# Patient Record
Sex: FEMALE | Race: WHITE | NOT HISPANIC OR LATINO | Employment: FULL TIME | URBAN - NONMETROPOLITAN AREA
[De-identification: names, ages, dates, MRNs, and addresses within clinical notes are randomized per-mention and may not be internally consistent; named-entity substitution may affect disease eponyms.]

---

## 2017-03-14 ENCOUNTER — TRANSCRIBE ORDERS (OUTPATIENT)
Dept: ADMINISTRATIVE | Facility: HOSPITAL | Age: 57
End: 2017-03-14

## 2017-03-14 DIAGNOSIS — Z12.31 VISIT FOR SCREENING MAMMOGRAM: Primary | ICD-10-CM

## 2017-04-03 ENCOUNTER — HOSPITAL ENCOUNTER (OUTPATIENT)
Dept: MAMMOGRAPHY | Facility: HOSPITAL | Age: 57
Discharge: HOME | End: 2017-04-03
Attending: FAMILY MEDICINE | Admitting: FAMILY MEDICINE

## 2017-04-03 DIAGNOSIS — Z12.31 VISIT FOR SCREENING MAMMOGRAM: ICD-10-CM

## 2017-04-03 PROCEDURE — G0202 SCR MAMMO BI INCL CAD: HCPCS

## 2017-04-03 PROCEDURE — 77063 BREAST TOMOSYNTHESIS BI: CPT

## 2017-04-03 PROCEDURE — 77067 SCR MAMMO BI INCL CAD: CPT | Performed by: RADIOLOGY

## 2017-04-03 PROCEDURE — 77063 BREAST TOMOSYNTHESIS BI: CPT | Performed by: RADIOLOGY

## 2017-04-13 ENCOUNTER — HOSPITAL ENCOUNTER (OUTPATIENT)
Dept: ULTRASOUND IMAGING | Facility: HOSPITAL | Age: 57
Discharge: HOME | End: 2017-04-13
Attending: RADIOLOGY | Admitting: RADIOLOGY

## 2017-04-13 DIAGNOSIS — R92.8 ABNORMAL MAMMOGRAM: ICD-10-CM

## 2017-04-13 PROCEDURE — 76642 ULTRASOUND BREAST LIMITED: CPT | Performed by: RADIOLOGY

## 2017-04-13 PROCEDURE — 76642 ULTRASOUND BREAST LIMITED: CPT

## 2017-11-15 ENCOUNTER — OFFICE VISIT (OUTPATIENT)
Dept: GYNECOLOGIC ONCOLOGY | Facility: CLINIC | Age: 57
End: 2017-11-15

## 2017-11-15 VITALS
RESPIRATION RATE: 12 BRPM | BODY MASS INDEX: 30.66 KG/M2 | HEIGHT: 65 IN | OXYGEN SATURATION: 95 % | DIASTOLIC BLOOD PRESSURE: 87 MMHG | SYSTOLIC BLOOD PRESSURE: 188 MMHG | HEART RATE: 60 BPM | TEMPERATURE: 97.4 F | WEIGHT: 184 LBS

## 2017-11-15 DIAGNOSIS — N81.6 RECTOCELE: ICD-10-CM

## 2017-11-15 DIAGNOSIS — Z01.419 WELL FEMALE EXAM WITH ROUTINE GYNECOLOGICAL EXAM: Primary | ICD-10-CM

## 2017-11-15 PROCEDURE — 99396 PREV VISIT EST AGE 40-64: CPT | Performed by: NURSE PRACTITIONER

## 2017-11-15 RX ORDER — ESTRADIOL 0.5 MG/1
0.5 TABLET ORAL DAILY
Qty: 30 TABLET | Refills: 11 | Status: SHIPPED | OUTPATIENT
Start: 2017-11-15 | End: 2018-01-11 | Stop reason: SDUPTHER

## 2017-11-15 RX ORDER — POLYETHYLENE GLYCOL 3350 17 G/17G
17 POWDER, FOR SOLUTION ORAL AS NEEDED
COMMUNITY
End: 2022-05-12

## 2017-11-15 NOTE — PROGRESS NOTES
GYN ONCOLOGY ANNUAL WELL WOMAN VISIT      Mercedes Dale  [id number]  1960      Chief Complaint: Gynecologic Exam (LQ bilateral when sitting)        History of present illness:  Mercedes Dale is a 56 y.o. year old female who is here today for an annual exam.  She has a history of rectocele and is status post hysterectomy with repair.  She complains today of continued symptoms of occasional lower pelvic discomfort.  The pain occurs on both sides and is worse with sitting or prolonged driving.  She is trying to keep her bowels moving with bowel regimen of MiraLAX and stool softeners.  She had CT scan done last year that showed marketed fecal retention but no other significant acute abnormalities.  She also had colonoscopy done earlier this year with no significant findings.  She wonders if she could have scar tissue in her pelvis from her previous surgery that may be causing her discomfort.  She has no difficulty passing her bowels when she does go except for ongoing chronic constipation.  She has no bladder symptoms including urinary urgency, frequency, hematuria, low back discomfort.  She does have a history of arthritic pain in her bilateral hips which she is previously received steroid injections with relief in symptoms.  She also notes she has been doing lifting with her new grandchild thinks this may worsen her symptoms of pain.  Otherwise she has been healthy since her last visit.  No other significant changes in health history her recent surgeries.  She's had no vaginal bleeding.  She denies vaginal discharge.  Her mammogram is up-to-date, done 2017.  Her colonoscopy was done earlier this year as mentioned above.  She did complete treatment for H. pylori following her GI workup.  Doing well on her current dose of estradiol request a refill on this medication today.     Cancer History:    No history exists.       Obstetric History:  OB History      Para Term  AB Living    3 2 2  1      SAB TAB Ectopic Multiple Live Births    1             Menstrual History:     No LMP recorded. Patient has had a hysterectomy.          Past Medical History:   Diagnosis Date   • Anxiety 9/1/2016   • Constipation 9/1/2016   • Decreased libido    • Hot flashes 9/1/2016   • Hypertension 9/1/2016   • Insomnia 9/1/2016   • GEORGE (stress urinary incontinence, female) 9/1/2016       Past Surgical History:   Procedure Laterality Date   • LAPAROSCOPIC ASSISTED VAGINAL HYSTERECTOMY SALPINGO OOPHORECTOMY      51       MEDICATIONS: The current medication list was reviewed and reconciled.     Allergies:  is allergic to penicillins.    Family History   Problem Relation Age of Onset   • Breast cancer Cousin        Health Maintenance:  Last mammogram was 4/3/2017. Last colonoscopy was 2017, with recommended follow-up in 5 year(s). Last pap smear was 11/114/2016, results were  normal PAP..    Last imaging study was 9/16/2016- CT abdomen and pelvis.     Review of Systems   Constitutional: Negative for fatigue, fever and unexpected weight change.   HENT: Negative for congestion, ear pain, hearing loss, sinus pressure and trouble swallowing.    Eyes: Negative for visual disturbance.   Respiratory: Negative for cough, chest tightness, shortness of breath and wheezing.    Cardiovascular: Negative for chest pain, palpitations and leg swelling.   Gastrointestinal: Positive for abdominal pain and constipation. Negative for abdominal distention, diarrhea, nausea and vomiting.        Right lower quadrant   Endocrine: Negative for cold intolerance, heat intolerance, polydipsia, polyphagia and polyuria.   Genitourinary: Negative for difficulty urinating, dyspareunia, dysuria, frequency, hematuria, pelvic pain, urgency, vaginal bleeding, vaginal discharge and vaginal pain.   Musculoskeletal: Negative for arthralgias, gait problem, joint swelling and myalgias.   Skin: Negative for color change, pallor and rash.   Neurological: Negative for  dizziness, seizures, syncope, weakness, light-headedness, numbness and headaches.   Hematological: Negative for adenopathy. Does not bruise/bleed easily.   Psychiatric/Behavioral: Negative for agitation, confusion, sleep disturbance and suicidal ideas. The patient is not nervous/anxious.        Physical Exam  General Appearance:  alert, cooperative, no apparent distress and appears stated age   Neurologic/Psychiatric: A&O x 3, gait steady, appropriate affect   HEENT:  Normocephalic, without obvious abnormality, mucous membranes moist   Neck: Supple, symmetrical, trachea midline, no adenopathy;  No thyromegaly, masses, or tenderness   Back:   Symmetric, no curvature, ROM normal, no CVA tenderness   Lungs:   Clear to auscultation bilaterally; respirations regular, even, and unlabored bilaterally   Heart:  Regular rate and rhythm, no murmurs appreciated   Breasts:  Symmetrical, no masses, no lesions and no nipple discharge   Abdomen:   Soft, non-tender, non-distended and no organomegaly   Lymph nodes: No cervical, supraclavicular, inguinal or axillary adenopathy noted   Extremities: Normal, atraumatic; no clubbing, cyanosis, or edema    Skin: No rashes, ulcers, or suspicious lesions noted   Pelvic: External Genitalia  without lesions or skin changes  Vagina  is pink, moist, without lesions.   Vaginal Cuff  Female Vaginal Cuff: smooth, intact, without visible lesions and pap obtained  Uterus  surgically absent  Ovaries  surgically absent bilaterallly and without palpable masses or fullness  Parametria  smooth  Rectovaginal  Female rectovaginal: confirms no masses or bleeding and Hemoccult negative     ECOG Performance Status: 0 - Asymptomatic    Procedure Note:  No notes on file    Assessment and Plan:    Mercedes was seen today for gynecologic exam.    Diagnoses and all orders for this visit:    Well female exam with routine gynecological exam    Rectocele    Other orders  -     estradiol (ESTRACE) 0.5 MG tablet; Take 1  tablet by mouth Daily.        The patient was instructed to call the office in 1 week for results of her pap smear.   The patient was instructed to call with vaginal bleeding, discharge, pelvic pain, change in bowel or bladder function, or any new symptoms for evaluation of her complaints.   I recommended she continue bowel regimen with Miralax and stool softeners for chronic constipation.   Mamm UTD- next due 4/3/2017  Refill on estradiol x 1 year.   Colonoscopy UTD- done 2017  I reassured the patient that her complaints of lower pelvic discomfort seemed to be consistent more with a musculoskeletal origin as she has discomfort with position changes. She has a history of bilateral hip pain and arthritis with prior steroid injections for relief. She had normal colonoscopy and CT scan done 2016 for similar complaints. We discussed that scarring and adhesive disease is a possibility, but there is little intervention available for this aside from conservative management.   Return if symptoms worsen or fail to improve.      Latonia Anna, APRN      Note: Speech recognition transcription software was used to dictate portions of this document.  An attempt at proofreading has been made though minor errors in transcription may still be present.  Please do not hesitate to call our office with any questions.

## 2017-12-05 RX ORDER — ESTRADIOL 0.5 MG/1
TABLET ORAL
Qty: 30 TABLET | Refills: 10 | OUTPATIENT
Start: 2017-12-05

## 2018-01-08 RX ORDER — ESTRADIOL 0.5 MG/1
TABLET ORAL
Qty: 30 TABLET | Refills: 10 | OUTPATIENT
Start: 2018-01-08

## 2018-01-11 RX ORDER — ESTRADIOL 0.5 MG/1
0.5 TABLET ORAL DAILY
Qty: 30 TABLET | Refills: 11 | Status: SHIPPED | OUTPATIENT
Start: 2018-01-11 | End: 2019-01-28 | Stop reason: SDUPTHER

## 2018-06-04 ENCOUNTER — HOSPITAL ENCOUNTER (OUTPATIENT)
Dept: MAMMOGRAPHY | Facility: HOSPITAL | Age: 58
Discharge: HOME | End: 2018-06-04
Admitting: FAMILY MEDICINE

## 2018-06-04 DIAGNOSIS — Z12.39 BREAST CANCER SCREENING: ICD-10-CM

## 2018-06-04 PROCEDURE — 77063 BREAST TOMOSYNTHESIS BI: CPT | Performed by: RADIOLOGY

## 2018-06-04 PROCEDURE — 77067 SCR MAMMO BI INCL CAD: CPT | Performed by: RADIOLOGY

## 2018-06-04 PROCEDURE — 77067 SCR MAMMO BI INCL CAD: CPT

## 2018-06-04 PROCEDURE — 77063 BREAST TOMOSYNTHESIS BI: CPT

## 2018-08-13 ENCOUNTER — CONSULT (OUTPATIENT)
Dept: CARDIOLOGY | Facility: CLINIC | Age: 58
End: 2018-08-13

## 2018-08-13 VITALS
HEIGHT: 66 IN | HEART RATE: 51 BPM | WEIGHT: 187.6 LBS | SYSTOLIC BLOOD PRESSURE: 148 MMHG | BODY MASS INDEX: 30.15 KG/M2 | DIASTOLIC BLOOD PRESSURE: 80 MMHG

## 2018-08-13 DIAGNOSIS — E78.2 MIXED HYPERLIPIDEMIA: ICD-10-CM

## 2018-08-13 DIAGNOSIS — R00.2 PALPITATIONS: Primary | ICD-10-CM

## 2018-08-13 DIAGNOSIS — I10 ESSENTIAL HYPERTENSION: ICD-10-CM

## 2018-08-13 PROCEDURE — 93000 ELECTROCARDIOGRAM COMPLETE: CPT | Performed by: INTERNAL MEDICINE

## 2018-08-13 PROCEDURE — 99203 OFFICE O/P NEW LOW 30 MIN: CPT | Performed by: INTERNAL MEDICINE

## 2018-08-13 RX ORDER — ASPIRIN 81 MG/1
81 TABLET ORAL AS NEEDED
COMMUNITY
End: 2018-09-13

## 2018-08-13 NOTE — PROGRESS NOTES
[facility] at [facility]  Consultation H&P  Mercedes Dale  1960  [phone]    VISIT DATE:  08/13/18    PCP: Keegan De Jesus MD  [address] [place] [address]    IDENTIFICATION: A 57 y.o. 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    CC:  Chief Complaint   Patient presents with   • Palpitations   • Shortness of Breath   • Chest Pain   • Irregular Heart Beat       PROBLEM LIST:  Palpitations  HL  Gerd        Allergies  Allergies   Allergen Reactions   • Penicillins        Current Medications    Current Outpatient Prescriptions:   •  ALPRAZolam (XANAX) 1 MG tablet, 1 mg As Needed., Disp: , Rfl:   •  aspirin 81 MG EC tablet, Take 81 mg by mouth As Needed., Disp: , Rfl:   •  atorvastatin (LIPITOR) 10 MG tablet, 10 mg Daily., Disp: , Rfl:   •  CARTIA  MG 24 hr capsule, 240 mg Daily., Disp: , Rfl:   •  docusate sodium (COLACE) 100 MG capsule, Take 1 capsule by mouth 2 (two) times a day. Two capusles, Disp: 60 capsule, Rfl: 3  •  estradiol (ESTRACE) 0.5 MG tablet, Take 1 tablet by mouth Daily., Disp: 30 tablet, Rfl: 11  •  Multiple Vitamins-Minerals (THRIVE FOR LIFE WOMENS) tablet, Take  by mouth Daily., Disp: , Rfl:   •  pantoprazole (PROTONIX) 40 MG EC tablet, 40 mg Daily., Disp: , Rfl:   •  polyethylene glycol (MIRALAX) packet, Take 17 g by mouth As Needed., Disp: , Rfl:   •  zolpidem (AMBIEN) 10 MG tablet, 10 mg At Night As Needed., Disp: , Rfl:      History of Present Illness   HPI    Pt in referral w greater than two year h/o palpitations and presyncope. Pt states historically after last child born did have holter monitor.  Furthermore, did have a stress test in remote past for atypical chest pain.  Pt describes chest fluttering about every 3 days with on occasion a sustained flutter for up to one hour.  Associated with this is presyncope and general feeling of ill health.  No overt chest pain with such.  No observed provocative procedures or any association with changes  of medications that she can state.  She remains active and does yard work she wears a fit bit and notes that at times typically heart rate would be in the 50s but with times of palpitations will be 80s and sometimes greater than 100.  She does travel extensively and cannot state that these issues have correlated to any travel experience.  Patient does admit to sleep disorder and takes Ambien nightly ,has concern regarding sleep due to having 2 cousins have sudden cardiac death at night    ROS  Review of Systems   Constitution: Negative for chills, fever, weakness, malaise/fatigue, night sweats, weight gain and weight loss.   HENT: Negative for hearing loss and nosebleeds.    Eyes: Negative for blurred vision, vision loss in left eye, vision loss in right eye, visual disturbance and visual halos.   Cardiovascular: Positive for palpitations. Negative for chest pain, claudication, cyanosis, dyspnea on exertion, irregular heartbeat, leg swelling, near-syncope, orthopnea, paroxysmal nocturnal dyspnea and syncope.   Respiratory: Negative for cough, hemoptysis, shortness of breath, snoring and wheezing.    Endocrine: Negative for cold intolerance, heat intolerance, polydipsia, polyphagia and polyuria.   Hematologic/Lymphatic: Negative for adenopathy and bleeding problem. Does not bruise/bleed easily.   Skin: Negative for dry skin, poor wound healing and rash.   Musculoskeletal: Negative for falls, joint pain, joint swelling, muscle cramps, muscle weakness, myalgias and neck pain.   Gastrointestinal: Negative for bloating, abdominal pain, change in bowel habit, bowel incontinence, constipation, diarrhea, dysphagia, excessive appetite, heartburn, hematemesis, hematochezia, jaundice, melena, nausea and vomiting.   Genitourinary: Negative for bladder incontinence, dysuria, flank pain, hematuria, hesitancy and nocturia.   Neurological: Negative for aphonia, excessive daytime sleepiness, dizziness, focal weakness, headaches,  "light-headedness, loss of balance, seizures, sensory change, tremors and vertigo.   Psychiatric/Behavioral: Negative for altered mental status, depression, memory loss, substance abuse and suicidal ideas. The patient is not nervous/anxious.        SOCIAL HX  Social History     Social History   • Marital status:      Spouse name: N/A   • Number of children: 2   • Years of education: N/A     Occupational History   • Not on file.     Social History Main Topics   • Smoking status: Never Smoker   • Smokeless tobacco: Never Used   • Alcohol use No   • Drug use: No   • Sexual activity: Yes     Partners: Male     Birth control/ protection: Surgical     Other Topics Concern   • Not on file     Social History Narrative   • No narrative on file       FAMILY HX  Family History   Problem Relation Age of Onset   • Breast cancer Cousin    • Stroke Mother    • Dementia Mother    • Mitral valve prolapse Mother    • Heart attack Father        Vitals:    08/13/18 1042   BP: 148/80   BP Location: Right arm   Patient Position: Sitting   Pulse: 51   Weight: 85.1 kg (187 lb 9.6 oz)   Height: 167.6 cm (66\")       PHYSICAL EXAMINATION:  Physical Exam   Constitutional: She is oriented to person, place, and time. She appears well-developed and well-nourished. No distress.   HENT:   Head: Normocephalic and atraumatic.   Nose: Nose normal.   Mouth/Throat: Uvula is midline, oropharynx is clear and moist and mucous membranes are normal.   Eyes: Pupils are equal, round, and reactive to light. Conjunctivae and EOM are normal. No scleral icterus.   Neck: Normal range of motion. Neck supple. No hepatojugular reflux and no JVD present. Carotid bruit is not present. No tracheal deviation present. No thyromegaly present.   Cardiovascular: Normal rate, regular rhythm, S1 normal, S2 normal, intact distal pulses and normal pulses.  PMI is not displaced.  Exam reveals no gallop, no distant heart sounds, no friction rub, no midsystolic click and no " opening snap.    No murmur heard.  Pulses:       Radial pulses are 2+ on the right side, and 2+ on the left side.        Dorsalis pedis pulses are 2+ on the right side, and 2+ on the left side.        Posterior tibial pulses are 2+ on the right side, and 2+ on the left side.   Pulmonary/Chest: Effort normal and breath sounds normal. She has no wheezes. She has no rhonchi. She has no rales.   Abdominal: Soft. Bowel sounds are normal. She exhibits no mass. There is no tenderness. There is no guarding.   Musculoskeletal: She exhibits no edema or tenderness.   Lymphadenopathy:     She has no cervical adenopathy.   Neurological: She is alert and oriented to person, place, and time.   Skin: Skin is warm, dry and intact. No rash noted. No cyanosis or erythema. Nails show no clubbing.   Psychiatric: She has a normal mood and affect. Her behavior is normal.   Nursing note and vitals reviewed.      Diagnostic Data:    ECG 12 Lead  Date/Time: 8/13/2018 12:32 PM  Performed by: RADHA RAMIREZ  Authorized by: RADHA RAMIREZ   Comparison: not compared with previous ECG   Rhythm: sinus bradycardia  Clinical impression: non-specific ECG          No results found for: CHLPL, TRIG, HDL, LDLDIRECT  Lab Results   Component Value Date    CREATININE 1.00 09/16/2016     No results found for: HGBA1C  No results found for: WBC, HGB, HCT, PLT    ASSESSMENT:   Diagnosis Plan   1. Palpitations  Adult Transthoracic Echo Complete W/ Cont if Necessary Per Protocol    Cardiac Event Monitor    Lipid Panel    TSH    Comprehensive Metabolic Panel   2. Essential hypertension     3. Mixed hyperlipidemia           PLAN:  Holter/event monitoring to evaluate for atrial ventricular ectopy burden    EchoCardiogram to assess for structural heart disease    TSH lipid profile and CMP    Observation of QT interval with subsequent EKG currently QTc 460 ms        Radha Ramirez MD, [facility]

## 2018-08-22 ENCOUNTER — APPOINTMENT (OUTPATIENT)
Dept: CARDIOLOGY | Facility: HOSPITAL | Age: 58
End: 2018-08-22
Attending: INTERNAL MEDICINE

## 2018-08-23 ENCOUNTER — TELEPHONE (OUTPATIENT)
Dept: CARDIOLOGY | Facility: CLINIC | Age: 58
End: 2018-08-23

## 2018-08-23 DIAGNOSIS — Z79.899 MEDICATION COURSE CHANGED: Primary | ICD-10-CM

## 2018-08-23 RX ORDER — FLECAINIDE ACETATE 50 MG/1
50 TABLET ORAL 2 TIMES DAILY
Qty: 60 TABLET | Refills: 11 | Status: SHIPPED | OUTPATIENT
Start: 2018-08-23 | End: 2019-06-17 | Stop reason: SDUPTHER

## 2018-08-23 NOTE — TELEPHONE ENCOUNTER
Called patient and let her know that Dr Ramirez reviewed the alert on her monitor and it should afib. He wants her to start eliquis 5mg BID, flecainide 50mg BID and she will need to get EKG 48hr later. Will send scripts to pharmacy.

## 2018-08-24 NOTE — TELEPHONE ENCOUNTER
Patient called back to confirm she is to continue current medications and she can stop wearing monitor. Informed her that she can mail monitor back and to continue current medications.

## 2018-08-27 ENCOUNTER — HOSPITAL ENCOUNTER (OUTPATIENT)
Dept: RESPIRATORY THERAPY | Facility: HOSPITAL | Age: 58
Discharge: HOME | End: 2018-08-27
Admitting: INTERNAL MEDICINE

## 2018-08-27 PROCEDURE — 93005 ELECTROCARDIOGRAM TRACING: CPT | Performed by: INTERNAL MEDICINE

## 2018-08-27 PROCEDURE — 93010 ELECTROCARDIOGRAM REPORT: CPT | Performed by: INTERNAL MEDICINE

## 2018-09-05 ENCOUNTER — HOSPITAL ENCOUNTER (OUTPATIENT)
Dept: CARDIOLOGY | Facility: HOSPITAL | Age: 58
Discharge: HOME | End: 2018-09-05
Attending: INTERNAL MEDICINE | Admitting: INTERNAL MEDICINE

## 2018-09-05 DIAGNOSIS — R00.2 PALPITATIONS: ICD-10-CM

## 2018-09-05 LAB
BH CV ECHO MEAS - AI DEC SLOPE: 76.5 CM/SEC^2
BH CV ECHO MEAS - AI MAX PG: 32.7 MMHG
BH CV ECHO MEAS - AI MAX VEL: 286 CM/SEC
BH CV ECHO MEAS - AI P1/2T: 1095 MSEC
BH CV ECHO MEAS - AO ROOT AREA (BSA CORRECTED): 2
BH CV ECHO MEAS - AO ROOT AREA: 11.3 CM^2
BH CV ECHO MEAS - AO ROOT DIAM: 3.8 CM
BH CV ECHO MEAS - BSA(HAYCOCK): 2 M^2
BH CV ECHO MEAS - BSA: 1.9 M^2
BH CV ECHO MEAS - BZI_BMI: 30.2 KILOGRAMS/M^2
BH CV ECHO MEAS - BZI_METRIC_HEIGHT: 167.6 CM
BH CV ECHO MEAS - BZI_METRIC_WEIGHT: 84.8 KG
BH CV ECHO MEAS - EDV(CUBED): 85.8 ML
BH CV ECHO MEAS - EDV(MOD-SP2): 72 ML
BH CV ECHO MEAS - EDV(MOD-SP4): 90 ML
BH CV ECHO MEAS - EDV(TEICH): 88.2 ML
BH CV ECHO MEAS - EF(CUBED): 69.5 %
BH CV ECHO MEAS - EF(MOD-BP): 60 %
BH CV ECHO MEAS - EF(MOD-SP2): 63.9 %
BH CV ECHO MEAS - EF(MOD-SP4): 62.2 %
BH CV ECHO MEAS - EF(TEICH): 61.3 %
BH CV ECHO MEAS - ESV(CUBED): 26.2 ML
BH CV ECHO MEAS - ESV(MOD-SP2): 26 ML
BH CV ECHO MEAS - ESV(MOD-SP4): 34 ML
BH CV ECHO MEAS - ESV(TEICH): 34.2 ML
BH CV ECHO MEAS - FS: 32.7 %
BH CV ECHO MEAS - IVS/LVPW: 1
BH CV ECHO MEAS - IVSD: 0.99 CM
BH CV ECHO MEAS - LA DIMENSION: 4.2 CM
BH CV ECHO MEAS - LA/AO: 1.1
BH CV ECHO MEAS - LAD MAJOR: 5.3 CM
BH CV ECHO MEAS - LV DIASTOLIC VOL/BSA (35-75): 46.3 ML/M^2
BH CV ECHO MEAS - LV MASS(C)D: 143 GRAMS
BH CV ECHO MEAS - LV MASS(C)DI: 73.6 GRAMS/M^2
BH CV ECHO MEAS - LV MAX PG: 4.8 MMHG
BH CV ECHO MEAS - LV MEAN PG: 2 MMHG
BH CV ECHO MEAS - LV SYSTOLIC VOL/BSA (12-30): 17.5 ML/M^2
BH CV ECHO MEAS - LV V1 MAX: 110 CM/SEC
BH CV ECHO MEAS - LV V1 MEAN: 61.2 CM/SEC
BH CV ECHO MEAS - LV V1 VTI: 26.2 CM
BH CV ECHO MEAS - LVIDD: 4.4 CM
BH CV ECHO MEAS - LVIDS: 3 CM
BH CV ECHO MEAS - LVLD AP2: 6.7 CM
BH CV ECHO MEAS - LVLD AP4: 6.6 CM
BH CV ECHO MEAS - LVLS AP2: 4.9 CM
BH CV ECHO MEAS - LVLS AP4: 4.7 CM
BH CV ECHO MEAS - LVOT AREA (M): 2.8 CM^2
BH CV ECHO MEAS - LVOT AREA: 2.8 CM^2
BH CV ECHO MEAS - LVOT DIAM: 1.9 CM
BH CV ECHO MEAS - LVPWD: 0.96 CM
BH CV ECHO MEAS - PA ACC SLOPE: 412 CM/SEC^2
BH CV ECHO MEAS - PA ACC TIME: 0.15 SEC
BH CV ECHO MEAS - PA PR(ACCEL): 10.2 MMHG
BH CV ECHO MEAS - RVDD: 3.3 CM
BH CV ECHO MEAS - SI(CUBED): 30.6 ML/M^2
BH CV ECHO MEAS - SI(LVOT): 38.2 ML/M^2
BH CV ECHO MEAS - SI(MOD-SP2): 23.7 ML/M^2
BH CV ECHO MEAS - SI(MOD-SP4): 28.8 ML/M^2
BH CV ECHO MEAS - SI(TEICH): 27.8 ML/M^2
BH CV ECHO MEAS - SV(CUBED): 59.6 ML
BH CV ECHO MEAS - SV(LVOT): 74.3 ML
BH CV ECHO MEAS - SV(MOD-SP2): 46 ML
BH CV ECHO MEAS - SV(MOD-SP4): 56 ML
BH CV ECHO MEAS - SV(TEICH): 54 ML
BH CV ECHO MEAS - TAPSE (>1.6): 2.5 CM2
BH CV XLRA - RV BASE: 3.7 CM
BH CV XLRA - RV LENGTH: 5.6 CM
BH CV XLRA - RV MID: 3.6 CM
LEFT ATRIUM VOLUME INDEX: 35 ML/M^2
LV EF 2D ECHO EST: 60 %

## 2018-09-05 PROCEDURE — 93306 TTE W/DOPPLER COMPLETE: CPT

## 2018-09-05 PROCEDURE — 93306 TTE W/DOPPLER COMPLETE: CPT | Performed by: INTERNAL MEDICINE

## 2018-09-10 ENCOUNTER — LAB (OUTPATIENT)
Dept: LAB | Facility: HOSPITAL | Age: 58
End: 2018-09-10

## 2018-09-10 DIAGNOSIS — R00.2 PALPITATIONS: ICD-10-CM

## 2018-09-10 LAB
ALBUMIN SERPL-MCNC: 4.3 G/DL (ref 3.5–5)
ALBUMIN/GLOB SERPL: 1.2 G/DL (ref 1.5–2.5)
ALP SERPL-CCNC: 58 U/L (ref 35–104)
ALT SERPL W P-5'-P-CCNC: 49 U/L (ref 10–36)
ANION GAP SERPL CALCULATED.3IONS-SCNC: 6.1 MMOL/L (ref 3.6–11.2)
AST SERPL-CCNC: 34 U/L (ref 10–30)
BILIRUB SERPL-MCNC: 0.5 MG/DL (ref 0.2–1.8)
BUN BLD-MCNC: 18 MG/DL (ref 7–21)
BUN/CREAT SERPL: 15.3 (ref 7–25)
CALCIUM SPEC-SCNC: 9 MG/DL (ref 7.7–10)
CHLORIDE SERPL-SCNC: 106 MMOL/L (ref 99–112)
CHOLEST SERPL-MCNC: 172 MG/DL (ref 0–200)
CO2 SERPL-SCNC: 25.9 MMOL/L (ref 24.3–31.9)
CREAT BLD-MCNC: 1.18 MG/DL (ref 0.43–1.29)
GFR SERPL CREATININE-BSD FRML MDRD: 47 ML/MIN/1.73
GLOBULIN UR ELPH-MCNC: 3.6 GM/DL
GLUCOSE BLD-MCNC: 102 MG/DL (ref 70–110)
HDLC SERPL-MCNC: 40 MG/DL (ref 60–100)
LDLC SERPL CALC-MCNC: 88 MG/DL (ref 0–100)
LDLC/HDLC SERPL: 2.2 {RATIO}
OSMOLALITY SERPL CALC.SUM OF ELEC: 277.8 MOSM/KG (ref 273–305)
POTASSIUM BLD-SCNC: 4 MMOL/L (ref 3.5–5.3)
PROT SERPL-MCNC: 7.9 G/DL (ref 6–8)
SODIUM BLD-SCNC: 138 MMOL/L (ref 135–153)
TRIGL SERPL-MCNC: 220 MG/DL (ref 0–150)
TSH SERPL DL<=0.05 MIU/L-ACNC: 4.09 MIU/ML (ref 0.55–4.78)
VLDLC SERPL-MCNC: 44 MG/DL

## 2018-09-10 PROCEDURE — 84443 ASSAY THYROID STIM HORMONE: CPT

## 2018-09-10 PROCEDURE — 80053 COMPREHEN METABOLIC PANEL: CPT

## 2018-09-10 PROCEDURE — 80061 LIPID PANEL: CPT | Performed by: INTERNAL MEDICINE

## 2018-09-10 PROCEDURE — 36415 COLL VENOUS BLD VENIPUNCTURE: CPT

## 2018-09-13 ENCOUNTER — OFFICE VISIT (OUTPATIENT)
Dept: CARDIOLOGY | Facility: CLINIC | Age: 58
End: 2018-09-13

## 2018-09-13 VITALS
HEART RATE: 67 BPM | WEIGHT: 186 LBS | HEIGHT: 66 IN | SYSTOLIC BLOOD PRESSURE: 120 MMHG | DIASTOLIC BLOOD PRESSURE: 80 MMHG | BODY MASS INDEX: 29.89 KG/M2

## 2018-09-13 DIAGNOSIS — R00.2 PALPITATIONS: Primary | ICD-10-CM

## 2018-09-13 DIAGNOSIS — I48.0 PAROXYSMAL ATRIAL FIBRILLATION (HCC): ICD-10-CM

## 2018-09-13 PROCEDURE — 99213 OFFICE O/P EST LOW 20 MIN: CPT | Performed by: INTERNAL MEDICINE

## 2018-09-13 NOTE — PROGRESS NOTES
[facility] at 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    CC:  Chief Complaint   Patient presents with   • Palpitations   • Shortness of Breath       PROBLEM LIST:  1. Palpitations  1. 9/5/18 echo: EF 60% mild AI  2. 9/18 Event monitor - short lived afib  2. HL  1. 9/10/18   HDL 40 LDL 88, on atorvastatin  3. Gerd    Allergies  Allergies   Allergen Reactions   • Penicillins        Current Medications    Current Outpatient Prescriptions:   •  ALPRAZolam (XANAX) 1 MG tablet, 1 mg As Needed., Disp: , Rfl:   •  apixaban (ELIQUIS) 5 MG tablet tablet, Take 1 tablet by mouth Every 12 (Twelve) Hours., Disp: 60 tablet, Rfl: 11  •  atorvastatin (LIPITOR) 10 MG tablet, 10 mg Daily., Disp: , Rfl:   •  CARTIA  MG 24 hr capsule, 240 mg Daily., Disp: , Rfl:   •  docusate sodium (COLACE) 100 MG capsule, Take 1 capsule by mouth 2 (two) times a day. Two capusles, Disp: 60 capsule, Rfl: 3  •  estradiol (ESTRACE) 0.5 MG tablet, Take 1 tablet by mouth Daily., Disp: 30 tablet, Rfl: 11  •  flecainide (TAMBOCOR) 50 MG tablet, Take 1 tablet by mouth 2 (Two) Times a Day., Disp: 60 tablet, Rfl: 11  •  Multiple Vitamins-Minerals (THRIVE FOR LIFE WOMENS) tablet, Take  by mouth Daily., Disp: , Rfl:   •  pantoprazole (PROTONIX) 40 MG EC tablet, 40 mg Daily., Disp: , Rfl:   •  polyethylene glycol (MIRALAX) packet, Take 17 g by mouth As Needed., Disp: , Rfl:   •  zolpidem (AMBIEN) 10 MG tablet, 10 mg At Night As Needed., Disp: , Rfl:      History of Present Illness   HPI    Pt in fu.  Holter nonsustained afib and echo acceptable.  Pt tolerant of new Rxs.  Has occasional sharp palpitation at night.  Not exercising.    ROS  Review of Systems       SOCIAL HX  Social History     Social History   • Marital status:      " Spouse name: N/A   • Number of children: 2   • Years of education: N/A     Occupational History   • Not on file.     Social History Main Topics   • Smoking status: Never Smoker   • Smokeless tobacco: Never Used   • Alcohol use No   • Drug use: No   • Sexual activity: Yes     Partners: Male     Birth control/ protection: Surgical     Other Topics Concern   • Not on file     Social History Narrative   • No narrative on file       FAMILY HX  Family History   Problem Relation Age of Onset   • Breast cancer Cousin    • Stroke Mother    • Dementia Mother    • Mitral valve prolapse Mother    • Heart attack Father        Vitals:    09/13/18 1516   BP: 120/80   BP Location: Right arm   Patient Position: Sitting   Pulse: 67   Weight: 84.4 kg (186 lb)   Height: 167.6 cm (66\")       PHYSICAL EXAMINATION:  Physical Exam   Constitutional: She is oriented to person, place, and time. She appears well-developed and well-nourished. No distress.   HENT:   Head: Normocephalic and atraumatic.   Nose: Nose normal.   Mouth/Throat: Uvula is midline, oropharynx is clear and moist and mucous membranes are normal.   Eyes: Pupils are equal, round, and reactive to light. Conjunctivae and EOM are normal. No scleral icterus.   Neck: Normal range of motion. Neck supple. No hepatojugular reflux and no JVD present. Carotid bruit is not present. No tracheal deviation present. No thyromegaly present.   Cardiovascular: Normal rate, regular rhythm, S1 normal, S2 normal, intact distal pulses and normal pulses.  PMI is not displaced.  Exam reveals no gallop, no distant heart sounds, no friction rub, no midsystolic click and no opening snap.    No murmur heard.  Pulses:       Radial pulses are 2+ on the right side, and 2+ on the left side.        Dorsalis pedis pulses are 2+ on the right side, and 2+ on the left side.        Posterior tibial pulses are 2+ on the right side, and 2+ on the left side.   Pulmonary/Chest: Effort normal and breath sounds " normal. She has no wheezes. She has no rhonchi. She has no rales.   Abdominal: Soft. Bowel sounds are normal. She exhibits no mass. There is no tenderness. There is no guarding.   Musculoskeletal: She exhibits no edema or tenderness.   Lymphadenopathy:     She has no cervical adenopathy.   Neurological: She is alert and oriented to person, place, and time.   Skin: Skin is warm, dry and intact. No rash noted. No cyanosis or erythema. Nails show no clubbing.   Psychiatric: She has a normal mood and affect. Her behavior is normal.   Nursing note and vitals reviewed.      Diagnostic Data:  Procedures  Lab Results   Component Value Date    TRIG 220 (H) 09/10/2018    HDL 40 (L) 09/10/2018     Lab Results   Component Value Date    GLUCOSE 102 09/10/2018    BUN 18 09/10/2018    CREATININE 1.18 09/10/2018     09/10/2018    K 4.0 09/10/2018     09/10/2018    CO2 25.9 09/10/2018     No results found for: HGBA1C  No results found for: WBC, HGB, HCT, PLT    ASSESSMENT:   Diagnosis Plan   1. Palpitations     2. Paroxysmal atrial fibrillation (CMS/[facility])           PLAN:  Continue flecainide and NOAC.  Pt to initiate exercise and weight loss endeavor.                Jon Ramirez MD, [facility]

## 2018-12-04 ENCOUNTER — TELEPHONE (OUTPATIENT)
Dept: GYNECOLOGIC ONCOLOGY | Facility: CLINIC | Age: 58
End: 2018-12-04

## 2018-12-17 NOTE — PROGRESS NOTES
GYN ONCOLOGY ANNUAL WELL WOMAN VISIT      Mercedes Dale  [id number]  1960      Chief Complaint: Annual Exam (no libido, low pelvic discomfort w/sitting at times, ruq discomfort- pcp has given her an inj in the past for inflam that has helped.)        History of present illness:  Mercedes Dale is a 57 y.o. year old female who is here today for an annual exam. She has a history of rectocele and is status post hysterectomy with repair. Upon arrival she c/o ongoing symptoms of occasional lower pelvic discomfort, worse when sitting. This was present at her exam last year as well and is overall unchanged over the last several years. She c/o RUQ discomfort that is newer and is being evaluated by her PCP. She states Dr. De Jesus told her this is likely due to GI inflammation. She states she had an injection for this in the past that helped. She also c/o decreased libido. She is currently using Estrace 0.5 mg PO daily for HRT. She denies hot flashes and sweating, reports some vaginal dryness. She denies vaginal bleeding, pelvic pain, concerning lesions, breast changes, or changes in bowel or bladder function. Mammogram and colonoscopy are both currently UTD.         Obstetric History:  OB History      Para Term  AB Living    3 2 2   1      SAB TAB Ectopic Molar Multiple Live Births    1                   Menstrual History:     No LMP recorded. Patient has had a hysterectomy.          Past Medical History:   Diagnosis Date   • Abnormal heart rhythm    • Anxiety 2016   • Anxiety    • Constipation 2016   • Decreased libido    • History of echocardiogram    • History of Holter monitoring    • History of stress test    • Hot flashes 2016   • Hyperlipidemia    • Hypertension 2016   • Insomnia 2016   • Mitral valve prolapse    • GEORGE (stress urinary incontinence, female) 2016       Past Surgical History:   Procedure Laterality Date   • GALLBLADDER SURGERY     • KNEE ARTHROSCOPY       "scope twice   • LAPAROSCOPIC ASSISTED VAGINAL HYSTERECTOMY SALPINGO OOPHORECTOMY      51       MEDICATIONS: The current medication list was reviewed and reconciled.     Allergies:  is allergic to penicillins.    Family History   Problem Relation Age of Onset   • Breast cancer Cousin    • Stroke Mother    • Dementia Mother    • Mitral valve prolapse Mother    • Heart attack Father        Health Maintenance:  Last mammogram was 6/2018. Last colonoscopy was 2016, with recommended follow-up in 5-10 year(s). Last pap smear was 2017, results were  normal PAP..      Review of Systems   Constitutional: Negative for appetite change, chills, fatigue, fever and unexpected weight change.   Respiratory: Negative for cough, shortness of breath and wheezing.    Cardiovascular: Negative for chest pain, palpitations and leg swelling.   Gastrointestinal: Negative for abdominal distention, abdominal pain, blood in stool, constipation, diarrhea, nausea and vomiting.   Endocrine: Negative.    Genitourinary: Negative for dyspareunia, dysuria, frequency, genital sores, hematuria, pelvic pain, urgency, vaginal bleeding, vaginal discharge and vaginal pain.   Musculoskeletal: Negative for arthralgias, gait problem and joint swelling.   Neurological: Negative for dizziness, seizures, syncope, weakness, light-headedness, numbness and headaches.   Hematological: Negative for adenopathy.   Psychiatric/Behavioral: Negative.        Physical Exam  Vital Signs: /81   Pulse 70   Temp 98.3 °F (36.8 °C) (Temporal)   Resp 20   Ht 165.1 cm (65\")   Wt 85.7 kg (189 lb)   SpO2 97%   BMI 31.45 kg/m²    General Appearance:  alert, cooperative, no apparent distress and appears stated age   Neurologic/Psychiatric: A&O x 3, gait steady, appropriate affect   HEENT:  Normocephalic, without obvious abnormality, mucous membranes moist   Neck: Supple, symmetrical, trachea midline, no adenopathy;  No thyromegaly, masses, or tenderness   Back:   Symmetric, " no curvature, ROM normal, no CVA tenderness   Lungs:   Clear to auscultation bilaterally; respirations regular, even, and unlabored bilaterally   Heart:  Regular rate and rhythm, no murmurs appreciated   Breasts:  Symmetrical, no masses, no lesions and no nipple discharge   Abdomen:   Soft, non-tender, non-distended and no organomegaly   Lymph nodes: No cervical, supraclavicular, inguinal or axillary adenopathy noted   Extremities: Normal, atraumatic; no clubbing, cyanosis, or edema    Skin: No rashes, ulcers, or suspicious lesions noted   Pelvic: External Genitalia  without lesions or skin changes  Vagina  pink, without lesions, mild atrophy  Vaginal Cuff  Female Vaginal Cuff: smooth, intact, without visible lesions and pap obtained  Uterus  surgically absent  Ovaries  surgically absent bilaterallly  Parametria  smooth  Rectovaginal  Female rectovaginal: confirms no masses or bleeding and Hemoccult negative       Procedure Note:  No notes on file    Assessment and Plan:    Mercedes was seen today for annual exam.    Diagnoses and all orders for this visit:    Well woman exam with routine gynecological exam  -     Liquid-based Pap Smear, Screening; Future    Constipation, unspecified constipation type    Decreased libido    Hormone replacement therapy (HRT)        Pap was done today. If she does not receive the results of the Pap within 2 weeks  time, she was instructed to call to find out the results.      She was encouraged to get yearly mammograms.  She should report any palpable breast lump(s) or skin changes regardless of mammographic findings.  I explained to Mercedes that notification regarding her mammogram results will come from the center performing the study.  Our office will not be routinely calling with mammogram results.  It is her responsibility to make sure that the results from the mammogram are communicated to her by the breast center.  If she has any questions about the results, she is welcome to call  our office anytime.    Repeat colonoscopy in [id number] or sooner if new problems.     She was recommended to begin bone density scans (DEXA) at age 60-65 for osteoporosis screening.    I recommended increasing colace to BID to help manage constipation.   HRT refills given x 1 year.   We discussed her decreased libido and treatment options. She states she has used a testosterone cream in the past, but this caused elevated heart rate and palpitations. Since that time she was found to have A-Fib. She states she is now on a new exercise program and new medication under the care of Dr. Leigh. She would like to make sure that her cardiac symptoms are well controlled and then may consider trying testosterone again in the future if doing well. No new medications at this time.   Keep follow-ups with PCP for management of RUQ discomfort. I agree that lower abdominal discomfort seems most consistent with pelvic adhesions. Encouraged massage, exercise, etc. If symptoms persist, we could consider pelvic floor PT. She v/u.       Return to clinic in 1 year for Annual exam.      DARIO Tolentino      Note: Speech recognition transcription software was used to dictate portions of this document.  An attempt at proofreading has been made though minor errors in transcription may still be present.  Please do not hesitate to call our office with any questions.

## 2018-12-18 ENCOUNTER — OFFICE VISIT (OUTPATIENT)
Dept: GYNECOLOGIC ONCOLOGY | Facility: CLINIC | Age: 58
End: 2018-12-18

## 2018-12-18 VITALS
TEMPERATURE: 98.3 F | WEIGHT: 189 LBS | BODY MASS INDEX: 31.49 KG/M2 | HEART RATE: 70 BPM | SYSTOLIC BLOOD PRESSURE: 144 MMHG | OXYGEN SATURATION: 97 % | HEIGHT: 65 IN | RESPIRATION RATE: 20 BRPM | DIASTOLIC BLOOD PRESSURE: 81 MMHG

## 2018-12-18 DIAGNOSIS — Z01.419 WELL WOMAN EXAM WITH ROUTINE GYNECOLOGICAL EXAM: Primary | ICD-10-CM

## 2018-12-18 DIAGNOSIS — K59.00 CONSTIPATION, UNSPECIFIED CONSTIPATION TYPE: ICD-10-CM

## 2018-12-18 DIAGNOSIS — Z79.890 HORMONE REPLACEMENT THERAPY (HRT): ICD-10-CM

## 2018-12-18 DIAGNOSIS — R68.82 DECREASED LIBIDO: ICD-10-CM

## 2018-12-18 PROCEDURE — 99396 PREV VISIT EST AGE 40-64: CPT | Performed by: NURSE PRACTITIONER

## 2019-01-28 RX ORDER — ESTRADIOL 0.5 MG/1
TABLET ORAL
Qty: 30 TABLET | Refills: 10 | Status: SHIPPED | OUTPATIENT
Start: 2019-01-28 | End: 2019-12-31

## 2019-06-17 ENCOUNTER — OFFICE VISIT (OUTPATIENT)
Dept: CARDIOLOGY | Facility: CLINIC | Age: 59
End: 2019-06-17

## 2019-06-17 VITALS
SYSTOLIC BLOOD PRESSURE: 124 MMHG | BODY MASS INDEX: 30.82 KG/M2 | WEIGHT: 185 LBS | HEIGHT: 65 IN | DIASTOLIC BLOOD PRESSURE: 82 MMHG | HEART RATE: 61 BPM

## 2019-06-17 DIAGNOSIS — R00.2 PALPITATIONS: ICD-10-CM

## 2019-06-17 DIAGNOSIS — I48.0 PAROXYSMAL ATRIAL FIBRILLATION (HCC): Primary | ICD-10-CM

## 2019-06-17 PROCEDURE — 99213 OFFICE O/P EST LOW 20 MIN: CPT | Performed by: INTERNAL MEDICINE

## 2019-06-17 PROCEDURE — 93000 ELECTROCARDIOGRAM COMPLETE: CPT | Performed by: INTERNAL MEDICINE

## 2019-06-17 RX ORDER — FLECAINIDE ACETATE 50 MG/1
50 TABLET ORAL 2 TIMES DAILY
Qty: 180 TABLET | Refills: 2 | Status: SHIPPED | OUTPATIENT
Start: 2019-06-17 | End: 2020-03-19 | Stop reason: SDUPTHER

## 2019-06-17 NOTE — PROGRESS NOTES
[facility]     Mercedes Dale  1960  [phone]    VISIT DATE:  06/17/19    PCP: Keegan De Jesus MD  [address] [place] [address]    IDENTIFICATION: A 58 y.o. 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    CC:  Chief Complaint   Patient presents with   • Palpitations       PROBLEM LIST:  1. Palpitations  1. 9/5/18 echo: EF 60% mild AI  2. 9/18 Event monitor - short lived afib  2. HL  1. 9/10/18   HDL 40 LDL 88, on atorvastatin  3. Gerd    Allergies  Allergies   Allergen Reactions   • Codeine Nausea And Vomiting       Current Medications    Current Outpatient Medications:   •  ALPRAZolam (XANAX) 1 MG tablet, 1 mg As Needed., Disp: , Rfl:   •  apixaban (ELIQUIS) 5 MG tablet tablet, Take 1 tablet by mouth Every 12 (Twelve) Hours., Disp: 60 tablet, Rfl: 11  •  atorvastatin (LIPITOR) 10 MG tablet, 10 mg Daily., Disp: , Rfl:   •  CARTIA  MG 24 hr capsule, 240 mg Daily., Disp: , Rfl:   •  docusate sodium (COLACE) 100 MG capsule, Take 1 capsule by mouth 2 (two) times a day. Two capusles, Disp: 60 capsule, Rfl: 3  •  estradiol (ESTRACE) 0.5 MG tablet, TAKE ONE TABLET BY MOUTH DAILY, Disp: 30 tablet, Rfl: 10  •  flecainide (TAMBOCOR) 50 MG tablet, Take 1 tablet by mouth 2 (Two) Times a Day., Disp: 60 tablet, Rfl: 11  •  Multiple Vitamins-Minerals (THRIVE FOR LIFE WOMENS) tablet, Take  by mouth Daily., Disp: , Rfl:   •  pantoprazole (PROTONIX) 40 MG EC tablet, 40 mg Daily., Disp: , Rfl:   •  polyethylene glycol (MIRALAX) packet, Take 17 g by mouth As Needed., Disp: , Rfl:   •  zolpidem (AMBIEN) 10 MG tablet, 10 mg At Night As Needed., Disp: , Rfl:      History of Present Illness   HPI    Pt in fu. Now exercising without provoked issue.  Intermittent palpitations much improved and short lived.  Some increased cramping in LE.    SOCIAL HX  Social History     Socioeconomic History   • Marital status:      Spouse name: Not on file   • Number of children:  "2   • Years of education: Not on file   • Highest education level: Not on file   Tobacco Use   • Smoking status: Never Smoker   • Smokeless tobacco: Never Used   Substance and Sexual Activity   • Alcohol use: No   • Drug use: No   • Sexual activity: Yes     Partners: Male     Birth control/protection: Surgical       FAMILY HX  Family History   Problem Relation Age of Onset   • Breast cancer Cousin    • Stroke Mother    • Dementia Mother    • Mitral valve prolapse Mother    • Heart attack Father        Vitals:    06/17/19 1534   BP: 124/82   BP Location: Right arm   Patient Position: Sitting   Pulse: 61   Weight: 83.9 kg (185 lb)   Height: 165.1 cm (65\")       PHYSICAL EXAMINATION:  Physical Exam   Constitutional: She is oriented to person, place, and time. She appears well-developed and well-nourished. No distress.   HENT:   Head: Normocephalic and atraumatic.   Nose: Nose normal.   Mouth/Throat: Uvula is midline, oropharynx is clear and moist and mucous membranes are normal.   Eyes: Conjunctivae and EOM are normal. Pupils are equal, round, and reactive to light. No scleral icterus.   Neck: Normal range of motion. Neck supple. No hepatojugular reflux and no JVD present. Carotid bruit is not present. No tracheal deviation present. No thyromegaly present.   Cardiovascular: Normal rate, regular rhythm, S1 normal, S2 normal, intact distal pulses and normal pulses. PMI is not displaced. Exam reveals no gallop, no distant heart sounds, no friction rub, no midsystolic click and no opening snap.   No murmur heard.  Pulses:       Radial pulses are 2+ on the right side, and 2+ on the left side.        Dorsalis pedis pulses are 2+ on the right side, and 2+ on the left side.        Posterior tibial pulses are 2+ on the right side, and 2+ on the left side.   Pulmonary/Chest: Effort normal and breath sounds normal. She has no wheezes. She has no rhonchi. She has no rales.   Abdominal: Soft. Bowel sounds are normal. She exhibits " no mass. There is no tenderness. There is no guarding.   Musculoskeletal: She exhibits no edema or tenderness.   Lymphadenopathy:     She has no cervical adenopathy.   Neurological: She is alert and oriented to person, place, and time.   Skin: Skin is warm, dry and intact. No rash noted. No cyanosis or erythema. Nails show no clubbing.   Psychiatric: She has a normal mood and affect. Her behavior is normal.   Nursing note and vitals reviewed.      Diagnostic Data:    ECG 12 Lead  Date/Time: 6/21/2019 9:26 AM  Performed by: Jon Ramirez MD  Authorized by: Jon Ramirez MD   Comparison: compared with previous ECG from 8/27/2018  Comparison to previous ECG: Anterior infarct is new   Rhythm: sinus rhythm  Rate: normal  BPM: 61  QRS axis: normal    Clinical impression: abnormal EKG          Lab Results   Component Value Date    TRIG 220 (H) 09/10/2018    HDL 40 (L) 09/10/2018     Lab Results   Component Value Date    GLUCOSE 102 09/10/2018    BUN 18 09/10/2018    CREATININE 1.18 09/10/2018     09/10/2018    K 4.0 09/10/2018     09/10/2018    CO2 25.9 09/10/2018     No results found for: HGBA1C  No results found for: WBC, HGB, HCT, PLT    ASSESSMENT:   Diagnosis Plan   1. Paroxysmal atrial fibrillation (CMS/HCC)     2. Palpitations           PLAN:  Continue flecainide and NOAC.  Pt to continue exercise and weight loss endeavor.      LE cramping on PPI low dose magnesium supplementation rec'd.            Scribed for Jon Ramirez MD by Payton Key PA-C. 6/17/2019  4:12 PM   Jon Ramirez MD, [facility]  I, Jon Ramirez MD, personally performed the services described in this documentation as scribed by the above named individual in my presence, and it is both accurate and complete.  6/17/2019  4:13 PM

## 2019-07-25 ENCOUNTER — HOSPITAL ENCOUNTER (OUTPATIENT)
Dept: MAMMOGRAPHY | Facility: HOSPITAL | Age: 59
Discharge: HOME | End: 2019-07-25
Admitting: FAMILY MEDICINE

## 2019-07-25 DIAGNOSIS — Z12.39 SCREENING BREAST EXAMINATION: ICD-10-CM

## 2019-07-25 PROCEDURE — 77063 BREAST TOMOSYNTHESIS BI: CPT

## 2019-07-25 PROCEDURE — 77063 BREAST TOMOSYNTHESIS BI: CPT | Performed by: RADIOLOGY

## 2019-07-25 PROCEDURE — 77067 SCR MAMMO BI INCL CAD: CPT

## 2019-07-25 PROCEDURE — 77067 SCR MAMMO BI INCL CAD: CPT | Performed by: RADIOLOGY

## 2019-12-13 ENCOUNTER — LAB (OUTPATIENT)
Dept: LAB | Facility: HOSPITAL | Age: 59
End: 2019-12-13

## 2019-12-13 ENCOUNTER — TRANSCRIBE ORDERS (OUTPATIENT)
Dept: ADMINISTRATIVE | Facility: HOSPITAL | Age: 59
End: 2019-12-13

## 2019-12-13 DIAGNOSIS — R19.7 DIARRHEA OF PRESUMED INFECTIOUS ORIGIN: ICD-10-CM

## 2019-12-13 DIAGNOSIS — R19.7 DIARRHEA OF PRESUMED INFECTIOUS ORIGIN: Primary | ICD-10-CM

## 2019-12-13 PROCEDURE — 87324 CLOSTRIDIUM AG IA: CPT

## 2019-12-14 LAB — C DIFF TOX A+B STL QL IA: NEGATIVE

## 2019-12-31 RX ORDER — ESTRADIOL 0.5 MG/1
TABLET ORAL
Qty: 30 TABLET | Refills: 1 | Status: SHIPPED | OUTPATIENT
Start: 2019-12-31 | End: 2020-02-27

## 2020-01-06 ENCOUNTER — OFFICE VISIT (OUTPATIENT)
Dept: GYNECOLOGIC ONCOLOGY | Facility: CLINIC | Age: 60
End: 2020-01-06

## 2020-01-06 VITALS
DIASTOLIC BLOOD PRESSURE: 71 MMHG | RESPIRATION RATE: 16 BRPM | BODY MASS INDEX: 32.11 KG/M2 | HEIGHT: 65 IN | OXYGEN SATURATION: 97 % | WEIGHT: 192.7 LBS | HEART RATE: 68 BPM | SYSTOLIC BLOOD PRESSURE: 137 MMHG | TEMPERATURE: 97.8 F

## 2020-01-06 DIAGNOSIS — Z01.419 WELL WOMAN EXAM WITH ROUTINE GYNECOLOGICAL EXAM: Primary | ICD-10-CM

## 2020-01-06 DIAGNOSIS — Z79.890 HORMONE REPLACEMENT THERAPY (HRT): ICD-10-CM

## 2020-01-06 PROBLEM — I48.0 PAROXYSMAL ATRIAL FIBRILLATION: Status: ACTIVE | Noted: 2020-01-06

## 2020-01-06 PROBLEM — M54.50 LOW BACK PAIN: Status: ACTIVE | Noted: 2020-01-06

## 2020-01-06 PROBLEM — E78.5 HYPERLIPIDEMIA, UNSPECIFIED: Status: ACTIVE | Noted: 2020-01-06

## 2020-01-06 PROCEDURE — 99396 PREV VISIT EST AGE 40-64: CPT | Performed by: NURSE PRACTITIONER

## 2020-01-06 NOTE — PROGRESS NOTES
GYN ONCOLOGY ANNUAL WELL WOMAN VISIT      Mercedes Dale  [id number]  1960      Chief Complaint: Annual Exam        History of present illness:  Mercedes Dale is a 59 y.o. year old female who is here today for an annual exam. She has a history of rectocele and is status post hysterectomy with repair, pathology benign.  She has been on HRT since that time with Estrace 0.5 mg p.o. daily.  She describes occasional night sweats, but feels well overall.  She is started a new exercise regimen with HIIT twice weekly, strength conditioning, and yoga. She reports she is feeling very well today and has no complaints. She denies vaginal bleeding, pelvic pain, changes in bowel or bladder function, new or concerning lesions, and breast problems.  Mammogram and colonoscopy are currently up-to-date.        Obstetric History:  OB History        3    Para   2    Term   2            AB   1    Living           SAB   1    TAB        Ectopic        Molar        Multiple        Live Births                   Menstrual History:     No LMP recorded. Patient has had a hysterectomy.          Past Medical History:   Diagnosis Date   • Abnormal heart rhythm    • Anxiety 2016   • Anxiety    • Constipation 2016   • Decreased libido    • History of echocardiogram    • History of Holter monitoring    • History of stress test    • Hot flashes 2016   • Hyperlipidemia    • Hypertension 2016   • Insomnia 2016   • Mitral valve prolapse    • GEORGE (stress urinary incontinence, female) 2016       Past Surgical History:   Procedure Laterality Date   • GALLBLADDER SURGERY     • KNEE ARTHROSCOPY      scope twice   • LAPAROSCOPIC ASSISTED VAGINAL HYSTERECTOMY SALPINGO OOPHORECTOMY      51       MEDICATIONS: The current medication list was reviewed and reconciled.     Allergies:  is allergic to codeine.    Family History   Problem Relation Age of Onset   • Breast cancer Cousin    • Stroke Mother    • Dementia  "Mother    • Mitral valve prolapse Mother    • Heart attack Father        Health Maintenance:  Last mammogram was 7/25/2019. Last colonoscopy was 2016, with recommended follow-up in 5-10 year(s). Last pap smear was 12/18/2018, results were  normal PAP..      Review of Systems   Constitutional: Negative for fatigue, fever and unexpected weight change.   HENT: Negative for congestion, ear pain, hearing loss, sinus pressure and trouble swallowing.    Eyes: Negative for visual disturbance.   Respiratory: Negative for cough, chest tightness, shortness of breath and wheezing.    Cardiovascular: Negative for chest pain, palpitations and leg swelling.   Gastrointestinal: Negative for abdominal distention, abdominal pain, constipation, diarrhea, nausea and vomiting.   Endocrine: Negative for cold intolerance, heat intolerance, polydipsia, polyphagia and polyuria.   Genitourinary: Negative for difficulty urinating, dyspareunia, dysuria, frequency, hematuria, pelvic pain, urgency, vaginal bleeding, vaginal discharge and vaginal pain.   Musculoskeletal: Negative for arthralgias, gait problem, joint swelling and myalgias.   Skin: Negative for color change, pallor and rash.   Neurological: Negative for dizziness, seizures, syncope, weakness, light-headedness, numbness and headaches.   Hematological: Negative for adenopathy. Does not bruise/bleed easily.   Psychiatric/Behavioral: Negative for agitation, confusion, sleep disturbance and suicidal ideas. The patient is not nervous/anxious.         Physical Exam  Vital Signs: /71   Pulse 68   Temp 97.8 °F (36.6 °C) (Temporal)   Resp 16   Ht 165.1 cm (65\")   Wt 87.4 kg (192 lb 11.2 oz)   SpO2 97%   BMI 32.07 kg/m²   Pain Score    01/06/20 1410   PainSc: 0-No pain      General Appearance:  alert, cooperative, no apparent distress and appears stated age   Neurologic/Psychiatric: A&O x 3, gait steady, appropriate affect   HEENT:  Normocephalic, without obvious abnormality, " mucous membranes moist   Neck: Supple, symmetrical, trachea midline, no adenopathy;  No thyromegaly, masses, or tenderness   Back:   Symmetric, no curvature, ROM normal, no CVA tenderness   Lungs:   Clear to auscultation bilaterally; respirations regular, even, and unlabored bilaterally   Heart:  Regular rate and rhythm, no murmurs appreciated   Breasts:  Symmetrical, no masses, no lesions and no nipple discharge   Abdomen:   Soft, non-tender, non-distended and no organomegaly   Lymph nodes: No cervical, supraclavicular, inguinal or axillary adenopathy noted   Extremities: Normal, atraumatic; no clubbing, cyanosis, or edema    Skin: No rashes, ulcers, or suspicious lesions noted   Pelvic: External Genitalia  without lesions or skin changes  Vagina  is pink, moist, without lesions.   Vaginal Cuff  Female Vaginal Cuff: smooth, intact, without visible lesions and pap obtained  Uterus  surgically absent  Ovaries  surgically absent bilaterally  Parametria  smooth  Rectovaginal  Female rectovaginal: confirms no masses or bleeding and Hemoccult negative         Procedure Note:  No notes on file    Assessment and Plan:    Mercedes was seen today for annual exam.    Diagnoses and all orders for this visit:    Well woman exam with routine gynecological exam  -     Pap IG, Rfx HPV ASCU; Future    Hormone replacement therapy (HRT)        Pap was done today. If she does not receive the results of the Pap within 2 weeks  time, she was instructed to call to find out the results.      She was encouraged to get yearly mammograms.  She should report any palpable breast lump(s) or skin changes regardless of mammographic findings.  I explained to Mercedes that notification regarding her mammogram results will come from the center performing the study.  Our office will not be routinely calling with mammogram results.  It is her responsibility to make sure that the results from the mammogram are communicated to her by the breast center.  If  she has any questions about the results, she is welcome to call our office anytime.    Repeat colonoscopy [id number] or sooner if new problems.     She was recommended to begin bone density scans (DEXA) at age 60-65 for osteoporosis screening.      Return to clinic in 1 year for Annual exam.      DARIO Tolentino

## 2020-02-27 RX ORDER — ESTRADIOL 0.5 MG/1
TABLET ORAL
Qty: 30 TABLET | Refills: 2 | Status: SHIPPED | OUTPATIENT
Start: 2020-02-27 | End: 2020-05-27

## 2020-03-19 RX ORDER — FLECAINIDE ACETATE 50 MG/1
50 TABLET ORAL 2 TIMES DAILY
Qty: 180 TABLET | Refills: 3 | Status: SHIPPED | OUTPATIENT
Start: 2020-03-19 | End: 2021-03-31

## 2020-05-20 ENCOUNTER — TELEPHONE (OUTPATIENT)
Dept: CARDIOLOGY | Facility: CLINIC | Age: 60
End: 2020-05-20

## 2020-05-20 DIAGNOSIS — I48.0 PAROXYSMAL ATRIAL FIBRILLATION (HCC): Primary | ICD-10-CM

## 2020-05-20 NOTE — TELEPHONE ENCOUNTER
"Patient called to report her  thought she should call to advise Dr. Ramirez that she has experienced feeling \"some kick in chest\".  She had one that woke her up in the middle of the night last night.  This has been going on for about 4-6 weeks, usually during the day.  Have had 2 instances where I felt like an elephant was sitting on my chest.  Shooting pains in center of heart.   She states she has not sought evaluation for these episodes due to the Covid 19 pandemic.  She reports her PCP did have her  on some high dose prednisone for poison oak, unsure if that relates to episodes or not.  She has not been checking blood pressures or heart rates at home.   She has not been to her PCP to have an EKG or any blood work recently.   "

## 2020-05-21 DIAGNOSIS — R00.2 PALPITATIONS: Primary | ICD-10-CM

## 2020-05-27 RX ORDER — ESTRADIOL 0.5 MG/1
TABLET ORAL
Qty: 30 TABLET | Refills: 11 | Status: SHIPPED | OUTPATIENT
Start: 2020-05-27 | End: 2021-06-01

## 2020-07-01 ENCOUNTER — TELEPHONE (OUTPATIENT)
Dept: GYNECOLOGIC ONCOLOGY | Facility: CLINIC | Age: 60
End: 2020-07-01

## 2020-07-01 NOTE — TELEPHONE ENCOUNTER
PT IS HAVING SOME PELVIC PRESSURE FOR THE PAST SEVERAL MONTHS HAS GOTTEN A LITTLE WORSE ESPECIALLY WHEN SHE BENDS OVER POT IS CONCERNED AND WANTED ADVISE FROM [facility] CONTACT # [phone]

## 2020-07-01 NOTE — TELEPHONE ENCOUNTER
Attempted to call patient no answer.    I told her I would send her a message on [employer] to ask further questions.

## 2020-07-06 DIAGNOSIS — R00.2 PALPITATIONS: Primary | ICD-10-CM

## 2020-07-15 ENCOUNTER — OFFICE VISIT (OUTPATIENT)
Dept: GYNECOLOGIC ONCOLOGY | Facility: CLINIC | Age: 60
End: 2020-07-15

## 2020-07-15 VITALS
HEART RATE: 59 BPM | OXYGEN SATURATION: 98 % | SYSTOLIC BLOOD PRESSURE: 129 MMHG | TEMPERATURE: 98 F | DIASTOLIC BLOOD PRESSURE: 74 MMHG | BODY MASS INDEX: 30.79 KG/M2 | RESPIRATION RATE: 12 BRPM | WEIGHT: 185 LBS

## 2020-07-15 DIAGNOSIS — N81.10 VAGINAL PROLAPSE: ICD-10-CM

## 2020-07-15 DIAGNOSIS — N81.11 MIDLINE CYSTOCELE: Primary | ICD-10-CM

## 2020-07-15 PROCEDURE — 99214 OFFICE O/P EST MOD 30 MIN: CPT | Performed by: NURSE PRACTITIONER

## 2020-07-20 NOTE — PROGRESS NOTES
GYN ONCOLOGY FOLLOW-UP    Mercedes Dale  [id number]  1960    Chief Complaint: Follow-up (c/o pelvic pressure)        History of present illness:  Mercedes Dale is a 59 y.o. year old female who is here today for complaint of pelvic pressure. She has a history of rectocele and is status post hysterectomy with repair, pathology benign. She c/o new pelvic pressure over the last few months, mostly on the right side of the pelvis. Symptoms are most notable when bending over, working in her garden, carrying groceries, or lifting a grandchild. She denies tissue protruding through the vagina. She denies changes in bowel or bladder function.         Past Medical History:   Diagnosis Date   • Abnormal heart rhythm    • Anxiety 9/1/2016   • Anxiety    • Constipation 9/1/2016   • Decreased libido    • History of echocardiogram    • History of Holter monitoring    • History of stress test    • Hot flashes 9/1/2016   • Hyperlipidemia    • Hypertension 9/1/2016   • Insomnia 9/1/2016   • Mitral valve prolapse    • GEORGE (stress urinary incontinence, female) 9/1/2016       Past Surgical History:   Procedure Laterality Date   • GALLBLADDER SURGERY  2012   • KNEE ARTHROSCOPY      scope twice   • LAPAROSCOPIC ASSISTED VAGINAL HYSTERECTOMY SALPINGO OOPHORECTOMY      51       MEDICATIONS: The current medication list was reviewed and reconciled.     Allergies:  is allergic to codeine.    Family History   Problem Relation Age of Onset   • Breast cancer Cousin    • Stroke Mother    • Dementia Mother    • Mitral valve prolapse Mother    • Heart attack Father        Review of Systems   Constitutional: Negative for appetite change, chills, fatigue, fever and unexpected weight change.   Respiratory: Negative for cough, shortness of breath and wheezing.    Cardiovascular: Negative for chest pain, palpitations and leg swelling.   Gastrointestinal: Negative for abdominal distention, abdominal pain, blood in stool, constipation, diarrhea,  nausea and vomiting.   Endocrine: Negative.    Genitourinary: Positive for pelvic pain (pressure with lifting/certian positions). Negative for dyspareunia, dysuria, frequency, genital sores, hematuria, urgency, vaginal bleeding, vaginal discharge and vaginal pain.   Musculoskeletal: Negative for arthralgias, gait problem and joint swelling.   Neurological: Negative for dizziness, seizures, syncope, weakness, light-headedness, numbness and headaches.   Hematological: Negative for adenopathy.   Psychiatric/Behavioral: Negative.          Physical Exam  Vital Signs: /74   Pulse 59   Temp 98 °F (36.7 °C)   Resp 12   Wt 83.9 kg (185 lb)   SpO2 98%   BMI 30.79 kg/m²   Vitals:    07/15/20 1308   PainSc: 0-No pain           General Appearance:  alert, cooperative, no apparent distress and appears stated age   Neurologic/Psychiatric: A&O x 3, gait steady, appropriate affect   HEENT:  Normocephalic, without obvious abnormality, mucous membranes moist   Lungs:   Clear to auscultation bilaterally; respirations regular, even, and unlabored bilaterally   Heart:  Regular rate and rhythm, no murmurs appreciated   Breasts:  deferred   Abdomen:   Soft, non-tender, non-distended and no organomegaly   Lymph nodes: No inguinal adenopathy noted   Extremities: Normal, atraumatic; no clubbing, cyanosis, or edema    Pelvic: External Genitalia  without lesions or skin changes  Vagina  is pink, moist, without lesions, and reveals significant prolapse. Laxity of right and anterior vaginal wall noted. Grade 2-3 cystocele and prolapse of vaginal apex noted with valsalva.   Vaginal Cuff  Female Vaginal Cuff: smooth, intact, without visible lesions and descends with valsalva  Uterus  surgically absent and no palpable masses  Ovaries  surgically absent bilaterally  Parametria  smooth  Rectovaginal  Female rectovaginal: confirms no masses or bleeding and Hemoccult negative       Procedure Notes:  No notes on file    Assessment and Plan:     Mercedes was seen today for follow-up.    Diagnoses and all orders for this visit:    Midline cystocele  -     Ambulatory Referral to Physical Therapy Pelvic Floor    Vaginal prolapse  -     Ambulatory Referral to Physical Therapy Pelvic Floor        Patient and I discussed her symptoms and physical exam findings. Patient informed of prolapse of vaginal apex and cystocele. We discussed that previous repairs can fail over time, symptoms can worsen with increased abdominal pressure, lifting, etc. Patient would like to begin with conservative management. I recommended referral for pelvic floor PT. If this is ineffective or she desires definitive surgical management, will refer to Urogynecology at . She v/u.     Pain assessment was performed today as a part of patient’s care.  For patients with pain related to surgery, gynecologic malignancy or cancer treatment, the plan is as noted in the assessment/plan.  For patients with pain not related to these issues, they are to seek any further needed care from a more appropriate provider, such as PCP.        Return to clinic for Annual Exam as scheduled.       DARIO Tolentino

## 2020-07-31 ENCOUNTER — TELEPHONE (OUTPATIENT)
Dept: CARDIOLOGY | Facility: CLINIC | Age: 60
End: 2020-07-31

## 2020-07-31 NOTE — TELEPHONE ENCOUNTER
Spoke with patient regarding most recent holter monitor and per  it did not show anything new and pt is to continue current regimen.

## 2020-08-05 ENCOUNTER — TREATMENT (OUTPATIENT)
Dept: PHYSICAL THERAPY | Facility: CLINIC | Age: 60
End: 2020-08-05

## 2020-08-05 DIAGNOSIS — R10.2 PELVIC PAIN: ICD-10-CM

## 2020-08-05 DIAGNOSIS — N81.10 VAGINAL PROLAPSE: Primary | ICD-10-CM

## 2020-08-05 DIAGNOSIS — R32 URINARY INCONTINENCE IN FEMALE: ICD-10-CM

## 2020-08-05 DIAGNOSIS — N81.4 CYSTOCELE WITH PROLAPSE: ICD-10-CM

## 2020-08-05 DIAGNOSIS — M62.81 MUSCLE WEAKNESS: ICD-10-CM

## 2020-08-05 PROCEDURE — 97162 PT EVAL MOD COMPLEX 30 MIN: CPT | Performed by: PHYSICAL THERAPIST

## 2020-08-05 PROCEDURE — 97110 THERAPEUTIC EXERCISES: CPT | Performed by: PHYSICAL THERAPIST

## 2020-08-05 NOTE — PROGRESS NOTES
Physical Therapy Initial Evaluation and Plan of Care    Patient: Mercedes Dale   : 1960  Diagnosis/ICD-10 Code:  Vaginal prolapse [N81.10]  Referring practitioner: JAMES Tolentino  Date of Initial Visit: 2020  Today's Date: 2020  Patient seen for 1 sessions      Subjective    Pt reports recent onset of pressure symptoms worsening over last few months especially with lifting grandchild, bending over for gardening. Noticed more planting garden this year with weeding. Feels like getting a testicle. Really bad since March waited to go to doctor. Noticed carrying groceries felt it or if walks a lot. Describes as heaviness    Chief Complaint:   Chief Complaint   Patient presents with   • Pelvis - Bladder Prolapse, Initial Evaluation      Functional Outcome Measure:     Pain  Average:Pelvic #: 4 Best: Pelvic #: 0 Worst: Pelvic #: 5  Location: vagina    Bladder function:    Incontinence: yes  # of pads in 24 hours: not using   What specifically makes you leak: cough/sneeze/laugh sometimes with walking briskly  Leak at night: 0  Urination at night: 0  Use bathroom “just in case”: yes  Strong urinary urge: no  Leaking with urge: no  Urge triggers: none  Complete bladder voiding: reports feels voiding well  Urinary splaying: no  Post-micturition dribble: yes  Frequency of urination: every 3-4 hours  Discomfort with urination: denies  Vaginal or anal itching: denies  Fluid irritants consumed daily: only drinks water      Bowel function:  Has to splint. Takes Thrive for women and softens stool  How many episodes of leakage/month: none   How many BM's/day: every 1-3 days if harder stool  Christmas Stool Scale Type: 4 or 2  Discomfort with BM: 6/10      Sexual activity  Sexually active: no issues other than occasional need to change angles      Prior PT or other treatment: denies    Diagnostics:    PMH:   Past Medical History:   Diagnosis Date   • Abnormal heart rhythm    • Anxiety 2016   • Anxiety     • Constipation 2016   • Decreased libido    • History of echocardiogram    • History of Holter monitoring    • History of stress test    • Hot flashes 2016   • Hyperlipidemia    • Hypertension 2016   • Insomnia 2016   • Mitral valve prolapse    • GEORGE (stress urinary incontinence, female) 2016        Surgical HX:   Past Surgical History:   Procedure Laterality Date   • GALLBLADDER SURGERY     • KNEE ARTHROSCOPY      scope twice   • LAPAROSCOPIC ASSISTED VAGINAL HYSTERECTOMY SALPINGO OOPHORECTOMY      51        Menstrual cycle: n/a    Birth HX (#, when, type of delivery, complications):  - first vaginal, second  vulvar varicosity on L side after delivery    Meds:   Current Outpatient Medications:   •  ALPRAZolam (XANAX) 1 MG tablet, 1 mg As Needed., Disp: , Rfl:   •  apixaban (ELIQUIS) 5 MG tablet tablet, Take 1 tablet by mouth Every 12 (Twelve) Hours., Disp: 180 tablet, Rfl: 3  •  atorvastatin (LIPITOR) 10 MG tablet, 10 mg Daily., Disp: , Rfl:   •  CARTIA  MG 24 hr capsule, 240 mg Daily., Disp: , Rfl:   •  estradiol (ESTRACE) 0.5 MG tablet, TAKE ONE TABLET BY MOUTH DAILY, Disp: 30 tablet, Rfl: 11  •  flecainide (TAMBOCOR) 50 MG tablet, Take 1 tablet by mouth 2 (Two) Times a Day., Disp: 180 tablet, Rfl: 3  •  Multiple Vitamins-Minerals (THRIVE FOR LIFE WOMENS) tablet, Take  by mouth Daily., Disp: , Rfl:   •  pantoprazole (PROTONIX) 40 MG EC tablet, 40 mg Daily., Disp: , Rfl:   •  polyethylene glycol (MIRALAX) packet, Take 17 g by mouth As Needed., Disp: , Rfl:   •  zolpidem (AMBIEN) 10 MG tablet, 10 mg At Night As Needed., Disp: , Rfl:      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 Tore quad and quit the class. Doing a lot of crunches.            Objective      Patient gave consent for internal pelvic floor  examination and treatment.     Palpation:   Supine:   Abdominals, Iliacus, psoas B - unremarkable  Adductors: unremarkable    External:    Unremarkable for tension B  Sensation intact  Noted vulvar varicosity on L    Internal:   Sensation: intact B  Bulge: complete  Knack: absent  Wall Laxity: moderate anterior and apical laxity     L/R supf mm: unremarkable for tension B   Internal supf perineal body:unremarkable for tension B   Levator ani: unremarkable for tension B   Obturator internus: unremarkable for tension B   Compressor urethra: unremarkable for tension B    PERF SCALE:  Power: 2    Endurance: 7    Repetitions: 2    Fast twitch: 4        Assessment/Plan:     The patient is a 59 y.o. female who presents to physical therapy today for pelvic organ prolapse, vaginal pressure/heaviness, stress urinary incontinence. Upon initial evaluation, the patient demonstrates the following impairments: decreased strength and endurance of pelvic floor muscles. Due to these impairments, the patient is unable to garden, lift grandchild,  groceries or exercise without vaginal pressure/discomfort. The patient would benefit from skilled pelvic PT services to address functional limitations and impairments and to improve patient quality of life.      Goals:   STG's: 4 weeks  · Patient will report > 25% reduction in need to splint with BM  · Patient will report understanding of prolapse precautions   · Patient will report no more than 2/10 discomfort with vaginal pressure x 1 week for improved function with daily activities  · Patient will be able to perform HEP with minimal verbal cues    LTG's: By discharge  · Patient will report a decrease in pain to < 1/10 with bending, lifting for improved function with gardening  · Patient will report >85% improvement in vaginal pressure symptoms   · Patient will report no stress incontinence x 2 weeks for improved QOL  · Patient will be independent with HEP      Plan  Therapy options:  will be seen for skilled physical therapy services  Planned modality interventions: TENS, ultrasound, cryotherapy, thermotherapy (hydrocollator packs) and high voltage pulsed current (pain management)  Planned therapy interventions: abdominal trunk stabilization, manual therapy, neuromuscular re-education, body mechanics training, flexibility, functional ROM exercises, gait training, home exercise program, joint mobilization, therapeutic activities, stretching, strengthening, spinal/joint mobilization, soft tissue mobilization and postural training  Pt prognosis: good  Frequency: weekly - pt requests to come every 2 weeks due to travel for appointments  Duration in visits: 8  Duration in weeks: 16  Treatment plan discussed with: patient    1115/1200  Timed:         Manual Therapy:    0     mins  19577;     Therapeutic Exercise:    8     mins  17178;     Neuromuscular Erendira:    0    mins  67284;    Therapeutic Activity:     0     mins  30636;     Gait Trainin     mins  61772;     Ultrasound:     0     mins  19861;    Ionto                               0    mins   46293  Self Care                       0     mins   61062  Canalith Repos    0     mins 40628      Un-Timed:  Electrical Stimulation:    0     mins  77539 ( );  Dry Needling     0     mins self-pay  Traction     0     mins 13515  Low Eval     0     Mins  52059  Mod Eval     37     Mins  65929  High Eval                       0     Mins  85901  Re-Eval                           0    mins  73734        Timed Treatment:   8   mins   Total Treatment:     45   mins    PT SIGNATURE:Noah Vidal PT, DPT  DATE TREATMENT INITIATED: 2020    Initial Certification  Certification Period: 11/3/2020  I certify that the therapy services are furnished while this patient is under my care.  The services outlined above are required by this patient, and will be reviewed every 90 days.     PHYSICIAN: Estefanía Hernandez, APRN      DATE: 2020      Please sign and return via fax to [phone]. Thank you, [facility].

## 2020-09-03 ENCOUNTER — TREATMENT (OUTPATIENT)
Dept: PHYSICAL THERAPY | Facility: CLINIC | Age: 60
End: 2020-09-03

## 2020-09-03 DIAGNOSIS — N81.4 CYSTOCELE WITH PROLAPSE: ICD-10-CM

## 2020-09-03 DIAGNOSIS — M62.81 MUSCLE WEAKNESS: ICD-10-CM

## 2020-09-03 DIAGNOSIS — R32 URINARY INCONTINENCE IN FEMALE: ICD-10-CM

## 2020-09-03 DIAGNOSIS — N81.10 VAGINAL PROLAPSE: Primary | ICD-10-CM

## 2020-09-03 DIAGNOSIS — R10.2 PELVIC PAIN: ICD-10-CM

## 2020-09-03 PROCEDURE — 97112 NEUROMUSCULAR REEDUCATION: CPT | Performed by: PHYSICAL THERAPIST

## 2020-09-03 NOTE — PROGRESS NOTES
Physical Therapy Daily Progress Note  Patient: Mercedes Dale   : 1960  Diagnosis/ICD-10 Code:  Vaginal prolapse [N81.10]  Referring practitioner: JAMES Tolentino  Date of Initial Visit: Type: THERAPY  Noted: 2020  Today's Date: 9/3/2020  Patient seen for 2 sessions      Subjective   Mercedes Dale reports she had been exposed to someone quarantining from COVID and couldn't come in. Reports exercises are working. Doing HEP at least once per day.   Pain Rating (0-10): 0      Objective   verbal consent obtained for internal pelvic exam/treatment with declined need for second person in room   SEMG: supine rest 0.8, avg 3.7, max 11.7  Sit: rest 0.8, avg 6.5, max 13.4      See Exercise, Manual, and Modality Logs for complete treatment.     Patient Education: affects of pelvic floor mm strengthening on bowel emptying    Assessment/Plan  Pt doing well with strengthening and reports increased feeling of strength of pelvic floor with decreasing symptoms. She tolerated exercise with SEMG today well and demonstrated improvement with long holds with visual and verbal feedback. HEP progressed to include resistance training in sitting. Will continue to progress strengthening as indicated to reduce prolapse symptoms.    Progress per Plan of Care         1107/1145   Timed:         Manual Therapy:    0     mins  66848;     Therapeutic Exercise:    0     mins  93664;     Neuromuscular Erendira:    38    mins  29600;    Therapeutic Activity:     0     mins  58901;     Gait Trainin     mins  28217;     Ultrasound:     0     mins  16242;    Ionto                               0    mins   14628  Self Care                       0     mins   59016  Canalith Repos               0    mins  49182    Un-Timed:  Electrical Stimulation:    0     mins  73445 ( );  Dry Needling     0     mins self-pay  Traction     0     mins 82241  Low Eval     0     Mins  00316  Mod Eval     0     Mins  53026  High Eval                        0     Mins  87434  Re-Eval                           0    mins  88642    Timed Treatment:   38   mins   Total Treatment:     38   mins    Noah Vidal, PT  [place] License # [id number]  Physical Therapist

## 2020-09-16 ENCOUNTER — OFFICE VISIT (OUTPATIENT)
Dept: CARDIOLOGY | Facility: CLINIC | Age: 60
End: 2020-09-16

## 2020-09-16 VITALS
BODY MASS INDEX: 30.25 KG/M2 | DIASTOLIC BLOOD PRESSURE: 84 MMHG | HEIGHT: 66 IN | HEART RATE: 56 BPM | SYSTOLIC BLOOD PRESSURE: 138 MMHG | TEMPERATURE: 98 F | WEIGHT: 188.2 LBS | OXYGEN SATURATION: 97 %

## 2020-09-16 DIAGNOSIS — I48.0 PAROXYSMAL ATRIAL FIBRILLATION (HCC): ICD-10-CM

## 2020-09-16 DIAGNOSIS — E78.2 MIXED HYPERLIPIDEMIA: ICD-10-CM

## 2020-09-16 DIAGNOSIS — I20.0 UNSTABLE ANGINA PECTORIS (HCC): Primary | ICD-10-CM

## 2020-09-16 DIAGNOSIS — I10 ESSENTIAL HYPERTENSION: ICD-10-CM

## 2020-09-16 PROCEDURE — 99214 OFFICE O/P EST MOD 30 MIN: CPT | Performed by: INTERNAL MEDICINE

## 2020-09-16 PROCEDURE — 93000 ELECTROCARDIOGRAM COMPLETE: CPT | Performed by: INTERNAL MEDICINE

## 2020-09-16 RX ORDER — DIMENHYDRINATE 50 MG/1
TABLET ORAL AS NEEDED
COMMUNITY
Start: 2020-02-12 | End: 2021-01-07

## 2020-09-16 RX ORDER — SCOLOPAMINE TRANSDERMAL SYSTEM 1 MG/1
1 PATCH, EXTENDED RELEASE TRANSDERMAL AS NEEDED
COMMUNITY
Start: 2020-02-12 | End: 2022-01-04

## 2020-09-16 NOTE — PROGRESS NOTES
[facility] at [facility]     Mercedes Dale  1960  [phone]    VISIT DATE:  09/16/20    PCP: Keegan De Jesus MD  [address] [place] [address]    IDENTIFICATION: A 59 y.o. 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    CC:  Chief Complaint   Patient presents with   • PAF       PROBLEM LIST:  1. Palpitations  1. 9/5/18 echo: EF 60% mild AI  2. 9/18 Event monitor - short lived afib  2. HL  1. 9/10/18   HDL 40 LDL 88, on atorvastatin  3. Gerd    Allergies  Allergies   Allergen Reactions   • Codeine Nausea And Vomiting       Current Medications    Current Outpatient Medications:   •  ALPRAZolam (XANAX) 1 MG tablet, 1 mg As Needed. 1/2 TABLET AS NEEDED, Disp: , Rfl:   •  apixaban (ELIQUIS) 5 MG tablet tablet, Take 1 tablet by mouth Every 12 (Twelve) Hours., Disp: 180 tablet, Rfl: 3  •  atorvastatin (LIPITOR) 10 MG tablet, 10 mg Daily., Disp: , Rfl:   •  CARTIA  MG 24 hr capsule, 240 mg Daily., Disp: , Rfl:   •  dimenhyDRINATE (DRAMAMINE) 50 MG tablet, Take  by mouth As Needed., Disp: , Rfl:   •  estradiol (ESTRACE) 0.5 MG tablet, TAKE ONE TABLET BY MOUTH DAILY, Disp: 30 tablet, Rfl: 11  •  flecainide (TAMBOCOR) 50 MG tablet, Take 1 tablet by mouth 2 (Two) Times a Day., Disp: 180 tablet, Rfl: 3  •  Multiple Vitamins-Minerals (THRIVE FOR LIFE WOMENS) tablet, Take  by mouth Daily., Disp: , Rfl:   •  pantoprazole (PROTONIX) 40 MG EC tablet, 40 mg Daily., Disp: , Rfl:   •  polyethylene glycol (MIRALAX) packet, Take 17 g by mouth As Needed., Disp: , Rfl:   •  Scopolamine (TRANSDERM-SCOP) 1.5 MG/3DAYS patch, Place 1 patch on the skin as directed by provider As Needed. WHEN TRAVELING ONLY, Disp: , Rfl:   •  zolpidem (AMBIEN) 10 MG tablet, 5 mg At Night As Needed. 1/2 TABLET, Disp: , Rfl:      History of Present Illness   HPI    Pt in fu.  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  Some substernal chest discomfort fullness pressure sensation.    Vitals:     "09/16/20 1356   BP: 138/84   BP Location: Left arm   Patient Position: Sitting   Pulse: 56   Temp: 98 °F (36.7 °C)   SpO2: 97%   Weight: 85.4 kg (188 lb 3.2 oz)   Height: 167.6 cm (66\")       PHYSICAL EXAMINATION:  Physical Exam   Constitutional: She is oriented to person, place, and time. She appears well-developed and well-nourished. No distress.   HENT:   Head: Normocephalic and atraumatic.   Nose: Nose normal.   Mouth/Throat: Uvula is midline, oropharynx is clear and moist and mucous membranes are normal.   Eyes: Pupils are equal, round, and reactive to light. Conjunctivae and EOM are normal. No scleral icterus.   Neck: Normal range of motion. Neck supple. No hepatojugular reflux and no JVD present. Carotid bruit is not present. No tracheal deviation present. No thyromegaly present.   Cardiovascular: Normal rate, regular rhythm, S1 normal, S2 normal, intact distal pulses and normal pulses. PMI is not displaced. Exam reveals no gallop, no distant heart sounds, no friction rub, no midsystolic click and no opening snap.   No murmur heard.  Pulses:       Radial pulses are 2+ on the right side and 2+ on the left side.        Dorsalis pedis pulses are 2+ on the right side and 2+ on the left side.        Posterior tibial pulses are 2+ on the right side and 2+ on the left side.   Pulmonary/Chest: Effort normal and breath sounds normal. She has no wheezes. She has no rhonchi. She has no rales.   Abdominal: Soft. Bowel sounds are normal. She exhibits no mass. There is no abdominal tenderness. There is no guarding.   Musculoskeletal:         General: No tenderness or edema.   Lymphadenopathy:     She has no cervical adenopathy.   Neurological: She is alert and oriented to person, place, and time.   Skin: Skin is warm, dry and intact. No rash noted. No cyanosis or erythema. Nails show no clubbing.   Psychiatric: She has a normal mood and affect. Her behavior is normal.   Nursing note and vitals reviewed.      Diagnostic " Data:    ECG 12 Lead    Date/Time: 9/16/2020 3:48 PM  Performed by: Jon Ramirez MD  Authorized by: Jon Ramirez MD   Previous ECG: no previous ECG available  Rhythm: sinus rhythm  BPM: 55    Clinical impression: non-specific ECG             Lab Results   Component Value Date    TRIG 220 (H) 09/10/2018    HDL 40 (L) 09/10/2018     Lab Results   Component Value Date    GLUCOSE 102 09/10/2018    BUN 18 09/10/2018    CREATININE 1.18 09/10/2018     09/10/2018    K 4.0 09/10/2018     09/10/2018    CO2 25.9 09/10/2018     No results found for: HGBA1C  No results found for: WBC, HGB, HCT, PLT    ASSESSMENT:   Diagnosis Plan   1. Unstable angina pectoris (CMS/HCC)     2. Paroxysmal atrial fibrillation (CMS/HCC)     3. Essential hypertension     4. Mixed hyperlipidemia           PLAN:  Continue flecainide and NOAC.  Pt to continue exercise and weight loss endeavor.      Stress test to evaluate for anginal equivalent on flecainide    Hypertension controlled on current Cartia    Dyslipidemia on statin therapy             9/16/2020  15:49 EDT

## 2020-10-29 ENCOUNTER — TREATMENT (OUTPATIENT)
Dept: PHYSICAL THERAPY | Facility: CLINIC | Age: 60
End: 2020-10-29

## 2020-10-29 DIAGNOSIS — R32 URINARY INCONTINENCE IN FEMALE: ICD-10-CM

## 2020-10-29 DIAGNOSIS — N81.10 VAGINAL PROLAPSE: Primary | ICD-10-CM

## 2020-10-29 DIAGNOSIS — M62.81 MUSCLE WEAKNESS: ICD-10-CM

## 2020-10-29 DIAGNOSIS — N81.4 CYSTOCELE WITH PROLAPSE: ICD-10-CM

## 2020-10-29 DIAGNOSIS — R10.2 PELVIC PAIN: ICD-10-CM

## 2020-10-29 PROCEDURE — 97530 THERAPEUTIC ACTIVITIES: CPT | Performed by: PHYSICAL THERAPIST

## 2020-10-29 PROCEDURE — 97164 PT RE-EVAL EST PLAN CARE: CPT | Performed by: PHYSICAL THERAPIST

## 2020-10-29 PROCEDURE — 97112 NEUROMUSCULAR REEDUCATION: CPT | Performed by: PHYSICAL THERAPIST

## 2021-01-07 ENCOUNTER — OFFICE VISIT (OUTPATIENT)
Dept: GYNECOLOGIC ONCOLOGY | Facility: CLINIC | Age: 61
End: 2021-01-07

## 2021-01-07 VITALS
BODY MASS INDEX: 30.13 KG/M2 | OXYGEN SATURATION: 97 % | SYSTOLIC BLOOD PRESSURE: 143 MMHG | DIASTOLIC BLOOD PRESSURE: 70 MMHG | TEMPERATURE: 96.8 F | WEIGHT: 187.5 LBS | RESPIRATION RATE: 16 BRPM | HEART RATE: 57 BPM | HEIGHT: 66 IN

## 2021-01-07 DIAGNOSIS — Z01.419 WELL WOMAN EXAM WITH ROUTINE GYNECOLOGICAL EXAM: Primary | ICD-10-CM

## 2021-01-07 DIAGNOSIS — N81.11 MIDLINE CYSTOCELE: ICD-10-CM

## 2021-01-07 DIAGNOSIS — N81.10 VAGINAL PROLAPSE: ICD-10-CM

## 2021-01-07 PROCEDURE — 99396 PREV VISIT EST AGE 40-64: CPT | Performed by: NURSE PRACTITIONER

## 2021-01-07 NOTE — PROGRESS NOTES
GYN ONCOLOGY ANNUAL WELL WOMAN VISIT      Mercedes Dale  [id number]  1960      Subjective   Chief Complaint: Annual Exam (ongoing prolapse symptoms)        History of present illness:      Mercedes Dale is a 60 y.o. year old female who is here today for an annual exam. She has a history of rectocele and is status post hysterectomy with repair, pathology benign.  She has been on HRT since that time with Estrace 0.5 mg p.o. daily. Over the last year she has had onset of recurrent pelvic pressure, found to have midline cystocele grade 2-3 and prolapse of the vaginal apex at last visit in 2020. At that time she chose to pursue conservative management with pelvic floor PT. She felt this was going very well and managing symptoms for several months. Then, she had to reschedule several therapy visits over the holidays and feels symptoms may be worsening again. Symptoms continue to be most bothersome with carrying groceries, lifting a grandchild, and being on feet extended periods of time. She plans to continue PT, but would like to discuss other options.     Otherwise, she is feeling generally well today. She denies vaginal bleeding, concerning lesions, breast problems, or changes in bladder function. Colonoscopy is currently UTD. Mammogram overdue, delayed this year due to COVID-19 pandemic, pt plans to call and schedule for this year.       Obstetric History:  OB History        3    Para   2    Term   2            AB   1    Living           SAB   1    TAB        Ectopic        Molar        Multiple        Live Births                   Menstrual History:     No LMP recorded. Patient has had a hysterectomy.          The current medication list and allergy list were reviewed and reconciled.     Past Medical History, Past Surgical History, Social History, Family History have been reviewed and are without significant changes except as mentioned.    Health Maintenance:  Last mammogram was 2019,  "BiRADS 2. Last colonoscopy was 2017, with recommended follow-up in 5-10 year(s). Last pap smear was 1/6/2020, results were  normal PAP..        Review of Systems   Constitutional: Negative.    Gastrointestinal: Negative.    Genitourinary: Positive for pelvic pain (pressure r/t known prolapse). Negative for difficulty urinating, dyspareunia, dysuria, enuresis and vaginal bleeding.   Skin:        Breast ROS negative       Objective   Physical Exam  Vital Signs: /70   Pulse 57   Temp 96.8 °F (36 °C) (Temporal)   Resp 16   Ht 167.6 cm (65.98\")   Wt 85 kg (187 lb 8 oz)   SpO2 97%   BMI 30.28 kg/m²   Vitals:    01/07/21 1440   PainSc: 0-No pain           General Appearance:  alert, cooperative, no apparent distress and appears stated age   Neurologic/Psychiatric: A&O x 3, gait steady, appropriate affect   Lungs:   Clear to auscultation bilaterally; respirations regular, even, and unlabored bilaterally   Heart:  Regular rate and rhythm, no murmurs appreciated   Breasts:  Symmetrical, no masses, no lesions and no nipple discharge   Abdomen:   Soft, non-tender, non-distended and no organomegaly   Lymph nodes: No cervical, supraclavicular, inguinal or axillary adenopathy noted   Extremities: Normal, atraumatic; no clubbing, cyanosis, or edema    Skin: No rashes, ulcers, or suspicious lesions noted   Pelvic: External Genitalia  without lesions or skin changes, stable left vulvar varicosity, unchanged x years  Vagina  is pink, moist, without lesions.  and reveals significant prolapse.Grade 2-3 cystocele and prolapse of vaginal apex noted with valsalva.    Vaginal Cuff  Female Vaginal Cuff: smooth, intact, without visible lesions and descends with valsalva  Uterus  surgically absent and no palpable masses  Ovaries  surgically absent bilaterally  Parametria  smooth  Rectovaginal  Female rectovaginal: confirms no masses or bleeding and Hemoccult negative       PHQ-9 Total Score: 0    Procedure Note:  No notes on " file       Assessment and Plan:      Diagnoses and all orders for this visit:    1. Well woman exam with routine gynecological exam (Primary)  -     IGP, Rfx Aptima HPV ASCU; Future    2. Midline cystocele  -     Ambulatory Referral to Gynecologic Urology    3. Vaginal prolapse  -     Ambulatory Referral to Gynecologic Urology        Pap was done today. If she does not receive the results of the Pap within 2 weeks  time, she was instructed to call to find out the results.      She was encouraged to get yearly mammograms.  She should report any palpable breast lump(s) or skin changes regardless of mammographic findings.  I explained to Mercedes that notification regarding her mammogram results will come from the center performing the study.  Our office will not be routinely calling with mammogram results.  It is her responsibility to make sure that the results from the mammogram are communicated to her by the breast center.  If she has any questions about the results, she is welcome to call our office anytime.  Pt to call and schedule soon.     Repeat coloscopy in [id number].    Patient and I again discussed her prolapse. I am glad pelvic floor PT has been helpful. Patient interested in discussing alternate options in case this does not help symptoms long-term. Discussed options such as pessary management or surgical intervention. Patient is interested in consultation to consider these in the future. Referral to Urogynecology at  ordered today.     Pain assessment was performed today as a part of patient’s care. For patients with pain related to surgery, gynecologic malignancy or cancer treatment, the plan is as noted in the assessment/plan.  For patients with pain not related to these issues, they are to seek any further needed care from a more appropriate provider, such as PCP.      Follow-up:     Return to clinic PRN, may return to general gynecology due to benign history.       Electronically signed by Estefanía  DARIO Mariano on 01/08/21 at 09:11 EST

## 2021-02-10 RX ORDER — APIXABAN 5 MG/1
TABLET, FILM COATED ORAL
Qty: 60 TABLET | Refills: 11 | Status: SHIPPED | OUTPATIENT
Start: 2021-02-10 | End: 2022-03-07

## 2021-02-22 DIAGNOSIS — Z23 IMMUNIZATION DUE: ICD-10-CM

## 2021-03-31 RX ORDER — FLECAINIDE ACETATE 50 MG/1
TABLET ORAL
Qty: 180 TABLET | Refills: 0 | Status: SHIPPED | OUTPATIENT
Start: 2021-03-31 | End: 2021-07-28 | Stop reason: SDUPTHER

## 2021-04-16 ENCOUNTER — TREATMENT (OUTPATIENT)
Dept: PHYSICAL THERAPY | Facility: CLINIC | Age: 61
End: 2021-04-16

## 2021-04-16 DIAGNOSIS — M62.89 PELVIC FLOOR DYSFUNCTION IN FEMALE: ICD-10-CM

## 2021-04-16 DIAGNOSIS — N81.11 MIDLINE CYSTOCELE: Primary | ICD-10-CM

## 2021-04-16 DIAGNOSIS — R32 URINARY INCONTINENCE IN FEMALE: ICD-10-CM

## 2021-04-16 DIAGNOSIS — M62.81 MUSCLE WEAKNESS: ICD-10-CM

## 2021-04-16 PROCEDURE — 97162 PT EVAL MOD COMPLEX 30 MIN: CPT | Performed by: PHYSICAL THERAPIST

## 2021-04-16 NOTE — PROGRESS NOTES
Physical Therapy Initial Evaluation and Plan of Care    Patient: Mercedes Dale   : 1960  Diagnosis/ICD-10 Code:  Midline cystocele [N81.11]  Referring practitioner: JAMES Tolentino  Date of Initial Visit: 2021  Today's Date: 2021  Patient seen for 1 sessions      Subjective    Pt stopped attending pelvic PT previously because she was scared due to COVID but got her 2nd vaccine 2 weeks ago. OBGYN Referred to Dr. Varghese about 2 weeks ago.Hesitant about pessary doesn't feel confident could do it/use it. Has been doing some exercises when squeegeeing her shower. For a while during her coughing and COVID did not do her exercises.          Chief Complaint:   Chief Complaint   Patient presents with   • Initial Evaluation      Functional Outcome Measure: PFDI 20: 201.1    Pain  Feels less pain but can lift a load of laundry, carrying in groceries, pull weeds and feels its dropped. Sitting on office chair and island stool can feel that pressure bulging after eating a meal.     Average:5 Best: Pelvic #: 0 Worst: Pelvic #: 7      Bladder function:    Incontinence: leaking and dribbling more, leaking about EOD  # of pads in 24 hours: Not wearing pad - not going to exercise class   What specifically makes you leak: walking and moving around  Leak at night: no  Urination at night: 0-1x  Strong urinary urge: no  Leaking with urge: no  Urge triggers: n/a  Complete bladder voiding %: had UDS and had 40% left  Post-micturition dribble: not sure  Frequency of urination: every 2-3 hours  Discomfort with urination: no  Vaginal or anal itching: no    Bowel function:  Taking Miralax and bowels better; doing Mediterranean diet; splinting occassionally  How many BM's/day: daily to 2x  [place] Stool Scale Type: 3-4  Discomfort with BM: no  Complete bowel voiding: feels empty  Diet: Mediterranean diet      Sexual activity  Denies issues    Prior PT or other treatment: prior pelvic PT with poor  attendance    Diagnostics:    PMH:   Past Medical History:   Diagnosis Date   • Abnormal heart rhythm    • Anxiety 9/1/2016   • Anxiety    • Constipation 9/1/2016   • Decreased libido    • History of echocardiogram    • History of Holter monitoring    • History of stress test    • Hot flashes 9/1/2016   • Hyperlipidemia    • Hypertension 9/1/2016   • Insomnia 9/1/2016   • Mitral valve prolapse    • GEORGE (stress urinary incontinence, female) 9/1/2016        Surgical HX:   Past Surgical History:   Procedure Laterality Date   • GALLBLADDER SURGERY  2012   • KNEE ARTHROSCOPY      scope twice   • LAPAROSCOPIC ASSISTED VAGINAL HYSTERECTOMY SALPINGO OOPHORECTOMY      51        Meds:   Current Outpatient Medications:   •  ALPRAZolam (XANAX) 1 MG tablet, 1 mg As Needed. 1/2 TABLET AS NEEDED, Disp: , Rfl:   •  atorvastatin (LIPITOR) 10 MG tablet, 10 mg Daily., Disp: , Rfl:   •  CARTIA  MG 24 hr capsule, 240 mg Daily., Disp: , Rfl:   •  Eliquis 5 MG tablet tablet, TAKE ONE TABLET BY MOUTH EVERY 12 HOURS, Disp: 60 tablet, Rfl: 11  •  estradiol (ESTRACE) 0.5 MG tablet, TAKE ONE TABLET BY MOUTH DAILY, Disp: 30 tablet, Rfl: 11  •  flecainide (TAMBOCOR) 50 MG tablet, TAKE ONE TABLET BY MOUTH TWICE A DAY, Disp: 180 tablet, Rfl: 0  •  Multiple Vitamins-Minerals (THRIVE FOR LIFE WOMENS) tablet, Take  by mouth Daily., Disp: , Rfl:   •  pantoprazole (PROTONIX) 40 MG EC tablet, 40 mg Daily., Disp: , Rfl:   •  polyethylene glycol (MIRALAX) packet, Take 17 g by mouth As Needed., Disp: , Rfl:   •  Scopolamine (TRANSDERM-SCOP) 1.5 MG/3DAYS patch, Place 1 patch on the skin as directed by provider As Needed. WHEN TRAVELING ONLY, Disp: , Rfl:   •  zolpidem (AMBIEN) 10 MG tablet, 5 mg At Night As Needed. 1/2 TABLET, Disp: , Rfl:                Objective    Patient gave consent for internal pelvic floor examination and treatment.      Palpation:   Supine:   Abdominals, Iliacus, psoas B - unremarkable  Adductors: unremarkable     External:                Unremarkable for tension B  Sensation intact  Noted vulvar varicosity on L     Internal:   Sensation: intact B  Bulge: complete  Knack: absent  Wall Laxity: moderate anterior and mild posterior laxity                 L/R supf mm: unremarkable for tension B              Internal supf perineal body:unremarkable for tension B              Levator ani: unremarkable for tension B              Obturator internus: unremarkable for tension B              Compressor urethra: unremarkable for tension B     PERF SCALE:  Power: 3-     Endurance: 8     Repetitions: 2     Fast twitch: 5        Assessment/Plan:      The patient is a 60 y.o. female who presents to physical therapy today for pelvic organ prolapse, vaginal pressure/heaviness, stress urinary incontinence. Upon initial evaluation, the patient demonstrates the following impairments: decreased strength and endurance of pelvic floor muscles. Due to these impairments, the patient is unable to garden, lift grandchild,  groceries or exercise without vaginal pressure/discomfort. The patient would benefit from skilled pelvic PT services to address functional limitations and impairments and to improve patient quality of life.       Goals:   STG's: 4 weeks  · Patient will report > 25% reduction in need to splint with BM  · Patient will report understanding of prolapse precautions   · Patient will report no more than 2/10 discomfort with vaginal pressure x 1 week for improved function with daily activities  · Patient will be able to perform HEP with minimal verbal cues     LTG's: By discharge  · Patient will report a decrease in pain to < 1/10 with bending, lifting for improved function with gardening  · Patient will report >85% improvement in vaginal pressure symptoms   · Patient will report no stress incontinence x 2 weeks for improved QOL  · Patient will be independent with HEP        Plan  Therapy options: will be seen for skilled physical therapy  services  Planned modality interventions: TENS, ultrasound, cryotherapy, thermotherapy (hydrocollator packs) and high voltage pulsed current (pain management)  Planned therapy interventions: abdominal trunk stabilization, manual therapy, neuromuscular re-education, body mechanics training, flexibility, functional ROM exercises, gait training, home exercise program, joint mobilization, therapeutic activities, stretching, strengthening, spinal/joint mobilization, soft tissue mobilization and postural training  Pt prognosis: fair   Frequency: appropriate for weekly - pt requests to come every 2-4 weeks due to travel for appointments  Duration in visits: 8  Duration in weeks: 16  Treatment plan discussed with: patient     1300/1335  Timed:         Manual Therapy:    0     mins  99833;     Therapeutic Exercise:    0     mins  93916;     Neuromuscular Erendira:    0    mins  39213;    Therapeutic Activity:     0     mins  48012;     Gait Trainin     mins  44836;     Ultrasound:     0     mins  83243;    Ionto                               0    mins   81660  Self Care                       0     mins   33038  Canalith Repos    0     mins 37803      Un-Timed:  Electrical Stimulation:    00     mins  22607 ( );  Dry Needling     0     mins self-pay  Traction     0     mins 89396  Low Eval     0     Mins  02722  Mod Eval     35     Mins  83926  High Eval                       0     Mins  29216  Re-Eval                           0    mins  69666        Timed Treatment:   0   mins   Total Treatment:     35   mins    PT SIGNATURE:Noah Vidal PT, DPT  DATE TREATMENT INITIATED: 2021    Initial Certification  Certification Period: 2021  I certify that the therapy services are furnished while this patient is under my care.  The services outlined above are required by this patient, and will be reviewed every 90 days.     PHYSICIAN: Estefanía Hernandez, APRN      DATE: 2021     Please sign and return  via fax to [phone]. Thank you, [facility].

## 2021-05-25 ENCOUNTER — TREATMENT (OUTPATIENT)
Dept: PHYSICAL THERAPY | Facility: CLINIC | Age: 61
End: 2021-05-25

## 2021-05-25 DIAGNOSIS — R10.2 PELVIC PAIN: ICD-10-CM

## 2021-05-25 DIAGNOSIS — M62.89 PELVIC FLOOR DYSFUNCTION IN FEMALE: ICD-10-CM

## 2021-05-25 DIAGNOSIS — R32 URINARY INCONTINENCE IN FEMALE: ICD-10-CM

## 2021-05-25 DIAGNOSIS — M62.81 MUSCLE WEAKNESS: ICD-10-CM

## 2021-05-25 DIAGNOSIS — N81.10 VAGINAL PROLAPSE: ICD-10-CM

## 2021-05-25 DIAGNOSIS — N81.11 MIDLINE CYSTOCELE: Primary | ICD-10-CM

## 2021-05-25 DIAGNOSIS — N81.4 CYSTOCELE WITH PROLAPSE: ICD-10-CM

## 2021-05-25 PROCEDURE — 97112 NEUROMUSCULAR REEDUCATION: CPT | Performed by: PHYSICAL THERAPIST

## 2021-05-25 PROCEDURE — 97110 THERAPEUTIC EXERCISES: CPT | Performed by: PHYSICAL THERAPIST

## 2021-05-25 NOTE — PROGRESS NOTES
Re-Assessment / Re-Certification        Patient: Mercedes Dale   : 1960  Diagnosis/ICD-10 Code:  Midline cystocele [N81.11]  Referring practitioner: JAMES Tolentino  Date of Initial Visit: Type: THERAPY  Noted: 2021  Today's Date: 2021  Patient seen for 2 sessions      Subjective:   Mercedes Dale reports: thinks things are going better. Not feeling like she has testicles as often. Sometimes feel a little soreness. Feeling like everything is dropping not as frequent at all. Improvement 40%. Continues to splint to have a bowel movement but has changed diet and has softer stools and not having to splint at often. Doing Mediterranean. 3/10 discomfort and planting like crazy in garden. Tugging and pulling a lot.     Subjective Questionnaire: PFDI 20  Clinical Progress: improved  Home Program Compliance: Yes  Treatment has included: therapeutic exercise, neuromuscular re-education, manual therapy and therapeutic activity    Patient gave consent for internal pelvic floor examination and treatment.      Palpation:   Supine:   Abdominals, Iliacus, psoas B - unremarkable  Adductors: unremarkable     External:               Unremarkable for tension B  Sensation intact  Noted vulvar varicosity on L     Internal:   Sensation: intact B  Bulge: complete  Knack: absent  Wall Laxity: moderate anterior and mild posterior laxity                 L/R supf mm: unremarkable for tension B              Internal supf perineal body:unremarkable for tension B              Levator ani: unremarkable for tension B              Obturator internus: unremarkable for tension B              Compressor urethra: unremarkable for tension B     PERF SCALE:  Power: 3+     Endurance: 10     Repetitions: 5     Fast twitch: 6        Assessment/Plan:      Pt has been compliant with HEP and this was advanced/modified today to further improve strength and endurance of pelvic floor mm. She demonstrates improved strength and endurance  with internal exam today. Pt has had significant improvement in symptoms allowing for decreased discomfort with walking/jogging and gardening. Will continue to progress as indicated to further reduce prolapse symptoms. She has met 4/4 short term goals at this time.       Goals:   STG's: 4 weeks  · Patient will report > 25% reduction in need to splint with BM - met  · Patient will report understanding of prolapse precautions - met  · Patient will report no more than 2/10 discomfort with vaginal pressure x 1 week for improved function with daily activities - met when not gardening  Patient will be able to perform HEP with minimal verbal cues met     LTG's: By discharge  · Patient will report a decrease in pain to < 1/10 with bending, lifting for improved function with gardening  · Patient will report >85% improvement in vaginal pressure symptoms   · Patient will report no stress incontinence x 2 weeks for improved QOL  · Patient will be independent with HEP            Recommendations: Continue as planned  Timeframe: 2 months  Prognosis to achieve goals: good    PT Signature: Noah Vidal, PT      Based upon review of the patient's progress and continued therapy plan, it is my medical opinion that Mercedes Dale should continue physical therapy treatment at [facility]  [address] [place] [id number] [phone].    Signature: __________________________________  Estefanía Hernandez, APRN      1515/1600  Manual Therapy:    0     mins  08684;  Therapeutic Exercise:    35     mins  28737;     Neuromuscular Erendira:    8    mins  29279;    Therapeutic Activity:     0     mins  76329;     Gait Trainin     mins  94734;     Ultrasound:     0     mins  31258;    Electrical Stimulation:    0     mins  02799 ( );  Dry Needling     0     mins self-pay    Timed Treatment:   43   mins   Total Treatment:     43   mins

## 2021-06-01 RX ORDER — ESTRADIOL 0.5 MG/1
TABLET ORAL
Qty: 30 TABLET | Refills: 11 | Status: SHIPPED | OUTPATIENT
Start: 2021-06-01 | End: 2022-06-01 | Stop reason: SDUPTHER

## 2021-06-21 ENCOUNTER — TREATMENT (OUTPATIENT)
Dept: PHYSICAL THERAPY | Facility: CLINIC | Age: 61
End: 2021-06-21

## 2021-06-21 ENCOUNTER — TELEPHONE (OUTPATIENT)
Dept: GYNECOLOGIC ONCOLOGY | Facility: CLINIC | Age: 61
End: 2021-06-21

## 2021-06-21 DIAGNOSIS — M62.89 PELVIC FLOOR DYSFUNCTION IN FEMALE: ICD-10-CM

## 2021-06-21 DIAGNOSIS — R32 URINARY INCONTINENCE IN FEMALE: ICD-10-CM

## 2021-06-21 DIAGNOSIS — M62.81 MUSCLE WEAKNESS: ICD-10-CM

## 2021-06-21 DIAGNOSIS — N81.4 CYSTOCELE WITH PROLAPSE: ICD-10-CM

## 2021-06-21 DIAGNOSIS — R10.2 PELVIC PAIN: ICD-10-CM

## 2021-06-21 DIAGNOSIS — N81.11 MIDLINE CYSTOCELE: Primary | ICD-10-CM

## 2021-06-21 DIAGNOSIS — N81.10 VAGINAL PROLAPSE: ICD-10-CM

## 2021-06-21 PROCEDURE — 97530 THERAPEUTIC ACTIVITIES: CPT | Performed by: PHYSICAL THERAPIST

## 2021-06-21 PROCEDURE — 97110 THERAPEUTIC EXERCISES: CPT | Performed by: PHYSICAL THERAPIST

## 2021-06-21 PROCEDURE — 97112 NEUROMUSCULAR REEDUCATION: CPT | Performed by: PHYSICAL THERAPIST

## 2021-06-21 NOTE — TELEPHONE ENCOUNTER
Caller: BRIGITTE     Relationship: BRIGITTE WITH [facility]    Best call back number: [phone]    What is the best time to reach you: UNTIL 5:00 TODAY    Who are you requesting to speak with (clinical staff, provider,  specific staff member): NURSE    What was the call regarding: PATIENT IS RECEIVING TREATMENT BUT NEEDS A NEW ORDER FOR CONTINUED PT PELVIC TREATMENTS.  PER BRIGITTE, JUST PUT ORDER IN Epic.    Do you require a callback: YES-TO LET HER KNOW THAT ORDER HAS BEEN DONE SO SHE CAN LOOK FOR IT.    THANKS

## 2021-06-21 NOTE — PROGRESS NOTES
Physical Therapy Daily Progress Note  Patient: Mercedes Dale   : 1960  Diagnosis/ICD-10 Code:  Midline cystocele [N81.11]  Referring practitioner: JAMES Tolentino  Date of Initial Visit: Type: THERAPY  Noted: 2021  Today's Date: 2021  Patient seen for 3 sessions      Subjective   Mercedes Dale reports feels things going well able to do exercise and that's been good. Doing HEP daily.   Not having pressure discomfort like having testicles. Has done a few things that have gone against with lifting a lot from Thursday to yesterday. Tried to engage pelvic floor and not hold breath.   Did one HIIT class and had more pressure and was disappointed.     Pain Rating (0-10): 0      Objective   verbal consent obtained for internal pelvic exam/treatment with declined need for second person in room     4/10/7/6    See Exercise, Manual, and Modality Logs for complete treatment.     Patient Education: vaginal weights    Assessment/Plan  Pt compliant with HEP. Overall significant improvement in pressure but increased with attempts at return to HIIT exercise. Pt would likely benefit from the addition of pelvic weights to strengthening routine and was educated on this today. She is to continue current HEP daily and purchase weights to be used daily as well. Will see Pt in one month for follow up and likely discharge at that time.     Progress per Plan of Care         1230/1305   Timed:         Manual Therapy:    0     mins  00944;     Therapeutic Exercise:    16     mins  88978;     Neuromuscular Erendira:    8    mins  57109;    Therapeutic Activity:     8     mins  34474;     Gait Trainin     mins  14170;     Ultrasound:     0     mins  25465;    Ionto                               0    mins   94006  Self Care                       0     mins   38716  Canalith Repos               0    mins  51110    Un-Timed:  Electrical Stimulation:    0     mins  09061 ( );  Dry Needling     0     mins  self-pay  Traction     0     mins 74003  Low Eval     0     Mins  05065  Mod Eval     0     Mins  18582  High Eval                       0     Mins  98659  Re-Eval                           0    mins  55031    Timed Treatment:   32   mins   Total Treatment:     32   mins    Noah Vidal PT  [place] License # [id number]  Physical Therapist

## 2021-06-25 ENCOUNTER — OFFICE VISIT (OUTPATIENT)
Dept: FAMILY MEDICINE CLINIC | Facility: CLINIC | Age: 61
End: 2021-06-25

## 2021-06-25 VITALS
WEIGHT: 184.2 LBS | HEART RATE: 70 BPM | SYSTOLIC BLOOD PRESSURE: 128 MMHG | DIASTOLIC BLOOD PRESSURE: 78 MMHG | BODY MASS INDEX: 29.6 KG/M2 | OXYGEN SATURATION: 98 % | HEIGHT: 66 IN | TEMPERATURE: 97.1 F

## 2021-06-25 DIAGNOSIS — E78.49 OTHER HYPERLIPIDEMIA: Primary | ICD-10-CM

## 2021-06-25 PROCEDURE — 99204 OFFICE O/P NEW MOD 45 MIN: CPT | Performed by: FAMILY MEDICINE

## 2021-06-25 PROCEDURE — 84443 ASSAY THYROID STIM HORMONE: CPT | Performed by: FAMILY MEDICINE

## 2021-06-25 PROCEDURE — 80053 COMPREHEN METABOLIC PANEL: CPT | Performed by: FAMILY MEDICINE

## 2021-06-25 PROCEDURE — 36415 COLL VENOUS BLD VENIPUNCTURE: CPT | Performed by: FAMILY MEDICINE

## 2021-06-25 PROCEDURE — 85025 COMPLETE CBC W/AUTO DIFF WBC: CPT | Performed by: FAMILY MEDICINE

## 2021-06-25 PROCEDURE — 82607 VITAMIN B-12: CPT | Performed by: FAMILY MEDICINE

## 2021-06-25 PROCEDURE — 80061 LIPID PANEL: CPT | Performed by: FAMILY MEDICINE

## 2021-06-25 PROCEDURE — 82306 VITAMIN D 25 HYDROXY: CPT | Performed by: FAMILY MEDICINE

## 2021-06-25 RX ORDER — CLOTRIMAZOLE AND BETAMETHASONE DIPROPIONATE 10; .64 MG/G; MG/G
1 CREAM TOPICAL 2 TIMES DAILY
COMMUNITY
End: 2021-08-06

## 2021-06-25 RX ORDER — TRIAMCINOLONE ACETONIDE 1 MG/G
1 CREAM TOPICAL 2 TIMES DAILY
COMMUNITY
End: 2021-11-08 | Stop reason: SDUPTHER

## 2021-06-26 LAB
25(OH)D3 SERPL-MCNC: 22.6 NG/ML (ref 30–100)
ALBUMIN SERPL-MCNC: 4.2 G/DL (ref 3.5–5.2)
ALBUMIN/GLOB SERPL: 1.4 G/DL
ALP SERPL-CCNC: 62 U/L (ref 39–117)
ALT SERPL W P-5'-P-CCNC: 27 U/L (ref 1–33)
ANION GAP SERPL CALCULATED.3IONS-SCNC: 13 MMOL/L (ref 5–15)
AST SERPL-CCNC: 23 U/L (ref 1–32)
BASOPHILS # BLD AUTO: 0.05 10*3/MM3 (ref 0–0.2)
BASOPHILS NFR BLD AUTO: 0.5 % (ref 0–1.5)
BILIRUB SERPL-MCNC: 0.3 MG/DL (ref 0–1.2)
BUN SERPL-MCNC: 23 MG/DL (ref 8–23)
BUN/CREAT SERPL: 20.9 (ref 7–25)
CALCIUM SPEC-SCNC: 8.7 MG/DL (ref 8.6–10.5)
CHLORIDE SERPL-SCNC: 105 MMOL/L (ref 98–107)
CHOLEST SERPL-MCNC: 155 MG/DL (ref 0–200)
CO2 SERPL-SCNC: 23 MMOL/L (ref 22–29)
CREAT SERPL-MCNC: 1.1 MG/DL (ref 0.57–1)
DEPRECATED RDW RBC AUTO: 47.1 FL (ref 37–54)
EOSINOPHIL # BLD AUTO: 0.38 10*3/MM3 (ref 0–0.4)
EOSINOPHIL NFR BLD AUTO: 4.1 % (ref 0.3–6.2)
ERYTHROCYTE [DISTWIDTH] IN BLOOD BY AUTOMATED COUNT: 13.3 % (ref 12.3–15.4)
GFR SERPL CREATININE-BSD FRML MDRD: 51 ML/MIN/1.73
GLOBULIN UR ELPH-MCNC: 2.9 GM/DL
GLUCOSE SERPL-MCNC: 94 MG/DL (ref 65–99)
HCT VFR BLD AUTO: 40.2 % (ref 34–46.6)
HDLC SERPL-MCNC: 29 MG/DL (ref 40–60)
HGB BLD-MCNC: 13.4 G/DL (ref 12–15.9)
LDLC SERPL CALC-MCNC: 67 MG/DL (ref 0–100)
LDLC/HDLC SERPL: 1.73 {RATIO}
LYMPHOCYTES # BLD AUTO: 2.47 10*3/MM3 (ref 0.7–3.1)
LYMPHOCYTES NFR BLD AUTO: 26.4 % (ref 19.6–45.3)
MCH RBC QN AUTO: 31.8 PG (ref 26.6–33)
MCHC RBC AUTO-ENTMCNC: 33.3 G/DL (ref 31.5–35.7)
MCV RBC AUTO: 95.5 FL (ref 79–97)
MONOCYTES # BLD AUTO: 0.55 10*3/MM3 (ref 0.1–0.9)
MONOCYTES NFR BLD AUTO: 5.9 % (ref 5–12)
NEUTROPHILS NFR BLD AUTO: 5.89 10*3/MM3 (ref 1.7–7)
NEUTROPHILS NFR BLD AUTO: 62.9 % (ref 42.7–76)
PLATELET # BLD AUTO: 227 10*3/MM3 (ref 140–450)
PMV BLD AUTO: 13.2 FL (ref 6–12)
POTASSIUM SERPL-SCNC: 4 MMOL/L (ref 3.5–5.2)
PROT SERPL-MCNC: 7.1 G/DL (ref 6–8.5)
RBC # BLD AUTO: 4.21 10*6/MM3 (ref 3.77–5.28)
SODIUM SERPL-SCNC: 141 MMOL/L (ref 136–145)
TRIGL SERPL-MCNC: 379 MG/DL (ref 0–150)
TSH SERPL DL<=0.05 MIU/L-ACNC: 2.62 UIU/ML (ref 0.27–4.2)
VIT B12 BLD-MCNC: 1109 PG/ML (ref 211–946)
VLDLC SERPL-MCNC: 59 MG/DL (ref 5–40)
WBC # BLD AUTO: 9.36 10*3/MM3 (ref 3.4–10.8)

## 2021-06-30 ENCOUNTER — OFFICE VISIT (OUTPATIENT)
Dept: CARDIOLOGY | Facility: CLINIC | Age: 61
End: 2021-06-30

## 2021-06-30 VITALS
DIASTOLIC BLOOD PRESSURE: 82 MMHG | SYSTOLIC BLOOD PRESSURE: 126 MMHG | BODY MASS INDEX: 29.41 KG/M2 | HEIGHT: 66 IN | OXYGEN SATURATION: 99 % | WEIGHT: 183 LBS | HEART RATE: 50 BPM

## 2021-06-30 DIAGNOSIS — E78.2 MIXED HYPERLIPIDEMIA: ICD-10-CM

## 2021-06-30 DIAGNOSIS — R00.2 PALPITATIONS: ICD-10-CM

## 2021-06-30 DIAGNOSIS — I20.9 ANGINA PECTORIS (HCC): Primary | ICD-10-CM

## 2021-06-30 DIAGNOSIS — I10 ESSENTIAL HYPERTENSION: ICD-10-CM

## 2021-06-30 PROCEDURE — 93000 ELECTROCARDIOGRAM COMPLETE: CPT | Performed by: INTERNAL MEDICINE

## 2021-06-30 PROCEDURE — 99214 OFFICE O/P EST MOD 30 MIN: CPT | Performed by: INTERNAL MEDICINE

## 2021-06-30 NOTE — PROGRESS NOTES
[facility] Cardiology  Office Progress Note  Mercedes Dale  1960  [address] [place] [address] [place] [address]       Visit Date: 06/30/21    PCP: Jessica Graham MD  [address] [place] [address]    IDENTIFICATION: A 60 y.o. 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    PROBLEM LIST:   1. Palpitations  1. 9/5/18 echo: EF 60% mild AI  2. 9/18 Event monitor - short lived afib  3. MPS ordered: pending  2. HL  1. 9/10/18   HDL 40 LDL 88, on atorvastatin  3. Gerd    CC:   Chief Complaint   Patient presents with   • Unstable Angina Pectoris   • Paroxysmal Atrial Fibrillation   • Palpitations       Allergies  Allergies   Allergen Reactions   • Codeine Nausea And Vomiting   • Hydrocodone Nausea And Vomiting   • Morphine Nausea And Vomiting   • Adhesive Tape Itching       Current Medications    Current Outpatient Medications:   •  ALPRAZolam (XANAX) 1 MG tablet, 1 mg As Needed. 1/2 TABLET AS NEEDED, Disp: , Rfl:   •  atorvastatin (LIPITOR) 10 MG tablet, 10 mg Daily., Disp: , Rfl:   •  CARTIA  MG 24 hr capsule, 240 mg Daily., Disp: , Rfl:   •  clotrimazole-betamethasone (LOTRISONE) 1-0.05 % cream, Apply 1 application topically to the appropriate area as directed 2 (Two) Times a Day., Disp: , Rfl:   •  Eliquis 5 MG tablet tablet, TAKE ONE TABLET BY MOUTH EVERY 12 HOURS, Disp: 60 tablet, Rfl: 11  •  estradiol (ESTRACE) 0.5 MG tablet, TAKE ONE TABLET BY MOUTH DAILY, Disp: 30 tablet, Rfl: 11  •  flecainide (TAMBOCOR) 50 MG tablet, TAKE ONE TABLET BY MOUTH TWICE A DAY, Disp: 180 tablet, Rfl: 0  •  Multiple Vitamins-Minerals (THRIVE FOR LIFE WOMENS) tablet, Take  by mouth Daily., Disp: , Rfl:   •  pantoprazole (PROTONIX) 40 MG EC tablet, 40 mg Daily., Disp: , Rfl:   •  polyethylene glycol (MIRALAX) packet, Take 17 g by mouth As Needed., Disp: , Rfl:   •  Scopolamine (TRANSDERM-SCOP) 1.5 MG/3DAYS patch, Place 1 patch on the skin as directed by provider As Needed. WHEN TRAVELING ONLY,  "Disp: , Rfl:   •  triamcinolone (KENALOG) 0.1 % cream, Apply 1 application topically to the appropriate area as directed 2 (Two) Times a Day., Disp: , Rfl:   •  zolpidem (AMBIEN) 10 MG tablet, 5 mg At Night As Needed. 1/2 TABLET, Disp: , Rfl:       History of Present Illness   Mercedes Dale is a 60 y.o. year old female here for follow up.    Had significant emotional stress of the loss of her primary care provider there was also a personal friend.  Also younger family members passed over the last 1 year with medical issues.  She does note one episode of substernal chest discomfort while she was gardening.  She states that it resolved without presenting the hospital.  She notes no palpitations or presyncope otherwise      OBJECTIVE:  Vitals:    06/30/21 1155   BP: 126/82   BP Location: Right arm   Patient Position: Sitting   Pulse: 50   SpO2: 99%   Weight: 83 kg (183 lb)   Height: 167.6 cm (66\")     Body mass index is 29.54 kg/m².    Constitutional:       Appearance: Healthy appearance. Not in distress.   Neck:      Vascular: No JVR. JVD normal.   Pulmonary:      Effort: Pulmonary effort is normal.      Breath sounds: Normal breath sounds. No wheezing. No rhonchi. No rales.   Chest:      Chest wall: Not tender to palpatation.   Cardiovascular:      PMI at left midclavicular line. Normal rate. Regular rhythm. Normal S1. Normal S2.      Murmurs: There is no murmur.      No gallop. No click. No rub.   Pulses:     Intact distal pulses.   Edema:     Peripheral edema absent.   Abdominal:      General: Bowel sounds are normal.      Palpations: Abdomen is soft.      Tenderness: There is no abdominal tenderness.   Musculoskeletal: Normal range of motion.         General: No tenderness. Skin:     General: Skin is warm and dry.   Neurological:      General: No focal deficit present.      Mental Status: Alert and oriented to person, place and time.         Diagnostic Data:    ECG 12 Lead    Date/Time: 6/30/2021 12:58 " PM  Performed by: Jon Ramirez MD  Authorized by: Jon Ramirez MD   Previous ECG: no previous ECG available  Rhythm: sinus bradycardia  BPM: 50    Clinical impression: non-specific ECG              ASSESSMENT:   Diagnosis Plan   1. Angina pectoris (CMS/HCC)     2. Essential hypertension     3. Mixed hyperlipidemia     4. Palpitations         PLAN:    Anginal equivalent or stratification with Cardiolite stress test    Hypertension controlled Cartia    Dyslipidemia with elevated triglyceride per most recent evaluation, delineated need for lower carbohydrate intake she states she is utilizing Mediterranean diet plan at current    Palpitations suppressed with diltiazem flecainide without recurrent symptoms.        Jon Ramirez MD, FACC

## 2021-07-16 ENCOUNTER — TELEPHONE (OUTPATIENT)
Dept: PHYSICAL THERAPY | Facility: CLINIC | Age: 61
End: 2021-07-16

## 2021-07-16 NOTE — TELEPHONE ENCOUNTER
PATIENT HAD TO RESCHEDULE HER 7/26 APPT.  NEXT AVAILABLE WAS 9/2.  PATIENT STATES SHE IS NORMALLY SEEN ABOUT EVERY FOUR WEEKS.  IF THERE IS A CANCELLATION IN AUGUST SHE WOULD LIKE TO COME IN.

## 2021-07-19 ENCOUNTER — TELEPHONE (OUTPATIENT)
Dept: FAMILY MEDICINE CLINIC | Facility: CLINIC | Age: 61
End: 2021-07-19

## 2021-07-19 NOTE — TELEPHONE ENCOUNTER
PATIENT SAID THAT SHE HAS A BRUISE ON HER LOWER RIGHT SIDE AND SHE WOULD LIKE A CALLBACK TO TALK TO SOMEONE ABOUT WHAT SHE NEEDS TO DO ABOUT IT AND TREAT IT.    CONTACT: [phone]      Spoke with patient she has a hematoma/bruise to her lower abdomen,knows of no injury,about the size of the palm of your hand,on Eliquis,only slightly tender to touch.

## 2021-07-19 NOTE — TELEPHONE ENCOUNTER
I'm not worried about it. Has it grown? Eliquis will give you random bruising. Her liver enzymes and platelets at her last set of labs were excellent. Her kidneys were a little dry so I would recommend more fluids. She can ice it/heat it should she prefer, but honeslty, I would just leave it alone and monitor it.      Patient notified & verbalized understanding,no it has not grown.

## 2021-07-19 NOTE — TELEPHONE ENCOUNTER
I'm not worried about it. Has it grown? Eliquis will give you random bruising. Her liver enzymes and platelets at her last set of labs were excellent. Her kidneys were a little dry so I would recommend more fluids. She can ice it/heat it should she prefer, but honeslty, I would just leave it alone and monitor it.

## 2021-07-19 NOTE — TELEPHONE ENCOUNTER
PATIENT SAID THAT SHE HAS A BRUISE ON HER LOWER RIGHT SIDE AND SHE WOULD LIKE A CALLBACK TO TALK TO SOMEONE ABOUT WHAT SHE NEEDS TO DO ABOUT IT AND TREAT IT.    CONTACT: [phone]

## 2021-07-28 RX ORDER — FLECAINIDE ACETATE 50 MG/1
50 TABLET ORAL 2 TIMES DAILY
Qty: 180 TABLET | Refills: 3 | Status: SHIPPED | OUTPATIENT
Start: 2021-07-28 | End: 2022-08-08

## 2021-08-04 ENCOUNTER — HOSPITAL ENCOUNTER (OUTPATIENT)
Dept: CARDIOLOGY | Facility: HOSPITAL | Age: 61
Discharge: HOME | End: 2021-08-04

## 2021-08-04 VITALS
OXYGEN SATURATION: 98 % | SYSTOLIC BLOOD PRESSURE: 171 MMHG | HEART RATE: 48 BPM | HEIGHT: 65 IN | BODY MASS INDEX: 29.82 KG/M2 | WEIGHT: 179 LBS | DIASTOLIC BLOOD PRESSURE: 84 MMHG

## 2021-08-04 DIAGNOSIS — I20.9 ANGINA PECTORIS (HCC): ICD-10-CM

## 2021-08-04 LAB
BH CV REST NUCLEAR ISOTOPE DOSE: 9.4 MCI
BH CV STRESS BP STAGE 1: NORMAL
BH CV STRESS DURATION MIN STAGE 1: 3
BH CV STRESS DURATION MIN STAGE 2: 3
BH CV STRESS DURATION MIN STAGE 3: 2
BH CV STRESS DURATION SEC STAGE 1: 0
BH CV STRESS DURATION SEC STAGE 2: 0
BH CV STRESS DURATION SEC STAGE 3: 45
BH CV STRESS GRADE STAGE 1: 10
BH CV STRESS GRADE STAGE 2: 12
BH CV STRESS GRADE STAGE 3: 14
BH CV STRESS HR STAGE 1: 99
BH CV STRESS HR STAGE 2: 120
BH CV STRESS HR STAGE 3: 147
BH CV STRESS METS STAGE 1: 5
BH CV STRESS METS STAGE 2: 7.5
BH CV STRESS METS STAGE 3: 8.9
BH CV STRESS NUCLEAR ISOTOPE DOSE: 29.3 MCI
BH CV STRESS O2 STAGE 1: 99
BH CV STRESS O2 STAGE 2: 98
BH CV STRESS O2 STAGE 3: 99
BH CV STRESS PROTOCOL 1: NORMAL
BH CV STRESS RECOVERY BP: NORMAL MMHG
BH CV STRESS RECOVERY HR: 91 BPM
BH CV STRESS RECOVERY O2: 98 %
BH CV STRESS SPEED STAGE 1: 1.7
BH CV STRESS SPEED STAGE 2: 2.5
BH CV STRESS SPEED STAGE 3: 3.4
BH CV STRESS STAGE 1: 1
BH CV STRESS STAGE 2: 2
BH CV STRESS STAGE 3: 3
LV EF NUC BP: 73 %
MAXIMAL PREDICTED HEART RATE: 160 BPM
PERCENT MAX PREDICTED HR: 95 %
STRESS BASELINE BP: NORMAL MMHG
STRESS BASELINE HR: 50 BPM
STRESS O2 SAT REST: 98 %
STRESS PERCENT HR: 112 %
STRESS POST ESTIMATED WORKLOAD: 8.9 METS
STRESS POST EXERCISE DUR MIN: 8 MIN
STRESS POST EXERCISE DUR SEC: 45 SEC
STRESS POST O2 SAT PEAK: 98 %
STRESS POST PEAK BP: NORMAL MMHG
STRESS POST PEAK HR: 152 BPM
STRESS TARGET HR: 136 BPM

## 2021-08-04 PROCEDURE — A9500 TC99M SESTAMIBI: HCPCS | Performed by: INTERNAL MEDICINE

## 2021-08-04 PROCEDURE — 93017 CV STRESS TEST TRACING ONLY: CPT

## 2021-08-04 PROCEDURE — 78452 HT MUSCLE IMAGE SPECT MULT: CPT | Performed by: INTERNAL MEDICINE

## 2021-08-04 PROCEDURE — 0 TECHNETIUM SESTAMIBI: Performed by: INTERNAL MEDICINE

## 2021-08-04 PROCEDURE — 93018 CV STRESS TEST I&R ONLY: CPT | Performed by: INTERNAL MEDICINE

## 2021-08-04 PROCEDURE — 78452 HT MUSCLE IMAGE SPECT MULT: CPT

## 2021-08-04 RX ADMIN — TECHNETIUM TC 99M SESTAMIBI 1 DOSE: 1 INJECTION INTRAVENOUS at 08:45

## 2021-08-06 ENCOUNTER — TELEPHONE (OUTPATIENT)
Dept: FAMILY MEDICINE CLINIC | Facility: CLINIC | Age: 61
End: 2021-08-06

## 2021-08-06 ENCOUNTER — OFFICE VISIT (OUTPATIENT)
Dept: FAMILY MEDICINE CLINIC | Facility: CLINIC | Age: 61
End: 2021-08-06

## 2021-08-06 VITALS
WEIGHT: 181.2 LBS | TEMPERATURE: 97.6 F | HEIGHT: 65 IN | DIASTOLIC BLOOD PRESSURE: 72 MMHG | SYSTOLIC BLOOD PRESSURE: 120 MMHG | BODY MASS INDEX: 30.19 KG/M2 | HEART RATE: 64 BPM | OXYGEN SATURATION: 98 %

## 2021-08-06 DIAGNOSIS — T14.8XXA BRUISING: Primary | ICD-10-CM

## 2021-08-06 LAB
ALBUMIN SERPL-MCNC: 4.22 G/DL (ref 3.5–5.2)
ALBUMIN/GLOB SERPL: 1.2 G/DL
ALP SERPL-CCNC: 68 U/L (ref 39–117)
ALT SERPL W P-5'-P-CCNC: 26 U/L (ref 1–33)
ANION GAP SERPL CALCULATED.3IONS-SCNC: 11.2 MMOL/L (ref 5–15)
AST SERPL-CCNC: 27 U/L (ref 1–32)
BASOPHILS # BLD AUTO: 0.03 10*3/MM3 (ref 0–0.2)
BASOPHILS NFR BLD AUTO: 0.3 % (ref 0–1.5)
BILIRUB SERPL-MCNC: 0.3 MG/DL (ref 0–1.2)
BUN SERPL-MCNC: 21 MG/DL (ref 8–23)
BUN/CREAT SERPL: 17.6 (ref 7–25)
CALCIUM SPEC-SCNC: 9.5 MG/DL (ref 8.6–10.5)
CHLORIDE SERPL-SCNC: 104 MMOL/L (ref 98–107)
CO2 SERPL-SCNC: 24.8 MMOL/L (ref 22–29)
CREAT SERPL-MCNC: 1.19 MG/DL (ref 0.57–1)
DEPRECATED RDW RBC AUTO: 47.3 FL (ref 37–54)
EOSINOPHIL # BLD AUTO: 0.27 10*3/MM3 (ref 0–0.4)
EOSINOPHIL NFR BLD AUTO: 3.1 % (ref 0.3–6.2)
ERYTHROCYTE [DISTWIDTH] IN BLOOD BY AUTOMATED COUNT: 13.5 % (ref 12.3–15.4)
GFR SERPL CREATININE-BSD FRML MDRD: 46 ML/MIN/1.73
GLOBULIN UR ELPH-MCNC: 3.5 GM/DL
GLUCOSE SERPL-MCNC: 84 MG/DL (ref 65–99)
HCT VFR BLD AUTO: 39.9 % (ref 34–46.6)
HGB BLD-MCNC: 12.9 G/DL (ref 12–15.9)
IMM GRANULOCYTES # BLD AUTO: 0.02 10*3/MM3 (ref 0–0.05)
IMM GRANULOCYTES NFR BLD AUTO: 0.2 % (ref 0–0.5)
INR PPP: 1.1 (ref 0.9–1.1)
LYMPHOCYTES # BLD AUTO: 2.46 10*3/MM3 (ref 0.7–3.1)
LYMPHOCYTES NFR BLD AUTO: 27.9 % (ref 19.6–45.3)
MCH RBC QN AUTO: 31.2 PG (ref 26.6–33)
MCHC RBC AUTO-ENTMCNC: 32.3 G/DL (ref 31.5–35.7)
MCV RBC AUTO: 96.4 FL (ref 79–97)
MONOCYTES # BLD AUTO: 0.69 10*3/MM3 (ref 0.1–0.9)
MONOCYTES NFR BLD AUTO: 7.8 % (ref 5–12)
NEUTROPHILS NFR BLD AUTO: 5.34 10*3/MM3 (ref 1.7–7)
NEUTROPHILS NFR BLD AUTO: 60.7 % (ref 42.7–76)
NRBC BLD AUTO-RTO: 0 /100 WBC (ref 0–0.2)
PLATELET # BLD AUTO: 232 10*3/MM3 (ref 140–450)
PMV BLD AUTO: 12.8 FL (ref 6–12)
POTASSIUM SERPL-SCNC: 4 MMOL/L (ref 3.5–5.2)
PROT SERPL-MCNC: 7.7 G/DL (ref 6–8.5)
PROTHROMBIN TIME: 14.7 SECONDS (ref 12.8–14.5)
RBC # BLD AUTO: 4.14 10*6/MM3 (ref 3.77–5.28)
SODIUM SERPL-SCNC: 140 MMOL/L (ref 136–145)
WBC # BLD AUTO: 8.81 10*3/MM3 (ref 3.4–10.8)

## 2021-08-06 PROCEDURE — 36415 COLL VENOUS BLD VENIPUNCTURE: CPT | Performed by: FAMILY MEDICINE

## 2021-08-06 PROCEDURE — 99213 OFFICE O/P EST LOW 20 MIN: CPT | Performed by: FAMILY MEDICINE

## 2021-08-06 PROCEDURE — 80053 COMPREHEN METABOLIC PANEL: CPT | Performed by: FAMILY MEDICINE

## 2021-08-06 PROCEDURE — 85025 COMPLETE CBC W/AUTO DIFF WBC: CPT | Performed by: FAMILY MEDICINE

## 2021-08-06 PROCEDURE — 85610 PROTHROMBIN TIME: CPT | Performed by: FAMILY MEDICINE

## 2021-08-06 NOTE — TELEPHONE ENCOUNTER
Caller: Mercedes Dale    Relationship: Self    Best call back number: [phone]     What was the call regarding: PATIENT CALLED STATING THAT SHE IS HAVING SOME DISCOMFORT IN THE BRUISE THAT SHE HAS.     Do you require a callback: YES

## 2021-08-06 NOTE — PROGRESS NOTES
"Mercedes Dale     VITALS: Blood pressure 120/72, pulse 64, temperature 97.6 °F (36.4 °C), height 165.1 cm (65\"), weight 82.2 kg (181 lb 3.2 oz), SpO2 98 %.    Subjective  Chief Complaint  Bleeding/Bruising ((Stomach))    Subjective          History of Present Illness:  Patient is a 60 y.o.  female with medical conditions significant for anxiety who presents to clinic secondary to an acute concern.     She has a bruise over her lower right abdomen that has not resolved. She states that today she thought that it felt warm all over inside her. She denies fever or chills. She does state that yesterday she had sharp pains in the abdomen and that she felt that it was different.     She is currently taking Xarelto.     No complaints about any of the medications.    The following portions of the patient's history were reviewed and updated as appropriate: allergies, current medications, past family history, past medical history, past social history, past surgical history and problem list.    Past Medical History  Past Medical History:   Diagnosis Date   • Abnormal heart rhythm    • Anxiety 9/1/2016   • Anxiety    • Back pain    • Constipation 9/1/2016   • Decreased libido    • Diverticulitis    • Heart palpitations    • Heartburn    • History of echocardiogram    • History of Holter monitoring    • History of stress test    • Hot flashes 9/1/2016   • Hyperlipidemia    • Hypertension 9/1/2016   • Insomnia 9/1/2016   • Mitral valve prolapse    • Reflux esophagitis    • Sinusitis    • GEORGE (stress urinary incontinence, female) 9/1/2016       Surgical History  Past Surgical History:   Procedure Laterality Date   • GALLBLADDER SURGERY  2012   • KNEE ARTHROSCOPY      scope twice   • LAPAROSCOPIC ASSISTED VAGINAL HYSTERECTOMY SALPINGO OOPHORECTOMY      51       Family History  Family History   Problem Relation Age of Onset   • Breast cancer Cousin    • Stroke Mother    • Dementia Mother    • Mitral valve prolapse Mother    • Heart " "attack Mother    • Hypertension Mother    • Cancer Mother    • COPD Mother    • Kidney disease Mother    • Heart attack Father    • Hypertension Sister    • Thyroid disease Sister    • Anxiety disorder Sister    • Depression Sister    • Diabetes Maternal Grandmother    • Diabetes Paternal Grandmother        Social History  Social History     Socioeconomic History   • Marital status:      Spouse name: Not on file   • Number of children: 2   • Years of education: Not on file   • Highest education level: Not on file   Tobacco Use   • Smoking status: Never Smoker   • Smokeless tobacco: Never Used   Vaping Use   • Vaping Use: Never used   Substance and Sexual Activity   • Alcohol use: No   • Drug use: No   • Sexual activity: Yes     Partners: Male     Birth control/protection: Surgical       Objective   Vital Signs:   /72 (BP Location: Right arm, Patient Position: Sitting, Cuff Size: Adult)   Pulse 64   Temp 97.6 °F (36.4 °C)   Ht 165.1 cm (65\")   Wt 82.2 kg (181 lb 3.2 oz)   SpO2 98%   BMI 30.15 kg/m²     Physical Exam     Gen: Patient in NAD. Pleasant and answers appropriately. A&Ox3.    Skin: Warm and dry with normal turgor. No purpura, rashes, or unusual pigmentation noted. Hair is normal in appearance and distribution.    HEENT: NC/AT. No lesions noted. Conjunctiva clear, sclera nonicteric. PERRL. EOMI without nystagmus or strabismus. Fundi appear benign. No hemorrhages or exudates of eyes. Auditory canals are patent bilaterally without lesions. TMs intact,  nonerythematous, nonbulging without lesions. Nasal mucosa pink, nonerythematous, and nonedematous. Frontal and maxillary sinuses are nontender. O/P nonerythematous and moist without exudate.    Neck: Supple without lymph nodes palpated. FROM.     Lungs: CTA B/L without rales, rhonchi, crackles, or wheezes.    Heart: RRR. S1 and S2 normal. No S3 or S4. No MRGT.    Abd: Soft, nontender,nondistended. (+)BSx4 quadrants.  There is no bruising noted " over the area where she is pointing out which is to the right of the umbilicus.    Extrem: No CCE. Radial pulses 2+/4 and equal B/L. FROMx4. No bone, joint, or muscle tenderness noted.    Neuro: No focal motor/sensory deficits.    Procedures     Assessment and Plan    Mercedes Dale is a 60 y.o. here for an acute concern.    Problem List Items Addressed This Visit     None      Visit Diagnoses     Bruising    -  Primary    Relevant Orders    CBC Auto Differential    Comprehensive Metabolic Panel    Protime-INR  The bruising is barely visible today. I advised her that it is mostly likely a side-effect of the Xarelto.            Patient's Body mass index is 30.15 kg/m². indicating that she is obese (BMI >30). Obesity-related health conditions include the following: dyslipidemias. Obesity is unchanged. BMI is is above average; BMI management plan is completed. We discussed portion control and increasing exercise..         Follow Up   Return in about 3 months (around 11/6/2021), or LABS.  Findings and plans discussed with patient who verbalizes understanding and agreement. Will followup with patient once results are in. Patient was given instructions and counseling regarding her condition or for health maintenance advice. Please see specific information pulled into the AVS if appropriate.       Transcribed from ambient dictation for Jessica Graham MD by Aislinn Dhillon.  08/06/21   15:56 EDT    I have personally performed the services described in this document as transcribed by the above individual, and it is both accurate and complete.  Jessica Graham MD  8/23/2021  07:01 EDT

## 2021-08-06 NOTE — TELEPHONE ENCOUNTER
Caller: Mercedes Dale    Relationship: Self    Best call back number: [phone]     What was the call regarding: PATIENT CALLED STATING THAT SHE IS HAVING SOME DISCOMFORT IN THE BRUISE THAT SHE HAS.     Do you require a callback: YES      Spoke with patient,she reports that the previous Bruise area she had called about on the 19 th has resolved but there is still like a \"shadow\" in that area,yesterday she had sharp pains in this previous area on her lower abdomen almost like a pin prick,today is just an uncomfortable feeling & feels abnormally warm on the inside ,no warmth to touch on the outside,no redness,a friend told her she may need her platelets checked or an MRI that she may have an internal hemorrhage,please advise.  "

## 2021-08-06 NOTE — TELEPHONE ENCOUNTER
It's the second time she's called about it. I looked at her pictures that she sent via message,and it looks okay. Labs were okay back in June, but we could recheck. Do I have room for an acute today? Just put her on the schedule.     I know.Scheduled.

## 2021-08-06 NOTE — TELEPHONE ENCOUNTER
It's the second time she's called about it. I looked at her pictures that she sent via message,and it looks okay. Labs were okay back in June, but we could recheck. Do I have room for an acute today? Just put her on the schedule.

## 2021-11-05 ENCOUNTER — OFFICE VISIT (OUTPATIENT)
Dept: FAMILY MEDICINE CLINIC | Facility: CLINIC | Age: 61
End: 2021-11-05

## 2021-11-05 VITALS
HEIGHT: 65 IN | SYSTOLIC BLOOD PRESSURE: 120 MMHG | BODY MASS INDEX: 30.29 KG/M2 | WEIGHT: 181.8 LBS | HEART RATE: 60 BPM | DIASTOLIC BLOOD PRESSURE: 70 MMHG | OXYGEN SATURATION: 97 % | TEMPERATURE: 97.4 F

## 2021-11-05 DIAGNOSIS — R79.89 ELEVATED SERUM CREATININE: ICD-10-CM

## 2021-11-05 DIAGNOSIS — E78.49 OTHER HYPERLIPIDEMIA: Primary | ICD-10-CM

## 2021-11-05 DIAGNOSIS — I10 ESSENTIAL HYPERTENSION: ICD-10-CM

## 2021-11-05 PROCEDURE — 99214 OFFICE O/P EST MOD 30 MIN: CPT | Performed by: FAMILY MEDICINE

## 2021-11-05 RX ORDER — ATORVASTATIN CALCIUM 10 MG/1
10 TABLET, FILM COATED ORAL DAILY
Qty: 90 TABLET | Refills: 1 | Status: SHIPPED | OUTPATIENT
Start: 2021-11-05 | End: 2022-05-12 | Stop reason: SDUPTHER

## 2021-11-05 RX ORDER — DILTIAZEM HYDROCHLORIDE 240 MG/1
240 CAPSULE, EXTENDED RELEASE ORAL DAILY
Qty: 90 CAPSULE | Refills: 1 | Status: SHIPPED | OUTPATIENT
Start: 2021-11-05 | End: 2022-05-12 | Stop reason: SDUPTHER

## 2021-11-05 NOTE — PROGRESS NOTES
"Mercedes Dale     VITALS: Blood pressure 120/70, pulse 60, temperature 97.4 °F (36.3 °C), height 165.1 cm (65\"), weight 82.5 kg (181 lb 12.8 oz), SpO2 97 %.    Subjective  Chief Complaint  Sinusitis, Kidney Follow-up, and Ear Drainage ((Right ear))    Subjective          History of Present Illness:  Patient is a 60 y.o.  female with medical conditions significant for anxiety who presents to clinic secondary to medical follow-up.    The patient states that she continues to have a rhinorrhea and her right ear is . She reports that she was given Augmentin at [facility] and she finished it on 11/03/2021. The patient states that she was not able to get an appointment at our office when she was experiencing post nasal drip, significant sinus congestion, as well as sinus pain. She states that she contacted a friend for some medication for relief and she was given a steroid injection and an antibiotic; \"the steroid injection kept me awake for 2-days\". The patient reports that her tinnitus worsened to the point of the patient wearing headphones to sleep. She reports that she takes Xyzal. The patient reports that she had a reaction once to Sudafed; \"I turned blood red, had a rash, and felt like I was having a heart attack\". She states that her cardiologist then said she was to not take Sudafed again. The patient notes she is unable to take any medication containing codeine.    She states that she followed prior instruction to take half of her hormone pill and she notes no difference in her mood. The patient reports that she has been taking melatonin per her pharmacists instruction to taper off of Ambien. She adds that she has been taking 5 mg of Ambien for approximately 1-year. The patient reports that she does get up at night-time to urinate. She notes of fatigue today. The patient reports she that she last filled her Ambien on 11/03/2021; however, she has no more refills remaining.     She notes that Dr. Ribera recommended " "the patient to no longer take Protonix and monitor her symptoms. The patient adds that she experienced burning; therefore, for 10-days she took Protonix every other day, and the following week she was instructed to take Protonix once every three days. The patient reports on the third day she did experience burning.     She reports that she has been consuming half a gallon of water and some days she is able to drink a full gallon. She adds that on some days she feels as if she is not able to consume enough water secondary to her kidney function. She states that she has had a soft drink approximately 4 times and she is now consuming green tea.      Additionally, she reports that when she has followed the Mediterranean diet she was successful in losing weight. The patient notes that she received her COVID-19 vaccination, Moderna. Following receiving her vaccination she notes that she was barely able to perform flexion with her knee, \"I was so swollen\", and other joints became swollen; however, the swelling would go down.     No complaints about any of the medications.    The following portions of the patient's history were reviewed and updated as appropriate: allergies, current medications, past family history, past medical history, past social history, past surgical history and problem list.    Past Medical History  Past Medical History:   Diagnosis Date   • Abnormal heart rhythm    • Anxiety 9/1/2016   • Anxiety    • Back pain    • Constipation 9/1/2016   • Decreased libido    • Diverticulitis    • Heart palpitations    • Heartburn    • History of echocardiogram    • History of Holter monitoring    • History of stress test    • Hot flashes 9/1/2016   • Hyperlipidemia    • Hypertension 9/1/2016   • Insomnia 9/1/2016   • Mitral valve prolapse    • Reflux esophagitis    • Sinusitis    • GEORGE (stress urinary incontinence, female) 9/1/2016       Surgical History  Past Surgical History:   Procedure Laterality Date   • " "GALLBLADDER SURGERY  2012   • KNEE ARTHROSCOPY      scope twice   • LAPAROSCOPIC ASSISTED VAGINAL HYSTERECTOMY SALPINGO OOPHORECTOMY      51       Family History  Family History   Problem Relation Age of Onset   • Breast cancer Cousin    • Stroke Mother    • Dementia Mother    • Mitral valve prolapse Mother    • Heart attack Mother    • Hypertension Mother    • Cancer Mother    • COPD Mother    • Kidney disease Mother    • Heart attack Father    • Hypertension Sister    • Thyroid disease Sister    • Anxiety disorder Sister    • Depression Sister    • Diabetes Maternal Grandmother    • Diabetes Paternal Grandmother        Social History  Social History     Socioeconomic History   • Marital status:    • Number of children: 2   Tobacco Use   • Smoking status: Never Smoker   • Smokeless tobacco: Never Used   Vaping Use   • Vaping Use: Never used   Substance and Sexual Activity   • Alcohol use: No   • Drug use: No   • Sexual activity: Yes     Partners: Male     Birth control/protection: Surgical       Objective   Vital Signs:   /70 (BP Location: Right arm, Patient Position: Sitting, Cuff Size: Adult)   Pulse 60   Temp 97.4 °F (36.3 °C)   Ht 165.1 cm (65\")   Wt 82.5 kg (181 lb 12.8 oz)   SpO2 97%   BMI 30.25 kg/m²     Physical Exam     Gen: Patient in NAD. Pleasant and answers appropriately. A&Ox3.    Skin: Warm and dry with normal turgor. No purpura, rashes, or unusual pigmentation noted. Hair is normal in appearance and distribution.    HEENT: NC/AT. No lesions noted. Conjunctiva clear, sclera nonicteric. PERRL. EOMI without nystagmus or strabismus. Fundi appear benign. No hemorrhages or exudates of eyes. Auditory canals are patent bilaterally without lesions. TMs intact,  nonerythematous, nonbulging without lesions. Nasal mucosa pink, nonerythematous, and nonedematous. Frontal and maxillary sinuses are nontender. O/P nonerythematous and moist without exudate.    Neck: Supple without lymph nodes " palpated. FROM.     Lungs: CTA B/L without rales, rhonchi, crackles, or wheezes.    Heart: RRR. S1 and S2 normal. No S3 or S4. No MRGT.    Abd: Soft, nontender,nondistended. (+)BSx4 quadrants.     Extrem: No CCE. Radial pulses 2+/4 and equal B/L. FROMx4. No bone, joint, or muscle tenderness noted.    Neuro: No focal motor/sensory deficits.    Procedures    Result Review :   The following data was reviewed by: Jessica Graham MD on 11/05/2021:                Assessment and Plan    Mercedes Dale is a 60 y.o. here for medical followup.    Problem List Items Addressed This Visit        Cardiac and Vasculature    Hyperlipidemia, unspecified - Primary    Relevant Medications    atorvastatin (LIPITOR) 10 MG tablet    - Medication refills are sent to her pharmacy as above.      Other Visit Diagnoses     Essential hypertension        Relevant Medications    Cartia  MG 24 hr capsule    - Medication refills are sent to her pharmacy as above.    Elevated serum creatinine        Relevant Orders    Comprehensive Metabolic Panel    - I will obtain blood work as above.                  Follow Up   Return in about 3 months (around 2/5/2022), or LABS.  Findings and plans discussed with patient who verbalizes understanding and agreement. Will followup with patient once results are in. Patient was given instructions and counseling regarding her condition or for health maintenance advice. Please see specific information pulled into the AVS if appropriate.       Transcribed from ambient dictation for Jessica Graham MD by Cece Alonso.  11/05/21   13:57 EDT    Patient verbalized consent to the visit recording.  I have personally performed the services described in this document as transcribed by the above individual, and it is both accurate and complete.  Jessica Graham MD  11/22/2021  07:59 EST

## 2021-11-08 ENCOUNTER — OFFICE VISIT (OUTPATIENT)
Dept: FAMILY MEDICINE CLINIC | Facility: CLINIC | Age: 61
End: 2021-11-08

## 2021-11-08 VITALS
OXYGEN SATURATION: 98 % | TEMPERATURE: 97.2 F | SYSTOLIC BLOOD PRESSURE: 158 MMHG | WEIGHT: 181.4 LBS | DIASTOLIC BLOOD PRESSURE: 80 MMHG | HEIGHT: 65 IN | BODY MASS INDEX: 30.22 KG/M2 | HEART RATE: 62 BPM

## 2021-11-08 DIAGNOSIS — T78.40XA ALLERGIC REACTION, INITIAL ENCOUNTER: Primary | ICD-10-CM

## 2021-11-08 PROCEDURE — 99213 OFFICE O/P EST LOW 20 MIN: CPT | Performed by: FAMILY MEDICINE

## 2021-11-08 PROCEDURE — 96372 THER/PROPH/DIAG INJ SC/IM: CPT | Performed by: FAMILY MEDICINE

## 2021-11-08 RX ORDER — PHENOL 1.4 %
1 AEROSOL, SPRAY (ML) MUCOUS MEMBRANE NIGHTLY
COMMUNITY
End: 2022-05-12

## 2021-11-08 RX ORDER — FAMOTIDINE 40 MG/1
40 TABLET, FILM COATED ORAL DAILY
Qty: 14 TABLET | Refills: 0 | Status: SHIPPED | OUTPATIENT
Start: 2021-11-08 | End: 2022-01-04

## 2021-11-08 RX ORDER — METHYLPREDNISOLONE SODIUM SUCCINATE 40 MG/ML
80 INJECTION, POWDER, LYOPHILIZED, FOR SOLUTION INTRAMUSCULAR; INTRAVENOUS ONCE
Status: COMPLETED | OUTPATIENT
Start: 2021-11-08 | End: 2021-11-08

## 2021-11-08 RX ORDER — TRIAMCINOLONE ACETONIDE 1 MG/G
1 CREAM TOPICAL 3 TIMES DAILY
Qty: 80 G | Refills: 0 | Status: SHIPPED | OUTPATIENT
Start: 2021-11-08 | End: 2022-01-04

## 2021-11-08 RX ORDER — CETIRIZINE HYDROCHLORIDE 10 MG/1
10 TABLET ORAL DAILY
Qty: 14 TABLET | Refills: 0 | Status: SHIPPED | OUTPATIENT
Start: 2021-11-08 | End: 2022-01-04 | Stop reason: SDUPTHER

## 2021-11-08 RX ORDER — PREDNISONE 10 MG/1
TABLET ORAL
Qty: 30 TABLET | Refills: 0 | Status: SHIPPED | OUTPATIENT
Start: 2021-11-08 | End: 2022-01-04

## 2021-11-08 RX ADMIN — METHYLPREDNISOLONE SODIUM SUCCINATE 80 MG: 40 INJECTION, POWDER, LYOPHILIZED, FOR SOLUTION INTRAMUSCULAR; INTRAVENOUS at 14:52

## 2021-11-08 NOTE — PATIENT INSTRUCTIONS
Start prednisone pack tomorrow.  Take the zyrtec instead of the xyzal. Once you finish the zyrtec, go fito to xyzal.     Take both the pepcid and the protonix.  Can use OTC benadryl.  Can use OTC benadryl gel.  Use steroid cream if you are going to be inside, but use the benadryl gel if you are going to be outside.

## 2021-11-08 NOTE — PROGRESS NOTES
"Mercedes Dale     VITALS: Blood pressure 158/80, pulse 62, temperature 97.2 °F (36.2 °C), height 165.1 cm (65\"), weight 82.3 kg (181 lb 6.4 oz), SpO2 98 %.    Subjective  Chief Complaint  Allergic Reaction    Subjective          History of Present Illness:  Patient is a 60 y.o.  female with medical conditions significant for atrial fibrillation, hypertension, and hyperlipidemia who presents to clinic for medical follow-up. She has had an allergic reaction.    She reports that her Labium inferius oris has became \"hard\" in the last 2- hours and her ears were swollen on 11/07/2021. She reports that her face is still swollen and red. The patient denies having any trouble breathing, or problems with her tongue. She states that she has erythema between her buttocks, on the area where her \"panty line\", as well as a rash on her anus that is itching. The patient states that she pulled weeds out of her garden on 11/05/2021 and she did not have her gloves on.  She states that she took 2 Benadryl and in approximately 1 hour she felt like her \"mouth was not as tight\". The patient reports that she took melatonin last week and her  told her that she did fall asleep. She states that she does not take Benadryl often. She notes that she only has over-the-counter cortisone topical cream.    No complaints about any of the medications.    The following portions of the patient's history were reviewed and updated as appropriate: allergies, current medications, past family history, past medical history, past social history, past surgical history and problem list.    Past Medical History  Past Medical History:   Diagnosis Date   • Abnormal heart rhythm    • Anxiety 9/1/2016   • Anxiety    • Back pain    • Constipation 9/1/2016   • Decreased libido    • Diverticulitis    • Heart palpitations    • Heartburn    • History of echocardiogram    • History of Holter monitoring    • History of stress test    • Hot flashes 9/1/2016   • " "Hyperlipidemia    • Hypertension 9/1/2016   • Insomnia 9/1/2016   • Mitral valve prolapse    • Reflux esophagitis    • Sinusitis    • GEORGE (stress urinary incontinence, female) 9/1/2016       Surgical History  Past Surgical History:   Procedure Laterality Date   • GALLBLADDER SURGERY  2012   • KNEE ARTHROSCOPY      scope twice   • LAPAROSCOPIC ASSISTED VAGINAL HYSTERECTOMY SALPINGO OOPHORECTOMY      51       Family History  Family History   Problem Relation Age of Onset   • Breast cancer Cousin    • Stroke Mother    • Dementia Mother    • Mitral valve prolapse Mother    • Heart attack Mother    • Hypertension Mother    • Cancer Mother    • COPD Mother    • Kidney disease Mother    • Heart attack Father    • Hypertension Sister    • Thyroid disease Sister    • Anxiety disorder Sister    • Depression Sister    • Diabetes Maternal Grandmother    • Diabetes Paternal Grandmother        Social History  Social History     Socioeconomic History   • Marital status:    • Number of children: 2   Tobacco Use   • Smoking status: Never Smoker   • Smokeless tobacco: Never Used   Vaping Use   • Vaping Use: Never used   Substance and Sexual Activity   • Alcohol use: No   • Drug use: No   • Sexual activity: Yes     Partners: Male     Birth control/protection: Surgical       Objective   Vital Signs:   /80 (BP Location: Right arm, Patient Position: Sitting, Cuff Size: Adult)   Pulse 62   Temp 97.2 °F (36.2 °C)   Ht 165.1 cm (65\")   Wt 82.3 kg (181 lb 6.4 oz)   SpO2 98%   BMI 30.19 kg/m²     Physical Exam     Gen: Patient in NAD. Pleasant and answers appropriately. A&Ox3.    Skin: Warm and dry with normal turgor. No purpura or unusual pigmentation noted. Hair is normal in appearance and distribution.  Erythematous, nonedematous patches diffusely throughout body.    HEENT: NC/AT. No lesions noted. Conjunctiva clear, sclera nonicteric. PERRL. EOMI without nystagmus or strabismus. Fundi appear benign. No hemorrhages or " exudates of eyes. Auditory canals are patent bilaterally without lesions. TMs intact,  nonerythematous, nonbulging without lesions. Nasal mucosa pink, nonerythematous, and nonedematous. Frontal and maxillary sinuses are nontender. O/P erythematous and moist without exudate.    Neck: Supple without lymph nodes palpated. FROM.     Lungs: CTA B/L without rales, rhonchi, crackles, or wheezes.    Heart: RRR. S1 and S2 normal. No S3 or S4. No MRGT.    Abd: Soft, nontender,nondistended. (+)BSx4 quadrants.     Extrem: No CCE. Radial pulses 2+/4 and equal B/L. FROMx4.     Neuro: No focal motor/sensory deficits.    Procedures       Assessment and Plan    Mercedes Dale is a 60 y.o. here for an acute concern.    Problem List Items Addressed This Visit     None      Visit Diagnoses     Allergic reaction, initial encounter    -  Primary    Relevant Medications    methylPREDNISolone sodium succinate (SOLU-Medrol) injection 80 mg (Completed)    predniSONE (DELTASONE) 10 MG tablet    cetirizine (zyrTEC) 10 MG tablet    famotidine (Pepcid) 40 MG tablet    triamcinolone (KENALOG) 0.1 % cream    - The patient will receive a Solu-Medrol injection in the office today and she tolerated this well. She will also start a prednisone pack and we have discussed how she is to take this. I have also prescribed her Kenalog cream to be used topically as discussed. She will continue with Zyrtec as prescribed and she will start Pepcid as discussed today.             Patient's Body mass index is 30.19 kg/m². indicating that she is obese (BMI >30). Obesity-related health conditions include the following: dyslipidemias. Obesity is unchanged. BMI is is above average; BMI management plan is completed. We discussed portion control and increasing exercise..                 Follow Up   Return (already has appt) please print out d/c summary for instructions.  Findings and plans discussed with patient who verbalizes understanding and agreement. Will followup  with patient once results are in. Patient was given instructions and counseling regarding her condition or for health maintenance advice. Please see specific information pulled into the AVS if appropriate.       Transcribed from ambient dictation for Jessica Graham MD by Cece Alonso.  11/08/21   16:06 EST    Patient verbalized consent to the visit recording.  I have personally performed the services described in this document as transcribed by the above individual, and it is both accurate and complete.  Jessica Graham MD  11/29/2021  06:22 EST

## 2021-11-18 ENCOUNTER — HOSPITAL ENCOUNTER (OUTPATIENT)
Dept: MAMMOGRAPHY | Facility: HOSPITAL | Age: 61
Discharge: HOME | End: 2021-11-18
Admitting: FAMILY MEDICINE

## 2021-11-18 DIAGNOSIS — Z12.31 VISIT FOR SCREENING MAMMOGRAM: ICD-10-CM

## 2021-11-18 PROCEDURE — 77067 SCR MAMMO BI INCL CAD: CPT | Performed by: RADIOLOGY

## 2021-11-18 PROCEDURE — 77063 BREAST TOMOSYNTHESIS BI: CPT

## 2021-11-18 PROCEDURE — 77067 SCR MAMMO BI INCL CAD: CPT

## 2021-11-18 PROCEDURE — 77063 BREAST TOMOSYNTHESIS BI: CPT | Performed by: RADIOLOGY

## 2021-11-19 DIAGNOSIS — T78.40XA ALLERGIC REACTION, INITIAL ENCOUNTER: ICD-10-CM

## 2021-11-22 RX ORDER — FAMOTIDINE 40 MG/1
TABLET, FILM COATED ORAL
Qty: 14 TABLET | Refills: 0 | OUTPATIENT
Start: 2021-11-22

## 2021-12-03 DIAGNOSIS — G47.09 OTHER INSOMNIA: Primary | ICD-10-CM

## 2021-12-06 ENCOUNTER — TELEPHONE (OUTPATIENT)
Dept: GYNECOLOGIC ONCOLOGY | Facility: CLINIC | Age: 61
End: 2021-12-06

## 2021-12-06 NOTE — TELEPHONE ENCOUNTER
Caller: Mercedes Dale    Relationship: Self    Best call back number: [phone]    What form or medical record are you requesting: PROCEDURE NOTES    Who is requesting this form or medical record from you: DR ABDIRAHMAN EDWARDS  # [phone]  FAX # [phone]      Timeframe paperwork needed: ASAP

## 2021-12-07 RX ORDER — ZOLPIDEM TARTRATE 10 MG/1
5 TABLET ORAL EVERY OTHER DAY
Qty: 30 TABLET | Refills: 0 | Status: SHIPPED | OUTPATIENT
Start: 2021-12-07 | End: 2022-02-07 | Stop reason: SDUPTHER

## 2021-12-07 NOTE — TELEPHONE ENCOUNTER
Etelvina # in epic  Reviewed and is consistent.  S 6/2021 reviewed and is consistent.  Patient comfort assessment guide reviewed and updated today.    As part of the patient's treatment plan they are being prescribed a controlled substance/ substances with potential for abuse.  This patient has been made aware of the appropriate use of such medications, including potential risk of somnolence, limited ability to drive and/or work safely, and potential for overdose.  It has also been made clear these medications are for use by the patient only, without concomitant use of alcohol or other substances unless prescribed/advised by medical provider.    Patient has completed prescribing agreement detailing terms of continued prescribing of controlled substances including monitoring ETELVINA reports, urine drug screens, and pill counts.  The patient is aware ETELVINA reports are reviewed on a regular basis and scanned into the chart.    History and physical exam exhibit continued safe and appropriate use of controlled substances.    We are working on getting her off of xanax and ambien for sleep.

## 2021-12-07 NOTE — TELEPHONE ENCOUNTER
Etelvina # in epic  Reviewed and is consistent.  S 6/2021 reviewed and is consistent.  Patient comfort assessment guide reviewed and updated today.    As part of the patient's treatment plan they are being prescribed a controlled substance/ substances with potential for abuse.  This patient has been made aware of the appropriate use of such medications, including potential risk of somnolence, limited ability to drive and/or work safely, and potential for overdose.  It has also been made clear these medications are for use by the patient only, without concomitant use of alcohol or other substances unless prescribed/advised by medical provider.    Patient has completed prescribing agreement detailing terms of continued prescribing of controlled substances including monitoring ETELVINA reports, urine drug screens, and pill counts.  The patient is aware ETELVINA reports are reviewed on a regular basis and scanned into the chart.    History and physical exam exhibit continued safe and appropriate use of controlled substances.    We are working on getting her off of xanax and ambien for sleep.      Patient notified.

## 2021-12-09 ENCOUNTER — HOSPITAL ENCOUNTER (OUTPATIENT)
Dept: ULTRASOUND IMAGING | Facility: HOSPITAL | Age: 61
Discharge: HOME | End: 2021-12-09
Admitting: RADIOLOGY

## 2021-12-09 DIAGNOSIS — R92.8 ABNORMAL MAMMOGRAM OF LEFT BREAST: ICD-10-CM

## 2021-12-09 PROCEDURE — 76642 ULTRASOUND BREAST LIMITED: CPT

## 2021-12-09 PROCEDURE — 76642 ULTRASOUND BREAST LIMITED: CPT | Performed by: RADIOLOGY

## 2021-12-20 ENCOUNTER — TELEPHONE (OUTPATIENT)
Dept: FAMILY MEDICINE CLINIC | Facility: CLINIC | Age: 61
End: 2021-12-20

## 2021-12-20 NOTE — TELEPHONE ENCOUNTER
Patient called reports she saw you recently for a sinus infection then had an allergic reaction & was given a prednisone taper,completed that 2 days after she finsihed it she had symptoms of the common cold sore throat,nasal drainage & cough,had that for 2 weeks it resolved all except she has kept a dry, hacking,Barking cough,is wanting to get her flu shot but wants to get this cleared up first,cannot tolerate anything with Codeine,has never tries Tessalon Perles,please advise.

## 2021-12-22 RX ORDER — BROMPHENIRAMINE MALEATE, PSEUDOEPHEDRINE HYDROCHLORIDE, AND DEXTROMETHORPHAN HYDROBROMIDE 2; 30; 10 MG/5ML; MG/5ML; MG/5ML
5 SYRUP ORAL 3 TIMES DAILY PRN
Qty: 200 ML | Refills: 0 | Status: SHIPPED | OUTPATIENT
Start: 2021-12-22 | End: 2022-02-07

## 2021-12-22 RX ORDER — BENZONATATE 100 MG/1
100 CAPSULE ORAL 3 TIMES DAILY PRN
Qty: 90 CAPSULE | Refills: 0 | Status: SHIPPED | OUTPATIENT
Start: 2021-12-22 | End: 2022-01-04

## 2021-12-22 NOTE — TELEPHONE ENCOUNTER
I sent in bromfed and tessalon perles. She can take them together. Let her know I think it is her sinuses again. Honey. Also humidifier. Thanks.        Patient notified & verbalized understanding.

## 2021-12-22 NOTE — TELEPHONE ENCOUNTER
I sent in bromfed and tessalon perles. She can take them together. Let her know I think it is her sinuses again. Honey. Also humidifier. Thanks.

## 2022-01-04 ENCOUNTER — OFFICE VISIT (OUTPATIENT)
Dept: FAMILY MEDICINE CLINIC | Facility: CLINIC | Age: 62
End: 2022-01-04

## 2022-01-04 VITALS — OXYGEN SATURATION: 99 % | HEART RATE: 59 BPM | TEMPERATURE: 96.9 F

## 2022-01-04 DIAGNOSIS — J01.10 ACUTE NON-RECURRENT FRONTAL SINUSITIS: ICD-10-CM

## 2022-01-04 DIAGNOSIS — Z20.822 EXPOSURE TO COVID-19 VIRUS: ICD-10-CM

## 2022-01-04 DIAGNOSIS — J22 LOWER RESPIRATORY INFECTION (E.G., BRONCHITIS, PNEUMONIA, PNEUMONITIS, PULMONITIS): Primary | ICD-10-CM

## 2022-01-04 PROCEDURE — U0004 COV-19 TEST NON-CDC HGH THRU: HCPCS | Performed by: NURSE PRACTITIONER

## 2022-01-04 PROCEDURE — 99213 OFFICE O/P EST LOW 20 MIN: CPT | Performed by: NURSE PRACTITIONER

## 2022-01-04 RX ORDER — AZITHROMYCIN 250 MG/1
TABLET, FILM COATED ORAL
Qty: 6 TABLET | Refills: 0 | Status: SHIPPED | OUTPATIENT
Start: 2022-01-04 | End: 2022-02-07

## 2022-01-04 RX ORDER — CETIRIZINE HYDROCHLORIDE 10 MG/1
10 TABLET ORAL DAILY
Qty: 14 TABLET | Refills: 0 | Status: SHIPPED | OUTPATIENT
Start: 2022-01-04 | End: 2022-01-11 | Stop reason: SDUPTHER

## 2022-01-04 RX ORDER — TRIAMCINOLONE ACETONIDE 55 UG/1
2 SPRAY, METERED NASAL DAILY
Qty: 16.5 G | Refills: 11 | Status: SHIPPED | OUTPATIENT
Start: 2022-01-04 | End: 2022-01-11 | Stop reason: SDUPTHER

## 2022-01-04 NOTE — PROGRESS NOTES
Chief Complaint  Cough    Subjective          Mercedes Dale is a 61 y.o. female who presents today to [facility] for follow up    HPI:   History of Present Illness    Presents to clinic for complaint of cough, sneezing, drainage, congestion, sinus pressure that started the first week of November.  Started to feel better for a few days around Thanksgiving and then felt worse again.  Has tried Tessalon Perles which helped but upset her stomach.  Has also been taking OTC Mucinex which has helped. Was exposed to Covid 19 on 12/25/2021.      The following portions of the patient's history were reviewed and updated as appropriate: allergies, current medications, past family history, past medical history, past social history, past surgical history and problem list.    Objective     Problem List:  Patient Active Problem List   Diagnosis   • Anxiety   • Insomnia   • GEORGE (stress urinary incontinence, female)   • Hypertension   • Constipation   • Decreased libido   • Well female exam with routine gynecological exam   • Midline cystocele   • Rectocele   • Right lower quadrant abdominal pain   • Paroxysmal atrial fibrillation (HCC)   • Low back pain   • Hyperlipidemia, unspecified       Allergy:   Allergies   Allergen Reactions   • Codeine Nausea And Vomiting   • Hydrocodone Nausea And Vomiting   • Morphine Nausea And Vomiting   • Penicillins Other (See Comments)     Intolerance   • Adhesive Tape Itching        Discontinued Medications:  Medications Discontinued During This Encounter   Medication Reason   • famotidine (Pepcid) 40 MG tablet Historical Med - Therapy completed   • benzonatate (Tessalon Perles) 100 MG capsule Historical Med - Therapy completed   • predniSONE (DELTASONE) 10 MG tablet Historical Med - Therapy completed   • Scopolamine (TRANSDERM-SCOP) 1.5 MG/3DAYS patch Historical Med - Therapy completed   • triamcinolone (KENALOG) 0.1 % cream Historical Med - Therapy completed   •  cough cetirizine (zyrTEC) 10 MG tablet Reorder       Current Medications:   Current Outpatient Medications   Medication Sig Dispense Refill   • ALPRAZolam (XANAX) 1 MG tablet 1 mg As Needed. 1/2 TABLET AS NEEDED     • atorvastatin (LIPITOR) 10 MG tablet Take 1 tablet by mouth Daily. 90 tablet 1   • brompheniramine-pseudoephedrine-DM 30-2-10 MG/5ML syrup Take 5 mL by mouth 3 (Three) Times a Day As Needed for Allergies. 200 mL 0   • Cartia  MG 24 hr capsule Take 1 capsule by mouth Daily. 90 capsule 1   • Eliquis 5 MG tablet tablet TAKE ONE TABLET BY MOUTH EVERY 12 HOURS 60 tablet 11   • estradiol (ESTRACE) 0.5 MG tablet TAKE ONE TABLET BY MOUTH DAILY (Patient taking differently: Take 0.25 mg by mouth Daily.) 30 tablet 11   • flecainide (TAMBOCOR) 50 MG tablet Take 1 tablet by mouth 2 (Two) Times a Day. 180 tablet 3   • Melatonin 10 MG tablet Take 1 tablet by mouth Every Night.     • Multiple Vitamins-Minerals (THRIVE FOR LIFE WOMENS) tablet Take 1 tablet by mouth Daily.     • pantoprazole (PROTONIX) 40 MG EC tablet 40 mg Every 3 (Three) Days.     • polyethylene glycol (MIRALAX) packet Take 17 g by mouth As Needed.     • zolpidem (AMBIEN) 10 MG tablet Take 0.5 tablets by mouth Every Other Day. 30 tablet 0   • azithromycin (Zithromax Z-Ken) 250 MG tablet Take 2 tablets by mouth on day 1, then 1 tablet daily on days 2-5 6 tablet 0   • cetirizine (zyrTEC) 10 MG tablet Take 1 tablet by mouth Daily. 14 tablet 0   • Triamcinolone Acetonide (Nasacort Allergy 24HR) 55 MCG/ACT nasal inhaler 2 sprays into the nostril(s) as directed by provider Daily. 16.5 g 11     No current facility-administered medications for this visit.       Past Medical History:  Past Medical History:   Diagnosis Date   • Abnormal heart rhythm    • Anxiety 9/1/2016   • Anxiety    • Back pain    • Constipation 9/1/2016   • Decreased libido    • Diverticulitis    • Heart palpitations    • Heartburn    • History of echocardiogram    • History of Holter  monitoring    • History of stress test    • Hot flashes 9/1/2016   • Hyperlipidemia    • Hypertension 9/1/2016   • Insomnia 9/1/2016   • Mitral valve prolapse    • Reflux esophagitis    • Sinusitis    • GEORGE (stress urinary incontinence, female) 9/1/2016       Past Surgical History:  Past Surgical History:   Procedure Laterality Date   • GALLBLADDER SURGERY  2012   • KNEE ARTHROSCOPY      scope twice   • LAPAROSCOPIC ASSISTED VAGINAL HYSTERECTOMY SALPINGO OOPHORECTOMY      51       Social History:  Social History     Socioeconomic History   • Marital status:    • Number of children: 2   Tobacco Use   • Smoking status: Never Smoker   • Smokeless tobacco: Never Used   Vaping Use   • Vaping Use: Never used   Substance and Sexual Activity   • Alcohol use: No   • Drug use: No   • Sexual activity: Yes     Partners: Male     Birth control/protection: Surgical       Family History:  Family History   Problem Relation Age of Onset   • Breast cancer Cousin    • Stroke Mother    • Dementia Mother    • Mitral valve prolapse Mother    • Heart attack Mother    • Hypertension Mother    • Cancer Mother    • COPD Mother    • Kidney disease Mother    • Heart attack Father    • Hypertension Sister    • Thyroid disease Sister    • Anxiety disorder Sister    • Depression Sister    • Diabetes Maternal Grandmother    • Diabetes Paternal Grandmother        Review of Systems:  Review of Systems   Respiratory: Negative for shortness of breath and wheezing.        Physical Exam:  Physical Exam  Vitals and nursing note reviewed.   Constitutional:       General: She is not in acute distress.     Appearance: Normal appearance. She is not ill-appearing or toxic-appearing.   HENT:      Head: Normocephalic.      Nose: Congestion present.      Right Sinus: Frontal sinus tenderness present.      Left Sinus: Frontal sinus tenderness present.      Mouth/Throat:      Lips: Pink.      Mouth: Mucous membranes are moist.      Comments:  Cobblestoning  Cardiovascular:      Rate and Rhythm: Normal rate and regular rhythm.      Heart sounds: Normal heart sounds.   Pulmonary:      Effort: Pulmonary effort is normal. No respiratory distress.      Breath sounds: Normal breath sounds.   Lymphadenopathy:      Cervical: No cervical adenopathy.   Skin:     General: Skin is warm and dry.      Coloration: Skin is not pale.   Neurological:      Mental Status: She is alert and oriented to person, place, and time.   Psychiatric:         Mood and Affect: Mood normal.         Behavior: Behavior normal.         Thought Content: Thought content normal.         Judgment: Judgment normal.     Agrees to be evaluated in vehicle as has been exposed to COVID-19.    Vital Signs:   Pulse 59   Temp 96.9 °F (36.1 °C)   SpO2 99%  Heart rate per auscultation: 63, RR: 16                Assessment and Plan   Diagnoses and all orders for this visit:    1. Lower respiratory infection (e.g., bronchitis, pneumonia, pneumonitis, pulmonitis) (Primary)  -     azithromycin (Zithromax Z-Ken) 250 MG tablet; Take 2 tablets by mouth on day 1, then 1 tablet daily on days 2-5  Dispense: 6 tablet; Refill: 0  -     Cancel: COVID-19,APTIMA PANTHER(KARTHIKEYAN),[facility], NP/OP SWAB IN UTM/VTM/SALINE TRANSPORT MEDIA,24 HR TAT - Swab, Nasopharynx; Future  -     COVID-19, APTIMA PANTHER [facility] IN-HOUSE NP/OP SWAB IN UTM/VTM/SALINE TRANSPORT MEDIA 24HR TAT - Swab, Nasopharynx  Cough and deep breathe.  Increase hydration and humidification.  Regimen as above.    2. Acute non-recurrent frontal sinusitis  -     cetirizine (zyrTEC) 10 MG tablet; Take 1 tablet by mouth Daily.  Dispense: 14 tablet; Refill: 0  -     Triamcinolone Acetonide (Nasacort Allergy 24HR) 55 MCG/ACT nasal inhaler; 2 sprays into the nostril(s) as directed by provider Daily.  Dispense: 16.5 g; Refill: 11  -     azithromycin (Zithromax Z-Ken) 250 MG tablet; Take 2 tablets by mouth on day 1, then 1 tablet daily on days 2-5  Dispense: 6  tablet; Refill: 0  -     Cancel: COVID-19,APTIMA PANTHER(KARTHIKEYAN),[facility], NP/OP SWAB IN UTM/VTM/SALINE TRANSPORT MEDIA,24 HR TAT - Swab, Nasopharynx; Future  -     COVID-19, APTIMA PANTHER [facility] IN-HOUSE NP/OP SWAB IN UTM/VTM/SALINE TRANSPORT MEDIA 24HR TAT - Swab, Nasopharynx  Regimen as above.    3. Exposure to COVID-19 virus  -     Cancel: COVID-19,APTIMA PANTHER(KARTHIKEYAN),[facility], NP/OP SWAB IN UTM/VTM/SALINE TRANSPORT MEDIA,24 HR TAT - Swab, Nasopharynx; Future  -     COVID-19, APTIMA PANTHER [facility] IN-HOUSE NP/OP SWAB IN UTM/VTM/SALINE TRANSPORT MEDIA 24HR TAT - Swab, Nasopharynx  Quarantine per CDC recommendations.      Discussed possible differential diagnoses, testing, treatment, recommended non-pharmacological interventions, risks, warning signs to monitor for that would indicate need for follow-up in clinic or ER. If no improvement with these regimens or you have new or worsening symptoms follow-up. Patient verbalizes understanding and agreement with plan of care. Denies further needs or concerns.     I spent 20 minutes caring for patient on this date of service. This time includes time spent by me in the following activities: preparing for the visit, reviewing tests, obtaining and/or reviewing a separately obtained history, performing a medically appropriate examination and/or evaluation, counseling and educating the patient/family/caregiver, ordering medications, tests, or procedures and documenting information in the medical record    Meds ordered during this visit:  New Medications Ordered This Visit   Medications   • cetirizine (zyrTEC) 10 MG tablet     Sig: Take 1 tablet by mouth Daily.     Dispense:  14 tablet     Refill:  0   • Triamcinolone Acetonide (Nasacort Allergy 24HR) 55 MCG/ACT nasal inhaler     Si sprays into the nostril(s) as directed by provider Daily.     Dispense:  16.5 g     Refill:  11   • azithromycin (Zithromax Z-Ken) 250 MG tablet     Sig: Take 2 tablets by mouth on day 1,  then 1 tablet daily on days 2-5     Dispense:  6 tablet     Refill:  0       Patient Instructions:  Patient instructions given for the following visit diagnosis:    ICD-10-CM ICD-9-CM   1. Lower respiratory infection (e.g., bronchitis, pneumonia, pneumonitis, pulmonitis)  J22 519.8   2. Acute non-recurrent frontal sinusitis  J01.10 461.1   3. Exposure to COVID-19 virus  Z20.822 V01.79       Follow Up   Return if symptoms worsen or fail to improve.        This document has been electronically signed by DARIO Dumas  January 4, 2022 09:28 EST    Patient was given instructions and counseling regarding her condition or for health maintenance advice. Please see specific information pulled into the AVS if appropriate.     Part of this note may be an electronic transcription/translation of spoken language to printed text using the Dragon Dictation System.

## 2022-01-05 ENCOUNTER — TELEPHONE (OUTPATIENT)
Dept: FAMILY MEDICINE CLINIC | Facility: CLINIC | Age: 62
End: 2022-01-05

## 2022-01-05 LAB — SARS-COV-2 RNA PNL SPEC NAA+PROBE: NOT DETECTED

## 2022-01-05 NOTE — TELEPHONE ENCOUNTER
----- Message from DARIO Dumas sent at 1/5/2022  8:03 AM EST -----  Please call the patient regarding her result.    Negative for COVID-19

## 2022-01-11 ENCOUNTER — TELEPHONE (OUTPATIENT)
Dept: FAMILY MEDICINE CLINIC | Facility: CLINIC | Age: 62
End: 2022-01-11

## 2022-01-11 DIAGNOSIS — J01.10 ACUTE NON-RECURRENT FRONTAL SINUSITIS: ICD-10-CM

## 2022-01-11 RX ORDER — TRIAMCINOLONE ACETONIDE 55 UG/1
2 SPRAY, METERED NASAL DAILY
Qty: 16.5 G | Refills: 11 | Status: SHIPPED | OUTPATIENT
Start: 2022-01-11 | End: 2022-12-05 | Stop reason: SDUPTHER

## 2022-01-11 RX ORDER — CETIRIZINE HYDROCHLORIDE 10 MG/1
10 TABLET ORAL DAILY
Qty: 14 TABLET | Refills: 0 | Status: SHIPPED | OUTPATIENT
Start: 2022-01-11

## 2022-01-11 NOTE — TELEPHONE ENCOUNTER
Patient called [facility] gave her the Z-Pack but stated they did not get Rx's for the Zyrtec & Nasacort,verified,resent per orders.

## 2022-02-07 ENCOUNTER — OFFICE VISIT (OUTPATIENT)
Dept: FAMILY MEDICINE CLINIC | Facility: CLINIC | Age: 62
End: 2022-02-07

## 2022-02-07 VITALS
WEIGHT: 183.2 LBS | TEMPERATURE: 97.1 F | OXYGEN SATURATION: 99 % | SYSTOLIC BLOOD PRESSURE: 130 MMHG | BODY MASS INDEX: 30.52 KG/M2 | HEIGHT: 65 IN | HEART RATE: 72 BPM | DIASTOLIC BLOOD PRESSURE: 76 MMHG

## 2022-02-07 DIAGNOSIS — G47.09 OTHER INSOMNIA: Primary | ICD-10-CM

## 2022-02-07 DIAGNOSIS — I10 ESSENTIAL HYPERTENSION: ICD-10-CM

## 2022-02-07 DIAGNOSIS — E78.49 OTHER HYPERLIPIDEMIA: ICD-10-CM

## 2022-02-07 PROCEDURE — 99214 OFFICE O/P EST MOD 30 MIN: CPT | Performed by: FAMILY MEDICINE

## 2022-02-07 RX ORDER — PSEUDOEPHEDRINE HYDROCHLORIDE 60 MG/1
60 TABLET, FILM COATED ORAL 2 TIMES DAILY
Qty: 60 TABLET | Refills: 2 | Status: SHIPPED | OUTPATIENT
Start: 2022-02-07 | End: 2022-12-05 | Stop reason: SDUPTHER

## 2022-02-07 RX ORDER — ZOLPIDEM TARTRATE 10 MG/1
5 TABLET ORAL EVERY OTHER DAY
Qty: 30 TABLET | Refills: 0 | Status: SHIPPED | OUTPATIENT
Start: 2022-02-07 | End: 2022-05-12 | Stop reason: SDUPTHER

## 2022-02-08 NOTE — PROGRESS NOTES
"Mercedes Dale     VITALS: Blood pressure 130/76, pulse 72, temperature 97.1 °F (36.2 °C), height 165.1 cm (65\"), weight 83.1 kg (183 lb 3.2 oz), SpO2 99 %.    Subjective  Chief Complaint  Tinnitus    Subjective          History of Present Illness:  Patient is a 61 y.o.  female with medical conditions significant for atrial fibrillation, hypertension, and hyperlipidemia who presents to clinic for medical follow-up. She is complaining about tinnitus today.    The patient has consented to being recorded using LES.    The patient states that she was not able to get get laboratory studies due \"not being able to get it\" and she denies presenting to the \"[facility]\". She reports that she drinks 64 ounces of water daily. The patient reports urinary frequency approximately every 2 weeks in the evening; however, she feels this is \"bladder\". She reports she has ceased consuming tea.     The patient complains of tinnitus in her right ear when she had a significant sinus infection. She reports trying to put headphones in to reduce the tinnitus at night-time; however, this is ineffective. The patient reports her ear is intermittently itchy.     She reports that she has been trying taking Ambien one night and then the following night using a natural remedy for sleep; however, she reports nightmares. The patient adds she consulted with her pharmacist regarding the nightmares and she was informed she was \"taking the wrong one\". The patient also states that she wakes up during the night and she feels as if \"it wears off\"; however, she states she has always done this. She reports she never goes into what she takes deep sleep at all. The patient states that she normally goes to bed at 12:00 AM, or 12:30 AM. She endorses being in need of refills of Ambien.     No complaints about any of the medications.    The following portions of the patient's history were reviewed and updated as appropriate: allergies, current medications, past family " "history, past medical history, past social history, past surgical history and problem list.    Past Medical History  Past Medical History:   Diagnosis Date   • Abnormal heart rhythm    • Anxiety 9/1/2016   • Anxiety    • Back pain    • Constipation 9/1/2016   • Decreased libido    • Diverticulitis    • Heart palpitations    • Heartburn    • History of echocardiogram    • History of Holter monitoring    • History of stress test    • Hot flashes 9/1/2016   • Hyperlipidemia    • Hypertension 9/1/2016   • Insomnia 9/1/2016   • Mitral valve prolapse    • Reflux esophagitis    • Sinusitis    • GEORGE (stress urinary incontinence, female) 9/1/2016       Surgical History  Past Surgical History:   Procedure Laterality Date   • GALLBLADDER SURGERY  2012   • KNEE ARTHROSCOPY      scope twice   • LAPAROSCOPIC ASSISTED VAGINAL HYSTERECTOMY SALPINGO OOPHORECTOMY      51       Family History  Family History   Problem Relation Age of Onset   • Breast cancer Cousin    • Stroke Mother    • Dementia Mother    • Mitral valve prolapse Mother    • Heart attack Mother    • Hypertension Mother    • Cancer Mother    • COPD Mother    • Kidney disease Mother    • Heart attack Father    • Hypertension Sister    • Thyroid disease Sister    • Anxiety disorder Sister    • Depression Sister    • Diabetes Maternal Grandmother    • Diabetes Paternal Grandmother        Social History  Social History     Socioeconomic History   • Marital status:    • Number of children: 2   Tobacco Use   • Smoking status: Never Smoker   • Smokeless tobacco: Never Used   Vaping Use   • Vaping Use: Never used   Substance and Sexual Activity   • Alcohol use: No   • Drug use: No   • Sexual activity: Yes     Partners: Male     Birth control/protection: Surgical       Objective   Vital Signs:   /76 (BP Location: Right arm, Patient Position: Sitting, Cuff Size: Adult)   Pulse 72   Temp 97.1 °F (36.2 °C)   Ht 165.1 cm (65\")   Wt 83.1 kg (183 lb 3.2 oz)   SpO2 " 99%   BMI 30.49 kg/m²     Physical Exam     Gen: Patient in NAD. Pleasant and answers appropriately. A&Ox3.    Skin: Warm and dry with normal turgor. No purpura, rashes, or unusual pigmentation noted. Hair is normal in appearance and distribution.    HEENT: NC/AT. No lesions noted. Conjunctiva clear, sclera nonicteric. PERRL. EOMI without nystagmus or strabismus. Fundi appear benign. No hemorrhages or exudates of eyes. Auditory canals are patent bilaterally without lesions. TMs intact,  nonerythematous,  bulging without lesions. Nasal mucosa erythematous, and nonedematous. Frontal and maxillary sinuses are nontender. O/P nonerythematous and moist without exudate.    Neck: Supple without lymph nodes palpated. FROM.     Lungs: CTA B/L without rales, rhonchi, crackles, or wheezes.    Heart: RRR. S1 and S2 normal. No S3 or S4. No MRGT.    Abd: Soft, nontender,nondistended. (+)BSx4 quadrants.     Extrem: No CCE. Radial pulses 2+/4 and equal B/L. FROMx4.     Neuro: No focal motor/sensory deficits.    Procedures       Assessment and Plan    Mercedes Dale is a 61 y.o. here for medical followup.    Problem List Items Addressed This Visit        Sleep    Insomnia    Relevant Medications    zolpidem (AMBIEN) 10 MG tablet        Diagnoses and all orders for this visit:    1. Other insomnia (Primary)  -     zolpidem (AMBIEN) 10 MG tablet; Take 0.5 tablets by mouth Every Other Day.  Dispense: 30 tablet; Refill: 0    2. Essential hypertension  Within normal limits today.  Patient to continue Cartia  mg orally daily.    3. Other hyperlipidemia  Continue Lipitor 10 mg orally daily.    Other orders  -     pseudoephedrine (SUDAFED) 60 MG tablet; Take 1 tablet by mouth 2 (Two) Times a Day.  Dispense: 60 tablet; Refill: 2  -     SCANNED - LABS          Patient's Body mass index is 30.49 kg/m². indicating that she is obese (BMI >30). Obesity-related health conditions include the following: hypertension. Obesity is unchanged. BMI  is is above average; BMI management plan is completed. We discussed portion control and increasing exercise..                   Follow Up   Return in about 3 months (around 5/7/2022).  Findings and plans discussed with patient who verbalizes understanding and agreement. Will followup with patient once results are in. Patient was given instructions and counseling regarding her condition or for health maintenance advice. Please see specific information pulled into the AVS if appropriate.       Transcribed from ambient dictation for Jessica Graham MD by Cece Alonso.  02/08/22   08:17 EST    Patient verbalized consent to the visit recording.  I have personally performed the services described in this document as transcribed by the above individual, and it is both accurate and complete.  Jessica Graham MD  2/28/2022  05:44 EST

## 2022-03-07 RX ORDER — APIXABAN 5 MG/1
TABLET, FILM COATED ORAL
Qty: 60 TABLET | Refills: 5 | Status: SHIPPED | OUTPATIENT
Start: 2022-03-07 | End: 2022-09-06

## 2022-03-07 NOTE — TELEPHONE ENCOUNTER
Lab Results   Component Value Date    GLUCOSE 84 08/06/2021    BUN 21 08/06/2021    CREATININE 1.19 (H) 08/06/2021    EGFRIFNONA 46 (L) 08/06/2021    BCR 17.6 08/06/2021    K 4.0 08/06/2021    CO2 24.8 08/06/2021    CALCIUM 9.5 08/06/2021    ALBUMIN 4.22 08/06/2021    AST 27 08/06/2021    ALT 26 08/06/2021    Next appt 7/2022

## 2022-05-09 ENCOUNTER — LAB (OUTPATIENT)
Dept: LAB | Facility: HOSPITAL | Age: 62
End: 2022-05-09

## 2022-05-09 PROCEDURE — 36415 COLL VENOUS BLD VENIPUNCTURE: CPT | Performed by: FAMILY MEDICINE

## 2022-05-09 PROCEDURE — 80053 COMPREHEN METABOLIC PANEL: CPT | Performed by: FAMILY MEDICINE

## 2022-05-10 LAB
ALBUMIN SERPL-MCNC: 4.3 G/DL (ref 3.5–5.2)
ALBUMIN/GLOB SERPL: 1.2 G/DL
ALP SERPL-CCNC: 60 U/L (ref 39–117)
ALT SERPL W P-5'-P-CCNC: 38 U/L (ref 1–33)
ANION GAP SERPL CALCULATED.3IONS-SCNC: 14.2 MMOL/L (ref 5–15)
AST SERPL-CCNC: 26 U/L (ref 1–32)
BILIRUB SERPL-MCNC: 0.5 MG/DL (ref 0–1.2)
BUN SERPL-MCNC: 15 MG/DL (ref 8–23)
BUN/CREAT SERPL: 13.5 (ref 7–25)
CALCIUM SPEC-SCNC: 9.3 MG/DL (ref 8.6–10.5)
CHLORIDE SERPL-SCNC: 104 MMOL/L (ref 98–107)
CO2 SERPL-SCNC: 20.8 MMOL/L (ref 22–29)
CREAT SERPL-MCNC: 1.11 MG/DL (ref 0.57–1)
EGFRCR SERPLBLD CKD-EPI 2021: 56.7 ML/MIN/1.73
GLOBULIN UR ELPH-MCNC: 3.7 GM/DL
GLUCOSE SERPL-MCNC: 77 MG/DL (ref 65–99)
POTASSIUM SERPL-SCNC: 4 MMOL/L (ref 3.5–5.2)
PROT SERPL-MCNC: 8 G/DL (ref 6–8.5)
SODIUM SERPL-SCNC: 139 MMOL/L (ref 136–145)

## 2022-05-12 ENCOUNTER — OFFICE VISIT (OUTPATIENT)
Dept: FAMILY MEDICINE CLINIC | Facility: CLINIC | Age: 62
End: 2022-05-12

## 2022-05-12 VITALS
WEIGHT: 181.2 LBS | HEART RATE: 64 BPM | SYSTOLIC BLOOD PRESSURE: 130 MMHG | OXYGEN SATURATION: 97 % | BODY MASS INDEX: 30.19 KG/M2 | DIASTOLIC BLOOD PRESSURE: 84 MMHG | TEMPERATURE: 97.7 F | HEIGHT: 65 IN

## 2022-05-12 DIAGNOSIS — I48.0 PAROXYSMAL ATRIAL FIBRILLATION: ICD-10-CM

## 2022-05-12 DIAGNOSIS — G47.09 OTHER INSOMNIA: ICD-10-CM

## 2022-05-12 DIAGNOSIS — I10 ESSENTIAL HYPERTENSION: Primary | ICD-10-CM

## 2022-05-12 DIAGNOSIS — E78.49 OTHER HYPERLIPIDEMIA: ICD-10-CM

## 2022-05-12 PROCEDURE — 99214 OFFICE O/P EST MOD 30 MIN: CPT | Performed by: FAMILY MEDICINE

## 2022-05-12 RX ORDER — DILTIAZEM HYDROCHLORIDE 240 MG/1
240 CAPSULE, EXTENDED RELEASE ORAL DAILY
Qty: 90 CAPSULE | Refills: 1 | Status: SHIPPED | OUTPATIENT
Start: 2022-05-12 | End: 2022-07-06

## 2022-05-12 RX ORDER — ATORVASTATIN CALCIUM 10 MG/1
10 TABLET, FILM COATED ORAL DAILY
Qty: 90 TABLET | Refills: 1 | Status: SHIPPED | OUTPATIENT
Start: 2022-05-12 | End: 2022-08-02

## 2022-05-12 RX ORDER — ZOLPIDEM TARTRATE 5 MG/1
5 TABLET ORAL EVERY OTHER DAY
Qty: 15 TABLET | Refills: 0 | Status: SHIPPED | OUTPATIENT
Start: 2022-05-12 | End: 2022-06-06 | Stop reason: SDUPTHER

## 2022-05-12 NOTE — PROGRESS NOTES
"Mercedes Dale     VITALS: Blood pressure 130/84, pulse 64, temperature 97.7 °F (36.5 °C), height 165.1 cm (65\"), weight 82.2 kg (181 lb 3.2 oz), SpO2 97 %.    Subjective  Chief Complaint  Medication Problem    Subjective          History of Present Illness:  Patient is a 61 y.o.  female with medical conditions significant for atrial fibrillation, hypertension, and hyperlipidemia who presents to clinic secondary to medical followup.     The patient states that she has been experiencing sneezing, coughing, and drainage. She reports that she feels like she is falling apart. She reports that she has started exercising. She reports that she walks anywhere from 15,000 to 17,000 steps per day. She reports that she is not jogging or running. She reports that she is just walking for 30 minutes. She reports that if she gets to where she feels like she builds up, she will jog one length of the tennis court to get her heart rate up. She reports that she did not feel 61 until now. She reports that her feet will swell. She reports that last night after she had done her walking, she went in and sat down and cooled off and drank 2 to 3 glasses of water. She reports that she got ready to get up and she could barely walk. She reports that it was almost like her foot would not bend. She reports that she got really sick in 02/2022. She reports that she went to urgent care over that weekend. She reports that she had the flu 30 years ago. She reports that she had her grandchild and he vomited. She reports that she started vomiting and having diarrhea at the same time. She reports that she was so sick that her  was holding her up. One of her tenants work for urgent care and she contacted her and she was told that she would get her an appointment. She reports that she had a lot of congestion. She reports that she was dehydrated. She states that she was told to start drinking Pedialyte in mass doses to get some nutrients into her " "because she did not want to eat. She reports that she still does not have the urge to eat. She reports that she dropped down to 176 pounds. She reports that when she started eating again, all she wanted was sweets, but she still does not have the hunger feeling. She reports that she can care less if she eats. She reports that the next day, she had fever blisters that broke out so bad that they gave her a cream. She reports that she laid around for almost 7 to 10 days. She reports that she would drink some bone broth. She reports that she is drinking 64 ounces of water per day. She states that when she was sick, she had diarrhea and vomiting for about 5-1/2 hours then it stopped.  At 2:00 AM, it started again and ended around 3:30 AM.  She states that the doctor wanted her to stop her Protonix. She reports she has not had issues with her acid reflux since stopping the Protonix.    She reports that her ear got worse. She reports that the tinnitus has not gone away. She  sees how people cannot take tinnitus.  When she goes to bed at night she listens to music to try to help with the tinnitus.  She feels that taking Sudafed did help her ear. She states the only thing that helps her sleep is Ambien.  The patient states that when she takes the melatonin, she does not sleep at all. She states that she has tried Xanax with melatonin, but it only made her groggy and anxious. The patient will be having eye surgery with Dr. Casey Cox in [place]. She is having excess skin removed that is pushing down on her eyelids.  She is waiting for clearance from her cardiologist before having surgery.  She states that she had glaucoma testing this week and has \"a little glaucoma\".  The patient reports she has also been experiencing nail peeling. The patient has a nodule on her hand that may be arthritis.     No complaints about any of the medications.    The following portions of the patient's history were reviewed and updated as " appropriate: allergies, current medications, past family history, past medical history, past social history, past surgical history and problem list.    Past Medical History  Past Medical History:   Diagnosis Date   • Abnormal heart rhythm    • Anxiety 09/01/2016   • Anxiety    • Back pain    • Constipation 09/01/2016   • Decreased libido    • Diverticulitis    • Heart palpitations    • Heartburn    • History of echocardiogram    • History of Holter monitoring    • History of stress test    • Hot flashes 09/01/2016   • Hyperlipidemia    • Hypertension 09/01/2016   • Insomnia 09/01/2016   • Mitral valve prolapse    • Other specified glaucoma    • Reflux esophagitis    • Sinusitis    • GEORGE (stress urinary incontinence, female) 09/01/2016       Surgical History  Past Surgical History:   Procedure Laterality Date   • GALLBLADDER SURGERY  2012   • KNEE ARTHROSCOPY      scope twice   • LAPAROSCOPIC ASSISTED VAGINAL HYSTERECTOMY SALPINGO OOPHORECTOMY      51       Family History  Family History   Problem Relation Age of Onset   • Breast cancer Cousin    • Stroke Mother    • Dementia Mother    • Mitral valve prolapse Mother    • Heart attack Mother    • Hypertension Mother    • Cancer Mother    • COPD Mother    • Kidney disease Mother    • Heart attack Father    • Hypertension Sister    • Thyroid disease Sister    • Anxiety disorder Sister    • Depression Sister    • Diabetes Maternal Grandmother    • Diabetes Paternal Grandmother        Social History  Social History     Socioeconomic History   • Marital status:    • Number of children: 2   Tobacco Use   • Smoking status: Never Smoker   • Smokeless tobacco: Never Used   Vaping Use   • Vaping Use: Never used   Substance and Sexual Activity   • Alcohol use: No   • Drug use: No   • Sexual activity: Yes     Partners: Male     Birth control/protection: Surgical       Objective   Vital Signs:   /84 (BP Location: Right arm, Patient Position: Sitting, Cuff Size: Adult)  "  Pulse 64   Temp 97.7 °F (36.5 °C)   Ht 165.1 cm (65\")   Wt 82.2 kg (181 lb 3.2 oz)   SpO2 97%   BMI 30.15 kg/m²     Physical Exam     Gen: Patient in NAD. Pleasant and answers appropriately. A&Ox3.    Skin: Warm and dry with normal turgor. No purpura, rashes, or unusual pigmentation noted. Hair is normal in appearance and distribution.    HEENT: NC/AT. No lesions noted. Conjunctiva clear, sclera nonicteric. PERRL. EOMI without nystagmus or strabismus. Fundi appear benign. No hemorrhages or exudates of eyes. Auditory canals are patent bilaterally without lesions. TMs intact,  nonerythematous, nonbulging without lesions. Nasal mucosa erythematous, and nonedematous. Frontal and maxillary sinuses are nontender. O/P erythematous and moist without exudate.    Neck: Supple without lymph nodes palpated. FROM.     Lungs: CTA B/L without rales, rhonchi, crackles, or wheezes.    Heart: Irregularly irregular. S1 and S2 normal. No S3 or S4. No MRGT.    Abd: Soft, nontender,nondistended. (+)BSx4 quadrants.     Extrem: No CCE. Radial pulses 2+/4 and equal B/L. FROMx4. No bone, joint, or muscle tenderness noted.    Neuro: No focal motor/sensory deficits.    Procedures       Assessment and Plan    Mercedes Dale is a 61 y.o. here for medical followup.    Problem List Items Addressed This Visit        Cardiac and Vasculature    Hyperlipidemia, unspecified    Relevant Medications    atorvastatin (LIPITOR) 10 MG tablet       Sleep    Insomnia    Relevant Medications    zolpidem (AMBIEN) 5 MG tablet      Other Visit Diagnoses     Essential hypertension        Relevant Medications    Cartia  MG 24 hr capsule         Paroxysmal atrial fibrillation            She will continue taking Eliquis as prescribed.          A mammogram will be ordered by her gynecologist.         BMI is >= 30 and <= 34.9 (Class 1 obesity). The following options were offered after discussion: exercise counseling/recommendations and nutrition " counseling/recommendations                 Follow Up   Return in about 3 months (around 8/12/2022).  Findings and plans discussed with patient who verbalizes understanding and agreement. Will followup with patient once results are in. Patient was given instructions and counseling regarding her condition or for health maintenance advice. Please see specific information pulled into the AVS if appropriate.       Transcribed from ambient dictation for Jessica Graham MD by Delmis Easley.  05/12/22   11:47 EDT    Patient verbalized consent to the visit recording.

## 2022-06-01 ENCOUNTER — OFFICE VISIT (OUTPATIENT)
Dept: OBSTETRICS AND GYNECOLOGY | Facility: CLINIC | Age: 62
End: 2022-06-01

## 2022-06-01 VITALS — WEIGHT: 181.8 LBS | SYSTOLIC BLOOD PRESSURE: 110 MMHG | DIASTOLIC BLOOD PRESSURE: 80 MMHG | BODY MASS INDEX: 30.25 KG/M2

## 2022-06-01 DIAGNOSIS — Z92.89 HISTORY OF DIAGNOSTIC MAMMOGRAPHY: Primary | ICD-10-CM

## 2022-06-01 DIAGNOSIS — N95.1 MENOPAUSAL SYMPTOMS: ICD-10-CM

## 2022-06-01 DIAGNOSIS — R92.8 ABNORMALITY OF LEFT BREAST ON SCREENING MAMMOGRAM: ICD-10-CM

## 2022-06-01 PROBLEM — M15.9 PRIMARY OSTEOARTHRITIS INVOLVING MULTIPLE JOINTS: Status: ACTIVE | Noted: 2022-06-01

## 2022-06-01 PROBLEM — Z01.419 WELL WOMAN EXAM WITH ROUTINE GYNECOLOGICAL EXAM: Status: ACTIVE | Noted: 2022-06-01

## 2022-06-01 PROBLEM — Z01.419 WELL WOMAN EXAM: Status: ACTIVE | Noted: 2022-06-01

## 2022-06-01 PROBLEM — M15.0 PRIMARY OSTEOARTHRITIS INVOLVING MULTIPLE JOINTS: Status: ACTIVE | Noted: 2022-06-01

## 2022-06-01 PROCEDURE — 99396 PREV VISIT EST AGE 40-64: CPT | Performed by: NURSE PRACTITIONER

## 2022-06-01 RX ORDER — LATANOPROST 50 UG/ML
1 SOLUTION/ DROPS OPHTHALMIC NIGHTLY
COMMUNITY
Start: 2022-05-24 | End: 2022-08-12

## 2022-06-01 RX ORDER — ESTRADIOL 0.5 MG/1
0.5 TABLET ORAL DAILY
Qty: 90 TABLET | Refills: 3 | Status: SHIPPED | OUTPATIENT
Start: 2022-06-01

## 2022-06-01 NOTE — PROGRESS NOTES
Subjective   Chief Complaint   Patient presents with   • Annual Exam     No c/c     Mercedes Dale is a 61 y.o. year old  who is post-menopausal.  She is S/P hysterectomy presenting to be seen for her annual exam.  This past year she has been on hormone replacement therapy.  There has not been vaginal bleeding in the last 12 months.  Menopausal symptoms have not been present until she starting taking half of her estradiol as recommended by her pcp, she now has occasional hot flashes. .She does reports GEORGE. She has done pelvic floor physical therapy in past with good results. She had pre op visit with MD at  for bladder tack however decided against surgery. She is due for her follow up diagnostic mammogram for previously birads 3 left breast mammogram. She is due for bilateral screening in Nov. She had a colonoscopy 3 years ago with recommendation to repeat in 5 years.   SEXUAL Hx:  She is currently sexually active.  In the past year there there has been NO new sexual partners.    Condoms are never used.  She would not like to be screened for STD's at today's exam.  Gardner is painful: no  HEALTH Hx:  She exercises regularly: yes.  She wears her seat belt: yes.  She has concerns about domestic violence: no.  OTHER THINGS SHE WANTS TO DISCUSS TODAY:  Nothing else    The following portions of the patient's history were reviewed and updated as appropriate:problem list, current medications, allergies, past family history, past medical history, past social history and past surgical history.    Social History    Tobacco Use      Smoking status: Never Smoker      Smokeless tobacco: Never Used    Review of Systems  Constitutional POS: nothing reported    NEG: anorexia or night sweats   Genitourinary POS: GEORGE is present but it IS NOT effecting her ADL's    NEG: dysuria or hematuria   Gastointestinal POS: nothing reported    NEG: bloating, change in bowel habits, melena or reflux symptoms   Integument POS:  nothing reported    NEG: moles that are changing in size, shape, color or rashes   Breast POS: nothing reported    NEG: persistent breast lump, skin dimpling or nipple discharge        Objective   /80 (BP Location: Right arm, Patient Position: Sitting, Cuff Size: Adult)   Wt 82.5 kg (181 lb 12.8 oz)   Breastfeeding No   BMI 30.25 kg/m²     General:  well developed; well nourished  no acute distress   Skin:  No suspicious lesions seen   Thyroid: normal to inspection and palpation   Breasts:  Examined in supine position  Symmetric without masses or skin dimpling  Nipples normal without inversion, lesions or discharge  There are no palpable axillary nodes   Abdomen: soft, non-tender; no masses  no umbilical or inguinal hernias are present  no hepato-splenomegaly   Pelvis: Clinical staff was present for exam  External genitalia:  normal appearance of the external genitalia including Bartholin's and Broxton's glands. left labia with varicosities noted  :  urethral meatus normal;  Vaginal:  normal pink mucosa without prolapse or lesions.  Cervix:  absent.  Uterus:  absent.  Adnexa:  non palpable bilaterally.  Rectal:  digital rectal exam not performed; anus visually normal appearing.        Assessment   1. Well woman gynecological exam  2. She is up to date on all relevant gynecologic and colorectal screenings except she needs her 6 month follow up diagnostic left breast mammogram     Plan   Pap was not done today.  I explained to Mercedes that the Pap smears are no longer recommended in patient's after hysterectomy.   I stressed to Mercedes that she still should be seen to be seen yearly for a full physical including breast and pelvic exam.  1. She was encouraged to get mammograms at the interval determined by the mammography center until cleared for annual mammogrpahy.  She should report any palpable breast lump(s) or skin changes regardless of mammographic findings.  I explained to Mercedes that notification  regarding her mammogram results will come from the center performing the study.  Our office will not be routinely calling with mammogram results.  It is her responsibility to make sure that the results from the mammogram are communicated to her by the breast center.  If she has any questions about the results, she is welcome to call our office anytime.  2. Refill of estradiol sent to pharmacy, discussed if hot flashes worsen or persist increase does to 0.5mg daily from the 0.25mg  3. The importance of keeping all planned follow-up and taking all medications as prescribed was emphasized.  4. Follow up for annual exam 1 year    No orders of the defined types were placed in this encounter.         This note was electronically signed.  Savannah Mast, DARIO  June 1, 2022

## 2022-06-03 ENCOUNTER — PATIENT ROUNDING (BHMG ONLY) (OUTPATIENT)
Dept: OBSTETRICS AND GYNECOLOGY | Facility: CLINIC | Age: 62
End: 2022-06-03

## 2022-06-03 NOTE — PROGRESS NOTES
A TAPP message has been sent to the patient for PATIENT ROUNDING with [facility].

## 2022-06-06 DIAGNOSIS — G47.09 OTHER INSOMNIA: ICD-10-CM

## 2022-06-10 RX ORDER — ZOLPIDEM TARTRATE 5 MG/1
5 TABLET ORAL EVERY OTHER DAY
Qty: 15 TABLET | Refills: 0 | Status: SHIPPED | OUTPATIENT
Start: 2022-06-10 | End: 2022-07-06 | Stop reason: SDUPTHER

## 2022-06-10 NOTE — TELEPHONE ENCOUNTER
Etelvina # in epic  Reviewed and is consistent.  UDS 2/2022 reviewed and is consistent.  Patient comfort assessment guide reviewed and updated today.    As part of the patient's treatment plan they are being prescribed a controlled substance/ substances with potential for abuse.  This patient has been made aware of the appropriate use of such medications, including potential risk of somnolence, limited ability to drive and/or work safely, and potential for overdose.  It has also been made clear these medications are for use by the patient only, without concomitant use of alcohol or other substances unless prescribed/advised by medical provider.    Patient has completed prescribing agreement detailing terms of continued prescribing of controlled substances including monitoring ETELVINA reports, urine drug screens, and pill counts.  The patient is aware ETELVINA reports are reviewed on a regular basis and scanned into the chart.    History and physical exam exhibit continued safe and appropriate use of controlled substances.

## 2022-06-21 ENCOUNTER — HOSPITAL ENCOUNTER (OUTPATIENT)
Dept: MAMMOGRAPHY | Facility: HOSPITAL | Age: 62
Discharge: HOME | End: 2022-06-21
Admitting: NURSE PRACTITIONER

## 2022-06-21 DIAGNOSIS — R92.8 ABNORMALITY OF LEFT BREAST ON SCREENING MAMMOGRAM: ICD-10-CM

## 2022-06-21 PROCEDURE — 77065 DX MAMMO INCL CAD UNI: CPT

## 2022-06-21 PROCEDURE — 77061 BREAST TOMOSYNTHESIS UNI: CPT | Performed by: RADIOLOGY

## 2022-06-21 PROCEDURE — 77065 DX MAMMO INCL CAD UNI: CPT | Performed by: RADIOLOGY

## 2022-06-21 PROCEDURE — G0279 TOMOSYNTHESIS, MAMMO: HCPCS

## 2022-06-23 ENCOUNTER — OFFICE VISIT (OUTPATIENT)
Dept: FAMILY MEDICINE CLINIC | Facility: CLINIC | Age: 62
End: 2022-06-23

## 2022-06-23 VITALS
HEIGHT: 65 IN | TEMPERATURE: 97.3 F | WEIGHT: 179.4 LBS | BODY MASS INDEX: 29.89 KG/M2 | OXYGEN SATURATION: 99 % | DIASTOLIC BLOOD PRESSURE: 60 MMHG | SYSTOLIC BLOOD PRESSURE: 126 MMHG | HEART RATE: 63 BPM | RESPIRATION RATE: 16 BRPM

## 2022-06-23 DIAGNOSIS — J06.9 VIRAL URI WITH COUGH: Primary | ICD-10-CM

## 2022-06-23 DIAGNOSIS — J20.9 ACUTE BRONCHITIS, UNSPECIFIED ORGANISM: ICD-10-CM

## 2022-06-23 LAB
EXPIRATION DATE: NORMAL
EXPIRATION DATE: NORMAL
FLUAV AG UPPER RESP QL IA.RAPID: NOT DETECTED
FLUBV AG UPPER RESP QL IA.RAPID: NOT DETECTED
INTERNAL CONTROL: NORMAL
INTERNAL CONTROL: NORMAL
Lab: NORMAL
Lab: NORMAL
S PYO AG THROAT QL: NEGATIVE
SARS-COV-2 AG UPPER RESP QL IA.RAPID: NOT DETECTED

## 2022-06-23 PROCEDURE — 99213 OFFICE O/P EST LOW 20 MIN: CPT | Performed by: NURSE PRACTITIONER

## 2022-06-23 PROCEDURE — 87428 SARSCOV & INF VIR A&B AG IA: CPT | Performed by: NURSE PRACTITIONER

## 2022-06-23 PROCEDURE — 87880 STREP A ASSAY W/OPTIC: CPT | Performed by: NURSE PRACTITIONER

## 2022-06-23 RX ORDER — BENZONATATE 100 MG/1
200 CAPSULE ORAL 3 TIMES DAILY PRN
Qty: 30 CAPSULE | Refills: 0 | Status: SHIPPED | OUTPATIENT
Start: 2022-06-23 | End: 2022-06-28

## 2022-06-23 RX ORDER — ALBUTEROL SULFATE 90 UG/1
2 AEROSOL, METERED RESPIRATORY (INHALATION) EVERY 6 HOURS PRN
Qty: 18 G | Refills: 0 | Status: SHIPPED | OUTPATIENT
Start: 2022-06-23 | End: 2022-09-28

## 2022-06-23 NOTE — PROGRESS NOTES
Chief Complaint  Cough, Nasal Congestion, Generalized Body Aches, Chills, and Fatigue    Subjective          Mercedes Dale is a 61 y.o. female who presents today to [facility] for initial evaluation     HPI:   History of Present Illness    Presents to clinic for complaint of cough, congestion, fatigue, body aches, chills  Symptoms started: 2 days ago  Associated symptoms: low grade fever, headache.  said she was wheezing last night.   Treatments: mucinex last night, vitamin c   Grandson tested positive for a virus a few weeks ago.     The following portions of the patient's history were reviewed and updated as appropriate: allergies, current medications, past family history, past medical history, past social history, past surgical history and problem list.    Objective     Allergy:   Allergies   Allergen Reactions   • Codeine Nausea And Vomiting   • Hydrocodone Nausea And Vomiting   • Morphine Nausea And Vomiting   • Penicillins Other (See Comments)     Intolerance   • Adhesive Tape Itching        Current Medications:   Current Outpatient Medications   Medication Sig Dispense Refill   • ALPRAZolam (XANAX) 1 MG tablet 1 mg As Needed. 1/2 TABLET AS NEEDED     • atorvastatin (LIPITOR) 10 MG tablet Take 1 tablet by mouth Daily. 90 tablet 1   • Cartia  MG 24 hr capsule Take 1 capsule by mouth Daily. 90 capsule 1   • cetirizine (zyrTEC) 10 MG tablet Take 1 tablet by mouth Daily. (Patient taking differently: Take 10 mg by mouth As Needed.) 14 tablet 0   • Eliquis 5 MG tablet tablet TAKE ONE TABLET BY MOUTH EVERY 12 HOURS 60 tablet 5   • estradiol (ESTRACE) 0.5 MG tablet Take 1 tablet by mouth Daily. 90 tablet 3   • flecainide (TAMBOCOR) 50 MG tablet Take 1 tablet by mouth 2 (Two) Times a Day. 180 tablet 3   • latanoprost (XALATAN) 0.005 % ophthalmic solution      • Multiple Vitamins-Minerals (THRIVE FOR LIFE WOMENS) tablet Take 1 tablet by mouth Daily.     • Triamcinolone  Acetonide (Nasacort Allergy 24HR) 55 MCG/ACT nasal inhaler 2 sprays into the nostril(s) as directed by provider Daily. (Patient taking differently: 2 sprays into the nostril(s) as directed by provider As Needed.) 16.5 g 11   • zolpidem (AMBIEN) 5 MG tablet Take 1 tablet by mouth Every Other Day. 15 tablet 0   • albuterol sulfate  (90 Base) MCG/ACT inhaler Inhale 2 puffs Every 6 (Six) Hours As Needed for Wheezing or Shortness of Air. 18 g 0   • benzonatate (Tessalon Perles) 100 MG capsule Take 2 capsules by mouth 3 (Three) Times a Day As Needed for Cough for up to 5 days. 30 capsule 0   • pseudoephedrine (SUDAFED) 60 MG tablet Take 1 tablet by mouth 2 (Two) Times a Day. (Patient taking differently: Take 60 mg by mouth As Needed.) 60 tablet 2     No current facility-administered medications for this visit.       Past Medical History:  Past Medical History:   Diagnosis Date   • Abnormal heart rhythm    • Anxiety 09/01/2016   • Anxiety    • Back pain    • Constipation 09/01/2016   • Decreased libido    • Diverticulitis    • Heart palpitations    • Heartburn    • History of echocardiogram    • History of Holter monitoring    • History of stress test    • Hot flashes 09/01/2016   • Hyperlipidemia    • Hypertension 09/01/2016   • Insomnia 09/01/2016   • Mitral valve prolapse    • Other specified glaucoma    • Reflux esophagitis    • Sinusitis    • GEORGE (stress urinary incontinence, female) 09/01/2016       Past Surgical History:  Past Surgical History:   Procedure Laterality Date   • GALLBLADDER SURGERY  2012   • KNEE ARTHROSCOPY      scope twice   • LAPAROSCOPIC ASSISTED VAGINAL HYSTERECTOMY SALPINGO OOPHORECTOMY      51       Social History:  Social History     Socioeconomic History   • Marital status:    • Number of children: 2   Tobacco Use   • Smoking status: Never Smoker   • Smokeless tobacco: Never Used   Vaping Use   • Vaping Use: Never used   Substance and Sexual Activity   • Alcohol use: No   • Drug  "use: No   • Sexual activity: Yes     Partners: Male     Birth control/protection: Surgical       Family History:  Family History   Problem Relation Age of Onset   • Breast cancer Cousin    • Stroke Mother    • Dementia Mother    • Mitral valve prolapse Mother    • Heart attack Mother    • Hypertension Mother    • Cancer Mother    • COPD Mother    • Kidney disease Mother    • Heart attack Father    • Hypertension Sister    • Thyroid disease Sister    • Anxiety disorder Sister    • Depression Sister    • Diabetes Maternal Grandmother    • Diabetes Paternal Grandmother        Review of Systems:  Review of Systems   Gastrointestinal: Negative for nausea and vomiting.       Vital Signs:   /60 (BP Location: Right arm, Patient Position: Sitting, Cuff Size: Large Adult)   Pulse 63   Temp 97.3 °F (36.3 °C) (Temporal)   Resp 16   Ht 165.1 cm (65\")   Wt 81.4 kg (179 lb 6.4 oz)   SpO2 99%   BMI 29.85 kg/m²      Physical Exam:  Physical Exam  Vitals and nursing note reviewed.   Constitutional:       General: She is not in acute distress.     Appearance: Normal appearance. She is not ill-appearing or toxic-appearing.   HENT:      Head: Normocephalic.      Right Ear: Ear canal and external ear normal. A middle ear effusion is present.      Left Ear: Ear canal and external ear normal. A middle ear effusion is present.      Nose: Nose normal.      Mouth/Throat:      Lips: Pink.      Mouth: Mucous membranes are moist.      Pharynx: Oropharynx is clear.      Comments: + Cobblestoning  Eyes:      Conjunctiva/sclera: Conjunctivae normal.   Cardiovascular:      Rate and Rhythm: Normal rate and regular rhythm.      Heart sounds: Normal heart sounds.   Pulmonary:      Effort: Pulmonary effort is normal. No respiratory distress.      Breath sounds: Normal breath sounds.   Abdominal:      General: Bowel sounds are normal. There is no distension.      Palpations: Abdomen is soft.      Tenderness: There is no abdominal tenderness. "   Lymphadenopathy:      Cervical: No cervical adenopathy.   Skin:     General: Skin is warm and dry.      Capillary Refill: Capillary refill takes less than 2 seconds.      Coloration: Skin is not pale.   Neurological:      Mental Status: She is alert and oriented to person, place, and time.   Psychiatric:         Mood and Affect: Mood normal.         Behavior: Behavior normal.         Thought Content: Thought content normal.         Judgment: Judgment normal.              Assessment and Plan   Diagnoses and all orders for this visit:    1. Viral URI with cough (Primary)  -     POCT SARS-CoV-2 Antigen SONA + Flu  -     POCT rapid strep A  -     benzonatate (Tessalon Perles) 100 MG capsule; Take 2 capsules by mouth 3 (Three) Times a Day As Needed for Cough for up to 5 days.  Dispense: 30 capsule; Refill: 0    2. Acute bronchitis, unspecified organism  -     POCT SARS-CoV-2 Antigen SOAN + Flu  -     POCT rapid strep A  -     albuterol sulfate  (90 Base) MCG/ACT inhaler; Inhale 2 puffs Every 6 (Six) Hours As Needed for Wheezing or Shortness of Air.  Dispense: 18 g; Refill: 0    #1/2: Cough and deep breathe. Increase humidification and hydration. Regimen as above, PRN for symptom management.     Discussed possible differential diagnoses, testing, treatment, recommended non-pharmacological interventions, risks, warning signs to monitor for that would indicate need for follow-up in clinic or ER. If no improvement with these regimens or you have new or worsening symptoms follow-up. Patient verbalizes understanding and agreement with plan of care. Denies further needs or concerns.     Patient was given instructions and counseling regarding her condition and for health maintenance advised.    BMI is >= 25 and <30. (Overweight) The following options were offered after discussion;: weight loss educational material (shared in after visit summary), exercise counseling/recommendations and nutrition  counseling/recommendations       I spent 20 minutes caring for patient on this date of service. This time includes time spent by me in the following activities: preparing for the visit, reviewing tests, obtaining and/or reviewing a separately obtained history, performing a medically appropriate examination and/or evaluation, counseling and educating the patient/family/caregiver, ordering medications, tests, or procedures and documenting information in the medical record    Meds ordered during this visit:  New Medications Ordered This Visit   Medications   • benzonatate (Tessalon Perles) 100 MG capsule     Sig: Take 2 capsules by mouth 3 (Three) Times a Day As Needed for Cough for up to 5 days.     Dispense:  30 capsule     Refill:  0   • albuterol sulfate  (90 Base) MCG/ACT inhaler     Sig: Inhale 2 puffs Every 6 (Six) Hours As Needed for Wheezing or Shortness of Air.     Dispense:  18 g     Refill:  0       Patient Instructions:  Patient instructions given for the following visit diagnosis:    ICD-10-CM ICD-9-CM   1. Viral URI with cough  J06.9 465.9   2. Acute bronchitis, unspecified organism  J20.9 466.0       Follow Up   Return if symptoms worsen or fail to improve.        This document has been electronically signed by DARIO Dumas  June 23, 2022 12:00 EDT    Patient was given instructions and counseling regarding her condition or for health maintenance advice. Please see specific information pulled into the AVS if appropriate.     Part of this note may be an electronic transcription/translation of spoken language to printed text using the Dragon Dictation System.

## 2022-07-05 NOTE — PROGRESS NOTES
[facility]  Office Progress Note  Mercedes Dale  1960  [address] [place] [address]       Visit Date: 07/06/22    PCP: Jessica Graham MD  [address] [place] [address]    IDENTIFICATION: A 61 y.o. 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    PROBLEM LIST:   1. Chest pain  1. 8/21 exercise MPS: Exercise Cardiolite WNL.  LVEF >70%.   Expected = 6:55.  Actual = 8:45.   Low risk test.  2. Palpitations/paroxysmal atrial fibrillation  1. 9/5/18 echo: EF 60% mild AI  2. 9/18 Event monitor - short lived afib  3. HL  1. 9/10/18   HDL 40 LDL 88, on atorvastatin  2. 6/21 135/379/29/67  4. Hypertension  5. GERD      CC:   Chief Complaint   Patient presents with   • Chest Pain   • Paroxysmal atrial fibrillation       Allergies  Allergies   Allergen Reactions   • Codeine Nausea And Vomiting   • Hydrocodone Nausea And Vomiting   • Morphine Nausea And Vomiting   • Adhesive Tape Itching   • Penicillins Other (See Comments)     Intolerance       Current Medications    Current Outpatient Medications:   •  albuterol sulfate  (90 Base) MCG/ACT inhaler, Inhale 2 puffs Every 6 (Six) Hours As Needed for Wheezing or Shortness of Air., Disp: 18 g, Rfl: 0  •  ALPRAZolam (XANAX) 1 MG tablet, 1 mg As Needed. 1/2 TABLET AS NEEDED, Disp: , Rfl:   •  atorvastatin (LIPITOR) 10 MG tablet, Take 1 tablet by mouth Daily., Disp: 90 tablet, Rfl: 1  •  Cartia  MG 24 hr capsule, Take 1 capsule by mouth Daily., Disp: 90 capsule, Rfl: 1  •  cetirizine (zyrTEC) 10 MG tablet, Take 1 tablet by mouth Daily. (Patient taking differently: Take 10 mg by mouth As Needed.), Disp: 14 tablet, Rfl: 0  •  Eliquis 5 MG tablet tablet, TAKE ONE TABLET BY MOUTH EVERY 12 HOURS, Disp: 60 tablet, Rfl: 5  •  estradiol (ESTRACE) 0.5 MG tablet, Take 1 tablet by mouth Daily., Disp: 90 tablet, Rfl: 3  •  flecainide (TAMBOCOR) 50 MG tablet, Take 1 tablet by mouth 2 (Two) Times a Day., Disp: 180 tablet,  "Rfl: 3  •  guaiFENesin (MUCINEX) 600 MG 12 hr tablet, Take 1,200 mg by mouth Daily., Disp: , Rfl:   •  latanoprost (XALATAN) 0.005 % ophthalmic solution, Administer 1 drop to both eyes Every Night., Disp: , Rfl:   •  Multiple Vitamins-Minerals (THRIVE FOR LIFE WOMENS) tablet, Take 1 tablet by mouth Daily., Disp: , Rfl:   •  pseudoephedrine (SUDAFED) 60 MG tablet, Take 1 tablet by mouth 2 (Two) Times a Day. (Patient taking differently: Take 60 mg by mouth As Needed.), Disp: 60 tablet, Rfl: 2  •  Triamcinolone Acetonide (Nasacort Allergy 24HR) 55 MCG/ACT nasal inhaler, 2 sprays into the nostril(s) as directed by provider Daily. (Patient taking differently: 2 sprays into the nostril(s) as directed by provider As Needed.), Disp: 16.5 g, Rfl: 11  •  zolpidem (AMBIEN) 5 MG tablet, Take 1 tablet by mouth Every Other Day. (Patient taking differently: Take 5 mg by mouth Every Night.), Disp: 15 tablet, Rfl: 0      History of Present Illness   Mercedes Dale is a 61 y.o. year old female here for follow up.    Pt denies any chest pain, dyspnea, dyspnea on exertion, orthopnea, PND, palpitations, lower extremity edema, or claudication.  Follows her heart rate and rhythm on her apple watch.  She states she is only had 3 episodes of atrial fibrillation that she can tell.  She does not comment that she has had any symptomatic bradycardia.  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  Her daughter is getting  later this fall and is honeymooning in [facility]      OBJECTIVE:  Vitals:    07/06/22 1324   BP: 120/71   BP Location: Right arm   Patient Position: Sitting   Cuff Size: Adult   Pulse: (!) 49   SpO2: 98%   Weight: 81.2 kg (179 lb)   Height: 165.1 cm (65\")     Body mass index is 29.79 kg/m².    Constitutional:       Appearance: Healthy appearance. Not in distress.   Neck:      Vascular: No JVR. JVD normal.   Pulmonary:      Effort: Pulmonary effort is normal.      Breath sounds: Normal breath " sounds. No wheezing. No rhonchi. No rales.   Chest:      Chest wall: Not tender to palpatation.   Cardiovascular:      PMI at left midclavicular line. Normal rate. Regular rhythm. Normal S1. Normal S2.      Murmurs: There is no murmur.      No gallop. No click. No rub.   Pulses:     Intact distal pulses.   Edema:     Peripheral edema absent.   Abdominal:      General: Bowel sounds are normal.      Palpations: Abdomen is soft.      Tenderness: There is no abdominal tenderness.   Musculoskeletal: Normal range of motion.         General: No tenderness. Skin:     General: Skin is warm and dry.   Neurological:      General: No focal deficit present.      Mental Status: Alert and oriented to person, place and time.         Diagnostic Data:    ECG 12 Lead    Date/Time: 7/6/2022 1:46 PM  Performed by: Jon Ramirez MD  Authorized by: Jon Ramirez MD   Previous ECG: no previous ECG available  Rhythm: sinus bradycardia  BPM: 49    Clinical impression: abnormal EKG              ASSESSMENT:   Diagnosis Plan   1. Paroxysmal atrial fibrillation (HCC)     2. Primary hypertension         PLAN:  Paroxysmal A. fib [id number] 3 will decrease Cartia as she has asymptomatic bradycardia at current 120 mg daily.  Continue flecainide and Eliquis    Hypertension controlled on Cartia aforementioned we will decrease dosing    Patient will be acceptable cardiac risk for proposed eyelid surgery she would need to hold Eliquis 48 hours prior to such        Jon Ramirez MD, [facility]

## 2022-07-06 ENCOUNTER — OFFICE VISIT (OUTPATIENT)
Dept: CARDIOLOGY | Facility: CLINIC | Age: 62
End: 2022-07-06

## 2022-07-06 VITALS
BODY MASS INDEX: 29.82 KG/M2 | WEIGHT: 179 LBS | DIASTOLIC BLOOD PRESSURE: 71 MMHG | HEIGHT: 65 IN | HEART RATE: 49 BPM | SYSTOLIC BLOOD PRESSURE: 120 MMHG | OXYGEN SATURATION: 98 %

## 2022-07-06 DIAGNOSIS — I48.0 PAROXYSMAL ATRIAL FIBRILLATION: Primary | ICD-10-CM

## 2022-07-06 DIAGNOSIS — G47.09 OTHER INSOMNIA: ICD-10-CM

## 2022-07-06 DIAGNOSIS — I10 PRIMARY HYPERTENSION: ICD-10-CM

## 2022-07-06 DIAGNOSIS — I10 ESSENTIAL HYPERTENSION: ICD-10-CM

## 2022-07-06 PROCEDURE — 93000 ELECTROCARDIOGRAM COMPLETE: CPT | Performed by: INTERNAL MEDICINE

## 2022-07-06 PROCEDURE — 99214 OFFICE O/P EST MOD 30 MIN: CPT | Performed by: INTERNAL MEDICINE

## 2022-07-06 RX ORDER — GUAIFENESIN 600 MG/1
1200 TABLET, EXTENDED RELEASE ORAL DAILY
COMMUNITY
End: 2022-08-12

## 2022-07-06 RX ORDER — DILTIAZEM HYDROCHLORIDE 120 MG/1
120 CAPSULE, COATED, EXTENDED RELEASE ORAL DAILY
Qty: 30 CAPSULE | Refills: 11 | Status: SHIPPED | OUTPATIENT
Start: 2022-07-06 | End: 2022-07-27

## 2022-07-08 RX ORDER — ZOLPIDEM TARTRATE 5 MG/1
5 TABLET ORAL EVERY OTHER DAY
Qty: 15 TABLET | Refills: 0 | Status: SHIPPED | OUTPATIENT
Start: 2022-07-08 | End: 2022-08-12 | Stop reason: SDUPTHER

## 2022-07-27 ENCOUNTER — TELEPHONE (OUTPATIENT)
Dept: CARDIOLOGY | Facility: CLINIC | Age: 62
End: 2022-07-27

## 2022-07-27 RX ORDER — DILTIAZEM HYDROCHLORIDE 120 MG/1
120 CAPSULE, COATED, EXTENDED RELEASE ORAL DAILY
Qty: 90 CAPSULE | Refills: 3 | Status: SHIPPED | OUTPATIENT
Start: 2022-07-27

## 2022-07-27 NOTE — TELEPHONE ENCOUNTER
Diltiazem was sent to [facility] on 7/6/2022 by RYAN for 120 mg. Spoke with pt. Confirmed pharmacy. Resent today.

## 2022-07-27 NOTE — TELEPHONE ENCOUNTER
Patient called inquiring about the decreased dose of Cartia. She reports that it was not sent to the pharmacy. What dose would you like sent in?

## 2022-07-30 DIAGNOSIS — E78.49 OTHER HYPERLIPIDEMIA: ICD-10-CM

## 2022-08-02 RX ORDER — ATORVASTATIN CALCIUM 10 MG/1
10 TABLET, FILM COATED ORAL DAILY
Qty: 90 TABLET | Refills: 1 | Status: SHIPPED | OUTPATIENT
Start: 2022-08-02 | End: 2022-08-12 | Stop reason: SDUPTHER

## 2022-08-08 RX ORDER — FLECAINIDE ACETATE 50 MG/1
TABLET ORAL
Qty: 60 TABLET | Refills: 11 | Status: SHIPPED | OUTPATIENT
Start: 2022-08-08

## 2022-08-12 ENCOUNTER — OFFICE VISIT (OUTPATIENT)
Dept: FAMILY MEDICINE CLINIC | Facility: CLINIC | Age: 62
End: 2022-08-12

## 2022-08-12 VITALS
WEIGHT: 176.8 LBS | BODY MASS INDEX: 29.46 KG/M2 | OXYGEN SATURATION: 96 % | DIASTOLIC BLOOD PRESSURE: 82 MMHG | SYSTOLIC BLOOD PRESSURE: 126 MMHG | HEIGHT: 65 IN | HEART RATE: 71 BPM | TEMPERATURE: 97.1 F

## 2022-08-12 DIAGNOSIS — R79.89 ELEVATED SERUM CREATININE: ICD-10-CM

## 2022-08-12 DIAGNOSIS — G47.09 OTHER INSOMNIA: ICD-10-CM

## 2022-08-12 DIAGNOSIS — E78.49 OTHER HYPERLIPIDEMIA: ICD-10-CM

## 2022-08-12 DIAGNOSIS — Z20.822 CONTACT WITH AND (SUSPECTED) EXPOSURE TO COVID-19: Primary | ICD-10-CM

## 2022-08-12 LAB
EXPIRATION DATE: NORMAL
FLUAV AG UPPER RESP QL IA.RAPID: NOT DETECTED
FLUBV AG UPPER RESP QL IA.RAPID: NOT DETECTED
INTERNAL CONTROL: NORMAL
Lab: NORMAL
SARS-COV-2 AG UPPER RESP QL IA.RAPID: NOT DETECTED

## 2022-08-12 PROCEDURE — 99214 OFFICE O/P EST MOD 30 MIN: CPT | Performed by: FAMILY MEDICINE

## 2022-08-12 PROCEDURE — 87428 SARSCOV & INF VIR A&B AG IA: CPT | Performed by: FAMILY MEDICINE

## 2022-08-12 PROCEDURE — 80053 COMPREHEN METABOLIC PANEL: CPT | Performed by: FAMILY MEDICINE

## 2022-08-12 RX ORDER — ZOLPIDEM TARTRATE 5 MG/1
5 TABLET ORAL EVERY OTHER DAY
Qty: 15 TABLET | Refills: 0 | Status: SHIPPED | OUTPATIENT
Start: 2022-08-12 | End: 2022-09-07 | Stop reason: SDUPTHER

## 2022-08-12 RX ORDER — ATORVASTATIN CALCIUM 10 MG/1
10 TABLET, FILM COATED ORAL DAILY
Qty: 90 TABLET | Refills: 1 | Status: SHIPPED | OUTPATIENT
Start: 2022-08-12

## 2022-08-12 NOTE — PROGRESS NOTES
"Mercedes Dale     VITALS: Blood pressure 126/82, pulse 71, temperature 97.1 °F (36.2 °C), height 165.1 cm (65\"), weight 80.2 kg (176 lb 12.8 oz), SpO2 96 %, not currently breastfeeding.    Subjective  Chief Complaint  Hypertension    Subjective          History of Present Illness:  Patient is a 61 y.o.  female who presents to clinic secondary to medical follow-up. She was exposed to COVID-19 3 days ago. Her rapid influenza test and COVID-19 test are negative today.    The patient states that she is feeling well; although she does have a sore throat. She was exposed to someone on 08/09/10 who tested positive for COVID-19 on 08/10/2022. She does not need a home COVID-19 test at this time. The patient notes that she had a fever and felt cold at her previous visit that started in the lungs. She tested negative for streptococcus at that time. The patient affirms recently having low energy with a resting heart rate 44 to 46 beats per minute. She states that she was unable to stay focused on anything and she was not sleeping at all. She tried melatonin to help her sleep and states that it makes her non functional and groggy. The patient underwent a recent electrocardiogram and her cardiologist decreased her diltiazem medication. She noticed she was gaining more energy approximately 5 days after decreasing her diltiazem dosage. The patient's resting heart rate is now 50 to 70 beats per minute. She reports that she keeps a sleep journal that shows every time she gets up during the night. She is currently taking Ambien 5 mg one half tablet every night and she would like a refill. The patient reports that she is falling asleep earlier; although she does wake up throughout the night. She states that she has been sleeping elevated due to coughing, and notes waking up with a \"feeling\" in her spine and muscles when she woke up a couple of times last week. The patient's kidney function levels have been elevated. She is drinking " 96 ounces of water daily. She notes that her mother has a history kidney related concerns. The patient is taking atorvastatin; although she has been out of her medication for 2 weeks.     No complaints about any of the medications.    The following portions of the patient's history were reviewed and updated as appropriate: allergies, current medications, past family history, past medical history, past social history, past surgical history and problem list.    Past Medical History  Past Medical History:   Diagnosis Date   • Abnormal heart rhythm    • Anxiety 09/01/2016   • Anxiety    • Atrial fibrillation (HCC)    • Back pain    • Constipation 09/01/2016   • Decreased libido    • Diverticulitis    • Heart murmur    • Heart palpitations    • Heartburn    • History of echocardiogram    • History of Holter monitoring    • History of stress test    • Hot flashes 09/01/2016   • Hyperlipidemia    • Hypertension 09/01/2016   • Insomnia 09/01/2016   • Mitral valve prolapse    • Other specified glaucoma    • Reflux esophagitis    • Sinusitis    • GEORGE (stress urinary incontinence, female) 09/01/2016       Surgical History  Past Surgical History:   Procedure Laterality Date   • GALLBLADDER SURGERY  2012   • KNEE ARTHROSCOPY      scope twice   • LAPAROSCOPIC ASSISTED VAGINAL HYSTERECTOMY SALPINGO OOPHORECTOMY      51       Family History  Family History   Problem Relation Age of Onset   • Breast cancer Cousin    • Stroke Mother    • Dementia Mother    • Mitral valve prolapse Mother    • Heart attack Mother    • Hypertension Mother    • Cancer Mother    • COPD Mother    • Kidney disease Mother    • Heart attack Father    • Hypertension Sister    • Thyroid disease Sister    • Anxiety disorder Sister    • Depression Sister    • Diabetes Maternal Grandmother    • Diabetes Paternal Grandmother        Social History  Social History     Socioeconomic History   • Marital status:    • Number of children: 2   Tobacco Use   •  "Smoking status: Never Smoker   • Smokeless tobacco: Never Used   Vaping Use   • Vaping Use: Never used   Substance and Sexual Activity   • Alcohol use: No   • Drug use: No   • Sexual activity: Yes     Partners: Male     Birth control/protection: Surgical       Objective   Vital Signs:   /82 (BP Location: Right arm, Patient Position: Sitting, Cuff Size: Adult)   Pulse 71   Temp 97.1 °F (36.2 °C)   Ht 165.1 cm (65\")   Wt 80.2 kg (176 lb 12.8 oz)   SpO2 96%   BMI 29.42 kg/m²     Physical Exam     Gen: Patient in NAD. Pleasant and answers appropriately. A&Ox3.    Skin: Warm and dry with normal turgor. No purpura, rashes, or unusual pigmentation noted. Hair is normal in appearance and distribution.    HEENT: NC/AT. No lesions noted. Conjunctiva clear, sclera nonicteric. PERRL. EOMI without nystagmus or strabismus. Fundi appear benign. No hemorrhages or exudates of eyes. Auditory canals are patent bilaterally without lesions. TMs intact,  nonerythematous, nonbulging without lesions. Nasal mucosa pink, nonerythematous, and nonedematous. Frontal and maxillary sinuses are nontender. O/P nonerythematous and moist without exudate.    Neck: Supple without lymph nodes palpated. FROM.     Lungs: CTA B/L without rales, rhonchi, crackles, or wheezes.    Heart: RRR. S1 and S2 normal. No S3 or S4. No MRGT.    Abd: Soft, nontender,nondistended. (+)BSx4 quadrants.     Extrem: No CCE. Radial pulses 2+/4 and equal B/L. FROMx4. No bone, joint, or muscle tenderness noted.    Neuro: No focal motor/sensory deficits.    Procedures       Assessment and Plan    Mercedes Dale is a 61 y.o. here for medical followup.    Problem List Items Addressed This Visit        Cardiac and Vasculature    Hyperlipidemia, unspecified    Relevant Medications    atorvastatin (LIPITOR) 10 MG tablet  - Medication refill        Sleep    Insomnia    Relevant Medications    zolpidem (AMBIEN) 5 MG tablet  - She will continue taking Ambien.      Other " Visit Diagnoses     Contact with and (suspected) exposure to covid-19    -  Primary    Relevant Orders    POCT SARS-CoV-2 Antigen SONA + Flu (Completed)  - The patient will continue testing for COVID-19 at home.   - She will follow up if results are positive.     Elevated serum creatinine        Relevant Orders    Comprehensive Metabolic Panel  - I will obtain laboratory work             BMI is >= 25 and <30. (Overweight) The following options were offered after discussion;: exercise counseling/recommendations and nutrition counseling/recommendations                 Follow Up   Return in about 3 months (around 11/12/2022), or LABS.  Findings and plans discussed with patient who verbalizes understanding and agreement. Will followup with patient once results are in. Patient was given instructions and counseling regarding her condition or for health maintenance advice. Please see specific information pulled into the AVS if appropriate.     Jessica Graham MD    Transcribed from ambient dictation for Jessica Graham MD by Rebekah Bang.   08/12/22   13:18 EDT    Patient verbalized consent to the visit recording.  I have personally performed the services described in this document as transcribed by the above individual, and it is both accurate and complete.  Jessica Graham MD  8/29/2022  07:30 EDT

## 2022-08-13 LAB
ALBUMIN SERPL-MCNC: 4.6 G/DL (ref 3.5–5.2)
ALBUMIN/GLOB SERPL: 1.4 G/DL
ALP SERPL-CCNC: 58 U/L (ref 39–117)
ALT SERPL W P-5'-P-CCNC: 48 U/L (ref 1–33)
ANION GAP SERPL CALCULATED.3IONS-SCNC: 17.8 MMOL/L (ref 5–15)
AST SERPL-CCNC: 41 U/L (ref 1–32)
BILIRUB SERPL-MCNC: 0.6 MG/DL (ref 0–1.2)
BUN SERPL-MCNC: 16 MG/DL (ref 8–23)
BUN/CREAT SERPL: 12.7 (ref 7–25)
CALCIUM SPEC-SCNC: 9.8 MG/DL (ref 8.6–10.5)
CHLORIDE SERPL-SCNC: 101 MMOL/L (ref 98–107)
CO2 SERPL-SCNC: 21.2 MMOL/L (ref 22–29)
CREAT SERPL-MCNC: 1.26 MG/DL (ref 0.57–1)
EGFRCR SERPLBLD CKD-EPI 2021: 48.7 ML/MIN/1.73
GLOBULIN UR ELPH-MCNC: 3.3 GM/DL
GLUCOSE SERPL-MCNC: 100 MG/DL (ref 65–99)
POTASSIUM SERPL-SCNC: 5 MMOL/L (ref 3.5–5.2)
PROT SERPL-MCNC: 7.9 G/DL (ref 6–8.5)
SODIUM SERPL-SCNC: 140 MMOL/L (ref 136–145)

## 2022-08-15 ENCOUNTER — TELEPHONE (OUTPATIENT)
Dept: FAMILY MEDICINE CLINIC | Facility: CLINIC | Age: 62
End: 2022-08-15

## 2022-08-15 NOTE — TELEPHONE ENCOUNTER
----- Message from Jessica Graham MD sent at 8/14/2022  5:11 PM EDT -----  Please call Mercedes. Is she drinking enough water? Her kidney function is very slightly worse. 64 oz of fluid a day. Is she drinking pop or coffee?      Patient says that she is drinking one gallon of purified drinking water a day. Sometimes is two eight ounce glasses short some days. She does not drink coffee, and has had possibly two soft drinks in the last month. (she says she is not sure if this has anything to do with it but, She says once a week, she has an evening where she does nothing but urinate.)       Patient called back to say that she is having a lot of sugar intake in her foods. So she says her diet is not as good as it should be. She wants to know if that can play a role in the level?

## 2022-08-16 NOTE — TELEPHONE ENCOUNTER
Yes. I'll admit, I'm a little worried about it because she is so healthy otherwise. Have her work on her diet and please schedule her an appointment in a month - I want to recheck some labs and see where it is. Drink how she is drinking right now, no need to add more. Just change the diet. I might have to look at the medications or we might have to consult nephrology. Does she have a family history of kidney disease?

## 2022-08-16 NOTE — TELEPHONE ENCOUNTER
Patient notified and verbalized understanding, she is going to work on diet. Patient says her mother lost a kindney to cancer, and thinks her sister, Lizzette Anna, has some kidney issues as well.   Made a follow up appointment for 9/29/22.

## 2022-09-06 RX ORDER — APIXABAN 5 MG/1
TABLET, FILM COATED ORAL
Qty: 180 TABLET | Refills: 0 | Status: SHIPPED | OUTPATIENT
Start: 2022-09-06 | End: 2022-12-07

## 2022-09-07 DIAGNOSIS — G47.09 OTHER INSOMNIA: ICD-10-CM

## 2022-09-12 RX ORDER — ZOLPIDEM TARTRATE 5 MG/1
5 TABLET ORAL EVERY OTHER DAY
Qty: 15 TABLET | Refills: 0 | Status: SHIPPED | OUTPATIENT
Start: 2022-09-12 | End: 2022-10-11 | Stop reason: SDUPTHER

## 2022-09-28 ENCOUNTER — OFFICE VISIT (OUTPATIENT)
Dept: FAMILY MEDICINE CLINIC | Facility: CLINIC | Age: 62
End: 2022-09-28

## 2022-09-28 VITALS
BODY MASS INDEX: 29.46 KG/M2 | SYSTOLIC BLOOD PRESSURE: 128 MMHG | DIASTOLIC BLOOD PRESSURE: 82 MMHG | HEIGHT: 65 IN | TEMPERATURE: 97.1 F | WEIGHT: 176.8 LBS | OXYGEN SATURATION: 98 % | HEART RATE: 60 BPM

## 2022-09-28 DIAGNOSIS — F41.9 ANXIETY: ICD-10-CM

## 2022-09-28 DIAGNOSIS — N23 KIDNEY PAIN: Primary | ICD-10-CM

## 2022-09-28 DIAGNOSIS — E78.49 OTHER HYPERLIPIDEMIA: ICD-10-CM

## 2022-09-28 DIAGNOSIS — R00.2 PALPITATIONS: ICD-10-CM

## 2022-09-28 LAB
BILIRUB BLD-MCNC: NEGATIVE MG/DL
CLARITY, POC: CLEAR
COLOR UR: YELLOW
EXPIRATION DATE: ABNORMAL
GLUCOSE UR STRIP-MCNC: NEGATIVE MG/DL
KETONES UR QL: NEGATIVE
LEUKOCYTE EST, POC: NEGATIVE
Lab: ABNORMAL
NITRITE UR-MCNC: NEGATIVE MG/ML
PH UR: 5.5 [PH] (ref 5–8)
PROT UR STRIP-MCNC: ABNORMAL MG/DL
RBC # UR STRIP: ABNORMAL /UL
SP GR UR: 1.03 (ref 1–1.03)
UROBILINOGEN UR QL: NORMAL

## 2022-09-28 PROCEDURE — 84484 ASSAY OF TROPONIN QUANT: CPT | Performed by: FAMILY MEDICINE

## 2022-09-28 PROCEDURE — 99214 OFFICE O/P EST MOD 30 MIN: CPT | Performed by: FAMILY MEDICINE

## 2022-09-28 PROCEDURE — 80061 LIPID PANEL: CPT | Performed by: FAMILY MEDICINE

## 2022-09-28 PROCEDURE — 83735 ASSAY OF MAGNESIUM: CPT | Performed by: FAMILY MEDICINE

## 2022-09-28 PROCEDURE — 83036 HEMOGLOBIN GLYCOSYLATED A1C: CPT | Performed by: FAMILY MEDICINE

## 2022-09-28 PROCEDURE — 87086 URINE CULTURE/COLONY COUNT: CPT | Performed by: FAMILY MEDICINE

## 2022-09-28 PROCEDURE — 84443 ASSAY THYROID STIM HORMONE: CPT | Performed by: FAMILY MEDICINE

## 2022-09-28 PROCEDURE — 80053 COMPREHEN METABOLIC PANEL: CPT | Performed by: FAMILY MEDICINE

## 2022-09-28 PROCEDURE — 85025 COMPLETE CBC W/AUTO DIFF WBC: CPT | Performed by: FAMILY MEDICINE

## 2022-09-28 PROCEDURE — 81003 URINALYSIS AUTO W/O SCOPE: CPT | Performed by: FAMILY MEDICINE

## 2022-09-28 PROCEDURE — 93000 ELECTROCARDIOGRAM COMPLETE: CPT | Performed by: FAMILY MEDICINE

## 2022-09-28 RX ORDER — GLIMEPIRIDE 2 MG/1
1 TABLET ORAL 2 TIMES DAILY
COMMUNITY

## 2022-09-28 RX ORDER — BUSPIRONE HYDROCHLORIDE 5 MG/1
5 TABLET ORAL 3 TIMES DAILY PRN
Qty: 90 TABLET | Refills: 0 | Status: SHIPPED | OUTPATIENT
Start: 2022-09-28 | End: 2022-10-11

## 2022-09-29 ENCOUNTER — TELEPHONE (OUTPATIENT)
Dept: FAMILY MEDICINE CLINIC | Facility: CLINIC | Age: 62
End: 2022-09-29

## 2022-09-29 LAB
ALBUMIN SERPL-MCNC: 4.6 G/DL (ref 3.5–5.2)
ALBUMIN/GLOB SERPL: 1.3 G/DL
ALP SERPL-CCNC: 64 U/L (ref 39–117)
ALT SERPL W P-5'-P-CCNC: 42 U/L (ref 1–33)
ANION GAP SERPL CALCULATED.3IONS-SCNC: 14 MMOL/L (ref 5–15)
AST SERPL-CCNC: 37 U/L (ref 1–32)
BACTERIA SPEC AEROBE CULT: NO GROWTH
BASOPHILS # BLD AUTO: 0.03 10*3/MM3 (ref 0–0.2)
BASOPHILS NFR BLD AUTO: 0.4 % (ref 0–1.5)
BILIRUB SERPL-MCNC: 0.5 MG/DL (ref 0–1.2)
BUN SERPL-MCNC: 18 MG/DL (ref 8–23)
BUN/CREAT SERPL: 18.8 (ref 7–25)
CALCIUM SPEC-SCNC: 9.7 MG/DL (ref 8.6–10.5)
CHLORIDE SERPL-SCNC: 102 MMOL/L (ref 98–107)
CHOLEST SERPL-MCNC: 274 MG/DL (ref 0–200)
CO2 SERPL-SCNC: 23 MMOL/L (ref 22–29)
CREAT SERPL-MCNC: 0.96 MG/DL (ref 0.57–1)
DEPRECATED RDW RBC AUTO: 43.5 FL (ref 37–54)
EGFRCR SERPLBLD CKD-EPI 2021: 67.5 ML/MIN/1.73
EOSINOPHIL # BLD AUTO: 0.15 10*3/MM3 (ref 0–0.4)
EOSINOPHIL NFR BLD AUTO: 2.1 % (ref 0.3–6.2)
ERYTHROCYTE [DISTWIDTH] IN BLOOD BY AUTOMATED COUNT: 12.6 % (ref 12.3–15.4)
GLOBULIN UR ELPH-MCNC: 3.5 GM/DL
GLUCOSE SERPL-MCNC: 88 MG/DL (ref 65–99)
HBA1C MFR BLD: 5.5 % (ref 4.8–5.6)
HCT VFR BLD AUTO: 42.5 % (ref 34–46.6)
HDLC SERPL-MCNC: 39 MG/DL (ref 40–60)
HGB BLD-MCNC: 14.6 G/DL (ref 12–15.9)
LDLC SERPL CALC-MCNC: 166 MG/DL (ref 0–100)
LDLC/HDLC SERPL: 4.18 {RATIO}
LYMPHOCYTES # BLD AUTO: 2.4 10*3/MM3 (ref 0.7–3.1)
LYMPHOCYTES NFR BLD AUTO: 34.2 % (ref 19.6–45.3)
MAGNESIUM SERPL-MCNC: 2.6 MG/DL (ref 1.6–2.4)
MCH RBC QN AUTO: 32.4 PG (ref 26.6–33)
MCHC RBC AUTO-ENTMCNC: 34.4 G/DL (ref 31.5–35.7)
MCV RBC AUTO: 94.4 FL (ref 79–97)
MONOCYTES # BLD AUTO: 0.44 10*3/MM3 (ref 0.1–0.9)
MONOCYTES NFR BLD AUTO: 6.3 % (ref 5–12)
NEUTROPHILS NFR BLD AUTO: 3.99 10*3/MM3 (ref 1.7–7)
NEUTROPHILS NFR BLD AUTO: 56.9 % (ref 42.7–76)
PLATELET # BLD AUTO: 235 10*3/MM3 (ref 140–450)
PMV BLD AUTO: 13.4 FL (ref 6–12)
POTASSIUM SERPL-SCNC: 4.1 MMOL/L (ref 3.5–5.2)
PROT SERPL-MCNC: 8.1 G/DL (ref 6–8.5)
RBC # BLD AUTO: 4.5 10*6/MM3 (ref 3.77–5.28)
SODIUM SERPL-SCNC: 139 MMOL/L (ref 136–145)
TRIGL SERPL-MCNC: 360 MG/DL (ref 0–150)
TROPONIN T SERPL-MCNC: <0.01 NG/ML (ref 0–0.03)
TSH SERPL DL<=0.05 MIU/L-ACNC: 4.13 UIU/ML (ref 0.27–4.2)
VLDLC SERPL-MCNC: 69 MG/DL (ref 5–40)
WBC NRBC COR # BLD: 7.02 10*3/MM3 (ref 3.4–10.8)

## 2022-09-29 NOTE — TELEPHONE ENCOUNTER
----- Message from Jessica Graham MD sent at 9/29/2022 12:04 PM EDT -----  Was she fasting for this? The cholesterol is awfully high.          Patient states that she was fasting when the labs were done.     Patient reports that she had went off the atorvastatin. She does still have some if you want her to go back on that now.

## 2022-10-06 DIAGNOSIS — G47.09 OTHER INSOMNIA: ICD-10-CM

## 2022-10-06 RX ORDER — ZOLPIDEM TARTRATE 5 MG/1
5 TABLET ORAL EVERY OTHER DAY
Qty: 15 TABLET | Refills: 0 | Status: CANCELLED | OUTPATIENT
Start: 2022-10-06

## 2022-10-11 ENCOUNTER — OFFICE VISIT (OUTPATIENT)
Dept: FAMILY MEDICINE CLINIC | Facility: CLINIC | Age: 62
End: 2022-10-11

## 2022-10-11 VITALS
HEIGHT: 65 IN | TEMPERATURE: 97.5 F | HEART RATE: 67 BPM | SYSTOLIC BLOOD PRESSURE: 128 MMHG | DIASTOLIC BLOOD PRESSURE: 76 MMHG | BODY MASS INDEX: 29.26 KG/M2 | WEIGHT: 175.6 LBS | OXYGEN SATURATION: 100 %

## 2022-10-11 DIAGNOSIS — R35.0 URINE FREQUENCY: ICD-10-CM

## 2022-10-11 DIAGNOSIS — G47.09 OTHER INSOMNIA: ICD-10-CM

## 2022-10-11 DIAGNOSIS — F41.9 ANXIETY: ICD-10-CM

## 2022-10-11 DIAGNOSIS — N23 KIDNEY PAIN: ICD-10-CM

## 2022-10-11 DIAGNOSIS — R42 DIZZINESS: Primary | ICD-10-CM

## 2022-10-11 LAB
BILIRUB BLD-MCNC: ABNORMAL MG/DL
CLARITY, POC: CLEAR
COLOR UR: ABNORMAL
EXPIRATION DATE: ABNORMAL
GLUCOSE UR STRIP-MCNC: NEGATIVE MG/DL
KETONES UR QL: ABNORMAL
LEUKOCYTE EST, POC: ABNORMAL
Lab: ABNORMAL
NITRITE UR-MCNC: NEGATIVE MG/ML
PH UR: 5.5 [PH] (ref 5–8)
PROT UR STRIP-MCNC: ABNORMAL MG/DL
RBC # UR STRIP: NEGATIVE /UL
SP GR UR: 1.03 (ref 1–1.03)
UROBILINOGEN UR QL: NORMAL

## 2022-10-11 PROCEDURE — 81003 URINALYSIS AUTO W/O SCOPE: CPT | Performed by: FAMILY MEDICINE

## 2022-10-11 PROCEDURE — 99214 OFFICE O/P EST MOD 30 MIN: CPT | Performed by: FAMILY MEDICINE

## 2022-10-11 RX ORDER — MECLIZINE HYDROCHLORIDE 25 MG/1
25 TABLET ORAL 3 TIMES DAILY PRN
Qty: 90 TABLET | Refills: 0 | Status: SHIPPED | OUTPATIENT
Start: 2022-10-11 | End: 2022-12-05

## 2022-10-11 RX ORDER — ZOLPIDEM TARTRATE 5 MG/1
5 TABLET ORAL EVERY OTHER DAY
Qty: 15 TABLET | Refills: 0 | Status: SHIPPED | OUTPATIENT
Start: 2022-10-11 | End: 2022-11-08 | Stop reason: SDUPTHER

## 2022-10-11 NOTE — PROGRESS NOTES
"Mercedes Dale     VITALS: Blood pressure 128/76, pulse 67, temperature 97.5 °F (36.4 °C), height 165.1 cm (65\"), weight 79.7 kg (175 lb 9.6 oz), SpO2 100 %, not currently breastfeeding.    Subjective  Chief Complaint  Dizziness    Subjective          History of Present Illness:  Patient is a 61 y.o.  female with medical conditions significant for hyperlipidemia, atrial fibrillation, and insomnia who presents to clinic secondary to medical followup.     The patient states that she has been experiencing dizziness for approximately 2 weeks, which has progressively worsened. She denies having dizziness at her last visit. She reports that she was having sinus issues at her last visit; however, it was leaving. She states that she had a severe cold sore in her nose and currently has a scab that is almost healed. She reports that she has been taking BuSpar daily for approximately 2 weeks. She states that she feels like she is on a ship in the middle of the ocean and she is emotional again. She reports that she has not slept well. She states that she took 0.5 of a Xanax at night in the past. She has been using melatonin as well. She is having some kidney pain. She reports that she has been using Sudafed and Zyrtec for her allergies.     She states that she has been taking Benefiber and it makes her feel full. She reports that she has been drinking cranberry juice. She states that she was drinking 64 ounces of water; however, she has not been drinking it for the last couple of days.    She reports that she is having some kidney pain and drainage.    She is asking for a refill on her Ambien and meclizine.        No complaints about any of the medications.    The following portions of the patient's history were reviewed and updated as appropriate: allergies, current medications, past family history, past medical history, past social history, past surgical history and problem list.    Past Medical History  Past Medical " "History:   Diagnosis Date   • Abnormal heart rhythm    • Anxiety 09/01/2016   • Anxiety    • Atrial fibrillation (HCC)    • Back pain    • Constipation 09/01/2016   • Decreased libido    • Diverticulitis    • Heart murmur    • Heart palpitations    • Heartburn    • History of echocardiogram    • History of Holter monitoring    • History of stress test    • Hot flashes 09/01/2016   • Hyperlipidemia    • Hypertension 09/01/2016   • Insomnia 09/01/2016   • Mitral valve prolapse    • Other specified glaucoma    • Reflux esophagitis    • Sinusitis    • GEORGE (stress urinary incontinence, female) 09/01/2016       Surgical History  Past Surgical History:   Procedure Laterality Date   • GALLBLADDER SURGERY  2012   • KNEE ARTHROSCOPY      scope twice   • LAPAROSCOPIC ASSISTED VAGINAL HYSTERECTOMY SALPINGO OOPHORECTOMY      51       Family History  Family History   Problem Relation Age of Onset   • Breast cancer Cousin    • Stroke Mother    • Dementia Mother    • Mitral valve prolapse Mother    • Heart attack Mother    • Hypertension Mother    • Cancer Mother    • COPD Mother    • Kidney disease Mother    • Heart attack Father    • Hypertension Sister    • Thyroid disease Sister    • Anxiety disorder Sister    • Depression Sister    • Diabetes Maternal Grandmother    • Diabetes Paternal Grandmother        Social History  Social History     Socioeconomic History   • Marital status:    • Number of children: 2   Tobacco Use   • Smoking status: Never   • Smokeless tobacco: Never   Vaping Use   • Vaping Use: Never used   Substance and Sexual Activity   • Alcohol use: No   • Drug use: No   • Sexual activity: Yes     Partners: Male     Birth control/protection: Surgical       Objective   Vital Signs:   /76 (BP Location: Right arm, Patient Position: Sitting, Cuff Size: Adult)   Pulse 67   Temp 97.5 °F (36.4 °C)   Ht 165.1 cm (65\")   Wt 79.7 kg (175 lb 9.6 oz)   SpO2 100%   BMI 29.22 kg/m²     Physical Exam     Gen: " Patient in NAD. Pleasant and answers appropriately. A&Ox3.    Skin: Warm and dry with normal turgor. No purpura, rashes, or unusual pigmentation noted. Hair is normal in appearance and distribution.    HEENT: NC/AT. No lesions noted. Conjunctiva clear, sclera nonicteric. PERRL. EOMI without nystagmus or strabismus. Fundi appear benign. No hemorrhages or exudates of eyes. Auditory canals are patent bilaterally without lesions. TMs intact,  nonerythematous, bulging without lesions. Nasal mucosa erythematous, and nonedematous. Frontal and maxillary sinuses are nontender. O/P erythematous and moist without exudate.    Neck: Supple without lymph nodes palpated. FROM.     Lungs: Decreased B/L without rales, rhonchi, crackles, or wheezes.    Heart: Irregularly irregular. S1 and S2 normal. No S3 or S4. No MRGT.    Abd: Soft, nontender,nondistended. (+)BSx4 quadrants.  No flank tenderness bilaterally.    Extrem: No CCE. Radial pulses 2+/4 and equal B/L. FROMx4. No bone, joint, or muscle tenderness noted.    Neuro: No focal motor/sensory deficits.    Procedures     Assessment and Plan    Mercedes Dale is a 61 y.o. here for medical followup.    Problem List Items Addressed This Visit        Sleep    Insomnia    Relevant Medications    zolpidem (AMBIEN) 5 MG tablet   Other Visit Diagnoses     Kidney pain    -  Primary    Relevant Orders    POC Urinalysis Dipstick, Automated (Completed)    Urine frequency        Relevant Orders    POC Urinalysis Dipstick, Automated (Completed)    Dizziness        Relevant Medications    meclizine (ANTIVERT) 25 MG tablet      Anxiety  I advised her to discontinue the BuSpar. I will prescribe her a small dose of propranolol. I advised her to take the propranolol before going to bed. I will refill her Ambien and meclizine.    BMI is >= 25 and <30. (Overweight) The following options were offered after discussion;: exercise counseling/recommendations and nutrition counseling/recommendations               Follow Up   Return in about 4 weeks (around 11/8/2022).  Findings and plans discussed with patient who verbalizes understanding and agreement. Will followup with patient once results are in. Patient was given instructions and counseling regarding her condition or for health maintenance advice. Please see specific information pulled into the AVS if appropriate.       Jessica Graham MD    Transcribed from ambient dictation for Jessica Graham MD by Sondra Dougherty.  10/11/22   14:42 EDT    Patient or patient representative verbalized consent to the visit recording.  I have personally performed the services described in this document as transcribed by the above individual, and it is both accurate and complete.

## 2022-10-25 DIAGNOSIS — F41.9 ANXIETY: ICD-10-CM

## 2022-10-25 RX ORDER — BUSPIRONE HYDROCHLORIDE 5 MG/1
TABLET ORAL
Qty: 90 TABLET | Refills: 0 | OUTPATIENT
Start: 2022-10-25

## 2022-11-08 DIAGNOSIS — G47.09 OTHER INSOMNIA: ICD-10-CM

## 2022-11-11 RX ORDER — ZOLPIDEM TARTRATE 5 MG/1
5 TABLET ORAL EVERY OTHER DAY
Qty: 15 TABLET | Refills: 0 | Status: SHIPPED | OUTPATIENT
Start: 2022-11-11 | End: 2022-12-05 | Stop reason: SDUPTHER

## 2022-12-05 ENCOUNTER — OFFICE VISIT (OUTPATIENT)
Dept: FAMILY MEDICINE CLINIC | Facility: CLINIC | Age: 62
End: 2022-12-05

## 2022-12-05 VITALS
HEIGHT: 65 IN | WEIGHT: 173.2 LBS | HEART RATE: 61 BPM | DIASTOLIC BLOOD PRESSURE: 76 MMHG | BODY MASS INDEX: 28.86 KG/M2 | SYSTOLIC BLOOD PRESSURE: 120 MMHG | TEMPERATURE: 96.9 F | OXYGEN SATURATION: 100 %

## 2022-12-05 DIAGNOSIS — N23 KIDNEY PAIN: Primary | ICD-10-CM

## 2022-12-05 DIAGNOSIS — G47.09 OTHER INSOMNIA: ICD-10-CM

## 2022-12-05 DIAGNOSIS — H69.83 EUSTACHIAN TUBE DYSFUNCTION, BILATERAL: ICD-10-CM

## 2022-12-05 LAB
BILIRUB BLD-MCNC: NEGATIVE MG/DL
CLARITY, POC: CLEAR
COLOR UR: YELLOW
EXPIRATION DATE: ABNORMAL
GLUCOSE UR STRIP-MCNC: NEGATIVE MG/DL
KETONES UR QL: NEGATIVE
LEUKOCYTE EST, POC: NEGATIVE
Lab: ABNORMAL
NITRITE UR-MCNC: NEGATIVE MG/ML
PH UR: 6 [PH] (ref 5–8)
PROT UR STRIP-MCNC: NEGATIVE MG/DL
RBC # UR STRIP: ABNORMAL /UL
SP GR UR: 1.01 (ref 1–1.03)
UROBILINOGEN UR QL: NORMAL

## 2022-12-05 PROCEDURE — 99214 OFFICE O/P EST MOD 30 MIN: CPT | Performed by: FAMILY MEDICINE

## 2022-12-05 PROCEDURE — 80053 COMPREHEN METABOLIC PANEL: CPT | Performed by: FAMILY MEDICINE

## 2022-12-05 PROCEDURE — 81003 URINALYSIS AUTO W/O SCOPE: CPT | Performed by: FAMILY MEDICINE

## 2022-12-05 RX ORDER — PSEUDOEPHEDRINE HYDROCHLORIDE 60 MG/1
60 TABLET, FILM COATED ORAL 2 TIMES DAILY
Qty: 60 TABLET | Refills: 2 | Status: SHIPPED | OUTPATIENT
Start: 2022-12-05

## 2022-12-05 RX ORDER — ZOLPIDEM TARTRATE 5 MG/1
5 TABLET ORAL NIGHTLY PRN
Qty: 30 TABLET | Refills: 0 | Status: SHIPPED | OUTPATIENT
Start: 2022-12-05 | End: 2023-01-03 | Stop reason: SDUPTHER

## 2022-12-05 RX ORDER — TRIAMCINOLONE ACETONIDE 55 UG/1
2 SPRAY, METERED NASAL AS NEEDED
Qty: 16.9 ML | Refills: 2 | Status: SHIPPED | OUTPATIENT
Start: 2022-12-05 | End: 2023-12-05

## 2022-12-05 NOTE — PROGRESS NOTES
"Mercedes Dale     VITALS: Blood pressure 120/76, pulse 61, temperature 96.9 °F (36.1 °C), height 165.1 cm (65\"), weight 78.6 kg (173 lb 3.2 oz), SpO2 100 %, not currently breastfeeding.    Subjective  Chief Complaint  Insomnia and Flank Pain    Subjective          History of Present Illness:  The patient is a 61 y.o.  female with medical conditions significant for hyperlipidemia, atrial fibrillation, and insomnia who presents to the clinic secondary to medical follow-up.    The patient states she has been experiencing bilateral kidney pain, left greater than right, when she bends over and picks up heavy objects. She reports she has been drinking water and cranberry juice two times a day. She mentions she has been seeing an otolaryngologist, who suggested an allergy panel and a computed tomography scan; however, the otolaryngologist is not within her network. She mentions she has experienced moderate loss of hearing in her right ear with mid tones. She expresses she was informed that lack of hearing can cause this.    The patient reports she has been experiencing difficulty sleeping and reveals she has not had a sleep study in the past. She states she was on 10 mg of Ambien; however, she has decreased her medication from 10 mg to 5 mg. She reveals she has a family history of dementia and notes she does not want to be medicated. The patient mentions she has tried Remeron, trazodone, and melatonin.    No complaints about any of the medications currently prescribed.       The following portions of the patient's history were reviewed and updated as appropriate: allergies, current medications, past family history, past medical history, past social history, past surgical history and problem list.    Past Medical History  Past Medical History:   Diagnosis Date   • Abnormal heart rhythm    • Anxiety 09/01/2016   • Anxiety    • Atrial fibrillation (HCC)    • Back pain    • Constipation 09/01/2016   • Decreased libido    • " "Diverticulitis    • Heart murmur    • Heart palpitations    • Heartburn    • History of echocardiogram    • History of Holter monitoring    • History of stress test    • Hot flashes 09/01/2016   • Hyperlipidemia    • Hypertension 09/01/2016   • Insomnia 09/01/2016   • Mitral valve prolapse    • Other specified glaucoma    • Reflux esophagitis    • Sinusitis    • GEORGE (stress urinary incontinence, female) 09/01/2016       Surgical History  Past Surgical History:   Procedure Laterality Date   • GALLBLADDER SURGERY  2012   • KNEE ARTHROSCOPY      scope twice   • LAPAROSCOPIC ASSISTED VAGINAL HYSTERECTOMY SALPINGO OOPHORECTOMY      51       Family History  Family History   Problem Relation Age of Onset   • Breast cancer Cousin    • Stroke Mother    • Dementia Mother    • Mitral valve prolapse Mother    • Heart attack Mother    • Hypertension Mother    • Cancer Mother    • COPD Mother    • Kidney disease Mother    • Heart attack Father    • Hypertension Sister    • Thyroid disease Sister    • Anxiety disorder Sister    • Depression Sister    • Diabetes Maternal Grandmother    • Diabetes Paternal Grandmother        Social History  Social History     Socioeconomic History   • Marital status:    • Number of children: 2   Tobacco Use   • Smoking status: Never   • Smokeless tobacco: Never   Vaping Use   • Vaping Use: Never used   Substance and Sexual Activity   • Alcohol use: No   • Drug use: No   • Sexual activity: Yes     Partners: Male     Birth control/protection: Surgical       Objective   Vital Signs:   /76 (BP Location: Right arm, Patient Position: Sitting, Cuff Size: Adult)   Pulse 61   Temp 96.9 °F (36.1 °C)   Ht 165.1 cm (65\")   Wt 78.6 kg (173 lb 3.2 oz)   SpO2 100%   BMI 28.82 kg/m²     Physical Exam     Gen: Patient in NAD. Pleasant and answers appropriately. A&Ox3.    Skin: Warm and dry with normal turgor. No purpura, rashes, or unusual pigmentation noted. Hair is normal in appearance and " distribution.    HEENT: NC/AT. No lesions noted. Conjunctiva clear, sclera nonicteric. PERRL. EOMI without nystagmus or strabismus. Fundi appear benign. No hemorrhages or exudates of eyes. Auditory canals are patent bilaterally without lesions. TMs intact,  nonerythematous, bulging without lesions. Nasal mucosa pink, nonerythematous, and nonedematous. Frontal and maxillary sinuses are nontender. O/P slightly erythematous and moist without exudate.    Neck: Supple without lymph nodes palpated. FROM.     Lungs: CTA B/L without rales, rhonchi, crackles, or wheezes.    Heart: RRR. S1 and S2 normal. No S3 or S4. No MRGT.    Abd: Soft, nontender,nondistended. (+)BSx4 quadrants.     Extrem: No CCE. Radial pulses 2+/4 and equal B/L. FROMx4.  Positive joint tenderness noted.    Neuro: No focal motor/sensory deficits.    Procedures         Assessment and Plan    Mercedes Dale is a 61 y.o. here for medical followup.    Problem List Items Addressed This Visit        Sleep    Insomnia    Relevant Medications    zolpidem (AMBIEN) 5 MG tablet    Other Relevant Orders    Comprehensive Metabolic Panel    Ambulatory Referral to Sleep Medicine   Other Visit Diagnoses     Kidney pain    -  Primary    Relevant Orders    Comprehensive Metabolic Panel    CT Abdomen Pelvis Stone Protocol                Eustachian tube dysfunction, bilateral        Relevant Medications    pseudoephedrine (SUDAFED) 60 MG tablet    Triamcinolone Acetonide (Nasacort Allergy 24HR) 55 MCG/ACT nasal inhaler    Other Relevant Orders    Comprehensive Metabolic Panel        Plan:    1. Kidney stone.  I will obtain and review a computed tomography scan of the patient's kidneys.    2. Insomnia.  I will obtain a sleep study for the patient.   I will prescribe the patient Ambien 5 mg one time daily.        BMI is >= 25 and <30. (Overweight) The following options were offered after discussion;: exercise counseling/recommendations and nutrition  counseling/recommendations            Follow Up   Return in about 2 months (around 2/5/2023), or ([facility]) LABS; cancel 12/28 appt.  Findings and plans discussed with patient who verbalizes understanding and agreement. Will followup with patient once results are in. Patient was given instructions and counseling regarding her condition or for health maintenance advice. Please see specific information pulled into the AVS if appropriate.       Transcribed from ambient dictation for Jessica Graham MD by Donna Cross.  12/05/22   13:33 EST    Jessica Graham MD  Answers for HPI/ROS submitted by the patient on 12/5/2022  Please describe your symptoms.: Lower back (kidney) area  Have you had these symptoms before?: Yes  How long have you been having these symptoms?: Greater than 2 weeks  Please list any medications you are currently taking for this condition.: None.  Drinking ample amount of water.  Please describe any probable cause for these symptoms. : ?  What is the primary reason for your visit?: Other

## 2022-12-06 ENCOUNTER — HOSPITAL ENCOUNTER (OUTPATIENT)
Dept: MAMMOGRAPHY | Facility: HOSPITAL | Age: 62
Discharge: HOME | End: 2022-12-06
Admitting: RADIOLOGY

## 2022-12-06 DIAGNOSIS — R92.8 ABNORMAL MAMMOGRAM: ICD-10-CM

## 2022-12-06 LAB
ALBUMIN SERPL-MCNC: 4.6 G/DL (ref 3.5–5.2)
ALBUMIN/GLOB SERPL: 1.2 G/DL
ALP SERPL-CCNC: 66 U/L (ref 39–117)
ALT SERPL W P-5'-P-CCNC: 35 U/L (ref 1–33)
ANION GAP SERPL CALCULATED.3IONS-SCNC: 13.7 MMOL/L (ref 5–15)
AST SERPL-CCNC: 30 U/L (ref 1–32)
BILIRUB SERPL-MCNC: 0.6 MG/DL (ref 0–1.2)
BUN SERPL-MCNC: 16 MG/DL (ref 8–23)
BUN/CREAT SERPL: 15.7 (ref 7–25)
CALCIUM SPEC-SCNC: 9.5 MG/DL (ref 8.6–10.5)
CHLORIDE SERPL-SCNC: 98 MMOL/L (ref 98–107)
CO2 SERPL-SCNC: 26.3 MMOL/L (ref 22–29)
CREAT SERPL-MCNC: 1.02 MG/DL (ref 0.57–1)
EGFRCR SERPLBLD CKD-EPI 2021: 62.7 ML/MIN/1.73
GLOBULIN UR ELPH-MCNC: 3.7 GM/DL
GLUCOSE SERPL-MCNC: 94 MG/DL (ref 65–99)
POTASSIUM SERPL-SCNC: 3.5 MMOL/L (ref 3.5–5.2)
PROT SERPL-MCNC: 8.3 G/DL (ref 6–8.5)
SODIUM SERPL-SCNC: 138 MMOL/L (ref 136–145)

## 2022-12-06 PROCEDURE — G0279 TOMOSYNTHESIS, MAMMO: HCPCS

## 2022-12-06 PROCEDURE — 77062 BREAST TOMOSYNTHESIS BI: CPT | Performed by: RADIOLOGY

## 2022-12-06 PROCEDURE — 77066 DX MAMMO INCL CAD BI: CPT

## 2022-12-06 PROCEDURE — 77066 DX MAMMO INCL CAD BI: CPT | Performed by: RADIOLOGY

## 2022-12-07 ENCOUNTER — TELEPHONE (OUTPATIENT)
Dept: FAMILY MEDICINE CLINIC | Facility: CLINIC | Age: 62
End: 2022-12-07

## 2022-12-07 RX ORDER — APIXABAN 5 MG/1
TABLET, FILM COATED ORAL
Qty: 180 TABLET | Refills: 3 | Status: SHIPPED | OUTPATIENT
Start: 2022-12-07

## 2022-12-07 NOTE — TELEPHONE ENCOUNTER
----- Message from Jessica Graham MD sent at 12/7/2022  3:06 AM EST -----  Please call the patient regarding her abnormal result. Let her know that all labs are okay, but her kidney function went back up just a little to 1.02. It's not bad. Have her monitor her diet and fluids.       Patient notified & verbalized understanding.

## 2023-01-03 ENCOUNTER — OFFICE VISIT (OUTPATIENT)
Dept: PULMONOLOGY | Facility: CLINIC | Age: 63
End: 2023-01-03
Payer: COMMERCIAL

## 2023-01-03 VITALS
SYSTOLIC BLOOD PRESSURE: 138 MMHG | WEIGHT: 172 LBS | HEIGHT: 66 IN | DIASTOLIC BLOOD PRESSURE: 82 MMHG | TEMPERATURE: 98.2 F | BODY MASS INDEX: 27.64 KG/M2 | OXYGEN SATURATION: 98 % | HEART RATE: 62 BPM

## 2023-01-03 DIAGNOSIS — Z23 NEED FOR IMMUNIZATION AGAINST INFLUENZA: ICD-10-CM

## 2023-01-03 DIAGNOSIS — G47.09 OTHER INSOMNIA: ICD-10-CM

## 2023-01-03 DIAGNOSIS — G47.33 OSA (OBSTRUCTIVE SLEEP APNEA): Primary | ICD-10-CM

## 2023-01-03 DIAGNOSIS — E66.3 OVERWEIGHT: ICD-10-CM

## 2023-01-03 PROCEDURE — 99214 OFFICE O/P EST MOD 30 MIN: CPT | Performed by: NURSE PRACTITIONER

## 2023-01-03 PROCEDURE — 1160F RVW MEDS BY RX/DR IN RCRD: CPT | Performed by: NURSE PRACTITIONER

## 2023-01-03 PROCEDURE — 90686 IIV4 VACC NO PRSV 0.5 ML IM: CPT | Performed by: NURSE PRACTITIONER

## 2023-01-03 PROCEDURE — 90471 IMMUNIZATION ADMIN: CPT | Performed by: NURSE PRACTITIONER

## 2023-01-03 PROCEDURE — 1159F MED LIST DOCD IN RCRD: CPT | Performed by: NURSE PRACTITIONER

## 2023-01-03 RX ORDER — ZOLPIDEM TARTRATE 5 MG/1
5 TABLET ORAL NIGHTLY PRN
Qty: 30 TABLET | Refills: 2 | Status: SHIPPED | OUTPATIENT
Start: 2023-01-03 | End: 2023-03-21 | Stop reason: SDUPTHER

## 2023-01-03 NOTE — PROGRESS NOTES
Chief Complaint  Sleep Apnea and Insomnia    Subjective        Mercedes Dale presents to [facility]  History of Present Illness     Ms. Dale is a 62 year old female with a medical history significant for anxiety, insomnia, hypertension, anxiety, atrial fibrillation, and hyperlipidemia.      She presents today for evaluation of sleep apnea and insomnia.  She states that she has had insomnia since she was a child.  She reports that she has tried several OTC medication including melatonin with no success in initiating sleep.  She has also been on buspar, which she was not able to tolerate.  She is currently on Ambien 5 mg po and reports that this works well for her insomnia.  She tells me that when she does not take ambien she does not sleep and is often fatigued during the day.  She does reports that she has some occasional snoring. She also complains of non restorative sleep.        Objective   Vital Signs:  /82 (BP Location: Left arm, Patient Position: Sitting)   Pulse 62   Temp 98.2 °F (36.8 °C)   Ht 167.6 cm (66\")   Wt 78 kg (172 lb)   SpO2 98%   BMI 27.76 kg/m²   Estimated body mass index is 27.76 kg/m² as calculated from the following:    Height as of this encounter: 167.6 cm (66\").    Weight as of this encounter: 78 kg (172 lb).    BMI is >= 25 and <30. (Overweight) The following options were offered after discussion;: exercise counseling/recommendations and nutrition counseling/recommendations      Physical Exam     GENERAL APPEARANCE: Well developed, well nourished, alert and cooperative, and appears to be in no acute distress.    HEAD: normocephalic. Atraumatic.    EYES: PERRL, EOMI. Vision is grossly intact.    THROAT: Oral cavity and pharynx normal. No inflammation, swelling, exudate, or lesions.     NECK: Neck supple.  No thyromegaly.    CARDIAC: Normal S1 and S2. No S3, S4 or murmurs. Rhythm is regular.     RESPIRATORY:Bilateral air  entry positive. Bilateral diminished breath sounds. No wheezing, crackles or rhonchi noted.    GI: Positive bowel sounds. Soft, nondistended, nontender.     MUSCULOSKELETAL: No significant deformity or joint abnormality. No edema. Peripheral pulses intact. No varicosities.    NEUROLOGICAL: Strength and sensation symmetric and intact throughout.     PSYCHIATRIC: The mental examination revealed the patient was oriented to person, place, and time.     Result Review :  The following data was reviewed by: DARIO Junior on 01/03/2023:  Common labs    Common Labs 8/12/22 9/28/22 9/28/22 9/28/22 9/28/22 12/5/22     1159 1159 1159 1159    Glucose 100 (A)  88   94   BUN 16  18   16   Creatinine 1.26 (A)  0.96   1.02 (A)   Sodium 140  139   138   Potassium 5.0  4.1   3.5   Chloride 101  102   98   Calcium 9.8  9.7   9.5   Albumin 4.60  4.60   4.60   Total Bilirubin 0.6  0.5   0.6   Alkaline Phosphatase 58  64   66   AST (SGOT) 41 (A)  37 (A)   30   ALT (SGPT) 48 (A)  42 (A)   35 (A)   WBC     7.02    Hemoglobin     14.6    Hematocrit     42.5    Platelets     235    Total Cholesterol    274 (A)     Triglycerides    360 (A)     HDL Cholesterol    39 (A)     LDL Cholesterol     166 (A)     Hemoglobin A1C  5.50       (A) Abnormal value       Comments are available for some flowsheets but are not being displayed.                    Assessment and Plan   Diagnoses and all orders for this visit:    1. TIMMY (obstructive sleep apnea) (Primary)  -     Ambulatory Referral to Sleep Lab    2. Need for immunization against influenza  -     FluLaval/Fluzone >6 mos  ([id number])    3. Other insomnia  -     zolpidem (AMBIEN) 5 MG tablet; Take 1 tablet by mouth At Night As Needed for Sleep.  Dispense: 30 tablet; Refill: 2    4. Overweight              Has this patient had a previous sleep study? no   Has the patient had observed apnea during sleep?no   Do experience excessive daytime sleepiness: yes  Do experience habitual snoring,  gasping/choking episodes associated with awakenings?yes   Do you have unexplained hypertension?yes   Do you have Nonrestorative sleep? yes             [place] sleepiness scale: 3  STOPBANG score: 2  Ordered in lab sleep study.  Ordered Ambien 5 mp po to take nightly.   The patient was extensively educated on the consequences of untreated obstructive sleep apnea namely cardiovascular/metabolic disorder, neurocognitive deficit, daytime sleepiness, motor vehicle accidents, depression, mood disorders and reduced quality of life.  At the end of conversation, the patient voices understanding of the disease process and treatment modality.  Patient also understands the risk of untreated obstructive sleep apnea and benefit benefits of the treatment.    Counseling time was greater than 10 minutes.        Influenza vaccine given in office.     Follow Up   Return in about 3 months (around 4/3/2023).  Patient was given instructions and counseling regarding her condition or for health maintenance advice. Please see specific information pulled into the AVS if appropriate.

## 2023-01-10 ENCOUNTER — HOSPITAL ENCOUNTER (OUTPATIENT)
Dept: CT IMAGING | Facility: HOSPITAL | Age: 63
Discharge: HOME | End: 2023-01-10
Admitting: FAMILY MEDICINE
Payer: COMMERCIAL

## 2023-01-10 PROCEDURE — 74176 CT ABD & PELVIS W/O CONTRAST: CPT | Performed by: RADIOLOGY

## 2023-01-10 PROCEDURE — 74176 CT ABD & PELVIS W/O CONTRAST: CPT

## 2023-01-16 ENCOUNTER — TELEPHONE (OUTPATIENT)
Dept: FAMILY MEDICINE CLINIC | Facility: CLINIC | Age: 63
End: 2023-01-16
Payer: COMMERCIAL

## 2023-01-16 NOTE — TELEPHONE ENCOUNTER
----- Message from Jessica Graham MD sent at 1/15/2023 11:41 PM EST -----  Please call Mercedes. Let her know that the CT was mostly okay. She does have a lot of stool, which could be why she has pain. Is she taking anything to help her BMs?     Also, she has borderline enlarged inguinal lymph nodes. Her CBC is okay though. We'll discuss more at her next appointment.       Spoke with patient & she verbalized understanding,reports she takes Benefiber everyday but only takes Miralax a couple of times a month when she feels like she needs it,will add her Miralax to her daily regimen as well.

## 2023-02-07 ENCOUNTER — OFFICE VISIT (OUTPATIENT)
Dept: FAMILY MEDICINE CLINIC | Facility: CLINIC | Age: 63
End: 2023-02-07
Payer: COMMERCIAL

## 2023-02-07 VITALS
HEART RATE: 62 BPM | HEIGHT: 66 IN | SYSTOLIC BLOOD PRESSURE: 120 MMHG | OXYGEN SATURATION: 98 % | DIASTOLIC BLOOD PRESSURE: 78 MMHG | WEIGHT: 172.6 LBS | BODY MASS INDEX: 27.74 KG/M2 | TEMPERATURE: 97.5 F

## 2023-02-07 DIAGNOSIS — G47.00 INSOMNIA, UNSPECIFIED TYPE: ICD-10-CM

## 2023-02-07 DIAGNOSIS — R79.89 ELEVATED SERUM CREATININE: Primary | ICD-10-CM

## 2023-02-07 DIAGNOSIS — K59.09 OTHER CONSTIPATION: ICD-10-CM

## 2023-02-07 PROCEDURE — 36415 COLL VENOUS BLD VENIPUNCTURE: CPT | Performed by: FAMILY MEDICINE

## 2023-02-07 PROCEDURE — 80053 COMPREHEN METABOLIC PANEL: CPT | Performed by: FAMILY MEDICINE

## 2023-02-07 PROCEDURE — 85025 COMPLETE CBC W/AUTO DIFF WBC: CPT | Performed by: FAMILY MEDICINE

## 2023-02-07 PROCEDURE — 99214 OFFICE O/P EST MOD 30 MIN: CPT | Performed by: FAMILY MEDICINE

## 2023-02-07 RX ORDER — SCOLOPAMINE TRANSDERMAL SYSTEM 1 MG/1
1 PATCH, EXTENDED RELEASE TRANSDERMAL
Qty: 10 EACH | Refills: 0 | Status: SHIPPED | OUTPATIENT
Start: 2023-02-07

## 2023-02-07 NOTE — PROGRESS NOTES
"Mercedes Dale     VITALS: Blood pressure 120/78, pulse 62, temperature 97.5 °F (36.4 °C), height 167.6 cm (66\"), weight 78.3 kg (172 lb 9.6 oz), SpO2 98 %, not currently breastfeeding.    Subjective  Chief Complaint  CT Scan and Hair falling out    Subjective          History of Present Illness:  Patient is a 62 y.o. female with medical conditions significant for hyperlipidemia, atrial fibrillation, and insomnia who presents to clinic for medical follow-up.    The patient confirms feeling better and that her sleeping has improved since resuming the Zolpidem 5 mg prescribed by Dr. Nichole. She has not been feeling anxious. She reports that Dr. Nichole ordered a sleep study that has not been scheduled yet.     The patient had a CT scan of the abdomen and pelvis that showed a borderline enlarged lymph node. She notes that she had a virus at least 2 weeks ago that \"cleaned everything out\", but she can feel her symptoms recurring. She has a \"semi-soft form\" and inquires if she should resume the MiraLAX. She is taking Benefiber. The patient is still experiencing back pain and reports frequent urination, but is drinking plenty of water. She denies abnormal vaginal discharge, odor, or bleeding. She has not had any soft drinks since 12/2022. She denies pain to the enlarged lymph node. The patient notes that she had swollen lymph nodes when she was sick in 12/2022 and believes she contracted the recent virus from her grandson.     The patient denies having COVID-19 or losing her sense of taste or smell. She has noticed hair loss and brittle nails in the past month. She has been taking the Thrive vitamins.     The patient states that her tinnitus still persists, but is not as severe. She notes that Dr. Mckinney asked if she had an injury to her middle ear, but she has not. She denies using Q-tips in her ear. The patient experiences motion sickness, she believes caused be her inner ear, and she will be going on a cruise for 7 " days in 03/2023. She has meclizine and confirms wanting a prescription of motion sickness patches.      No complaints about any of the medications.    The following portions of the patient's history were reviewed and updated as appropriate: allergies, current medications, past family history, past medical history, past social history, past surgical history and problem list.    Past Medical History  Past Medical History:   Diagnosis Date   • Abnormal heart rhythm    • Anxiety 09/01/2016   • Anxiety    • Atrial fibrillation (HCC)    • Back pain    • Constipation 09/01/2016   • Decreased libido    • Diverticulitis    • Heart murmur    • Heart palpitations    • Heartburn    • History of echocardiogram    • History of Holter monitoring    • History of stress test    • Hot flashes 09/01/2016   • Hyperlipidemia    • Hypertension 09/01/2016   • Insomnia 09/01/2016   • Mitral valve prolapse    • Other specified glaucoma    • Reflux esophagitis    • Sinusitis    • GEORGE (stress urinary incontinence, female) 09/01/2016       Surgical History  Past Surgical History:   Procedure Laterality Date   • GALLBLADDER SURGERY  2012   • KNEE ARTHROSCOPY      scope twice   • LAPAROSCOPIC ASSISTED VAGINAL HYSTERECTOMY SALPINGO OOPHORECTOMY      51       Family History  Family History   Problem Relation Age of Onset   • Breast cancer Cousin    • Stroke Mother    • Dementia Mother    • Mitral valve prolapse Mother    • Heart attack Mother    • Hypertension Mother    • Cancer Mother    • COPD Mother    • Kidney disease Mother    • Heart attack Father    • Heart failure Father    • Hypertension Sister    • Thyroid disease Sister    • Anxiety disorder Sister    • Depression Sister    • Diabetes Maternal Grandmother    • Diabetes Paternal Grandmother        Social History  Social History     Socioeconomic History   • Marital status:    • Number of children: 2   Tobacco Use   • Smoking status: Never   • Smokeless tobacco: Never   Vaping Use  "  • Vaping Use: Never used   Substance and Sexual Activity   • Alcohol use: No   • Drug use: No   • Sexual activity: Yes     Partners: Male     Birth control/protection: Surgical       Objective   Vital Signs:   /78 (BP Location: Right arm, Patient Position: Sitting, Cuff Size: Adult)   Pulse 62   Temp 97.5 °F (36.4 °C)   Ht 167.6 cm (66\")   Wt 78.3 kg (172 lb 9.6 oz)   SpO2 98%   BMI 27.86 kg/m²     Physical Exam     Gen: Patient in NAD. Pleasant and answers appropriately. A&Ox3.    Skin: Warm and dry with normal turgor. No purpura, rashes, or unusual pigmentation noted. Hair is normal in appearance and distribution.    HEENT: NC/AT. No lesions noted. Conjunctiva clear, sclera nonicteric. PERRL. EOMI without nystagmus or strabismus. Fundi appear benign. No hemorrhages or exudates of eyes. Auditory canals are patent bilaterally without lesions. TMs intact,  nonerythematous, nonbulging without lesions. Nasal mucosa pink, nonerythematous, and nonedematous. Frontal and maxillary sinuses are nontender. O/P nonerythematous and moist without exudate.    Neck: Supple without lymph nodes palpated. FROM.     Lungs: CTA B/L without rales, rhonchi, crackles, or wheezes.    Heart: RRR. S1 and S2 normal. No S3 or S4. No MRGT.    Abd: Soft, nontender, slightly distended. (+)BSx4 quadrants.  No HSM or masses.    Extrem: No CCE. Radial pulses 2+/4 and equal B/L. FROMx4. No bone, joint, or muscle tenderness noted.    Neuro: No focal motor/sensory deficits.    Procedures    Result Review :   The following data was reviewed by: Jessica Graham MD on 02/07/2023:          Assessment and Plan      1. Insomnia.  - She will continue taking Zolpidem 5 mg.    2. Constipation.  - She will resume taking MiraLAX up to twice daily.  - She will continue taking Benefiber twice daily.    3. Borderline enlarged lymph node.  - Blood work will be obtained. Her kidney function will be checked.     4. Hair loss.  - She was advised to take " biotin or prenatal vitamins.    5. Motion sickness.  - Scopolamine patches will be prescribed.       Problem List Items Addressed This Visit    None  Visit Diagnoses     Elevated serum creatinine    -  Primary    Relevant Orders    CBC Auto Differential    Comprehensive Metabolic Panel          BMI is >= 25 and <30. (Overweight) The following options were offered after discussion;: exercise counseling/recommendations and nutrition counseling/recommendations                   Follow Up   Return in about 3 months (around 5/7/2023), or LABS.  Findings and plans discussed with patient who verbalizes understanding and agreement. Will followup with patient once results are in. Patient was given instructions and counseling regarding her condition or for health maintenance advice. Please see specific information pulled into the AVS if appropriate.       Jessica Graham MD    Transcribed from ambient dictation for Jessica Graham MD by Nolvia Eagle.  02/07/23   12:35 EST    Patient or patient representative verbalized consent to the visit recording.  I have personally performed the services described in this document as transcribed by the above individual, and it is both accurate and complete.

## 2023-02-08 LAB
ALBUMIN SERPL-MCNC: 4.2 G/DL (ref 3.5–5.2)
ALBUMIN/GLOB SERPL: 1.3 G/DL
ALP SERPL-CCNC: 51 U/L (ref 39–117)
ALT SERPL W P-5'-P-CCNC: 35 U/L (ref 1–33)
ANION GAP SERPL CALCULATED.3IONS-SCNC: 10.5 MMOL/L (ref 5–15)
AST SERPL-CCNC: 28 U/L (ref 1–32)
BASOPHILS # BLD AUTO: 0.04 10*3/MM3 (ref 0–0.2)
BASOPHILS NFR BLD AUTO: 0.5 % (ref 0–1.5)
BILIRUB SERPL-MCNC: 0.5 MG/DL (ref 0–1.2)
BUN SERPL-MCNC: 21 MG/DL (ref 8–23)
BUN/CREAT SERPL: 18.8 (ref 7–25)
CALCIUM SPEC-SCNC: 9.3 MG/DL (ref 8.6–10.5)
CHLORIDE SERPL-SCNC: 105 MMOL/L (ref 98–107)
CO2 SERPL-SCNC: 25.5 MMOL/L (ref 22–29)
CREAT SERPL-MCNC: 1.12 MG/DL (ref 0.57–1)
DEPRECATED RDW RBC AUTO: 43.4 FL (ref 37–54)
EGFRCR SERPLBLD CKD-EPI 2021: 55.7 ML/MIN/1.73
EOSINOPHIL # BLD AUTO: 0.08 10*3/MM3 (ref 0–0.4)
EOSINOPHIL NFR BLD AUTO: 1 % (ref 0.3–6.2)
ERYTHROCYTE [DISTWIDTH] IN BLOOD BY AUTOMATED COUNT: 12.2 % (ref 12.3–15.4)
GLOBULIN UR ELPH-MCNC: 3.3 GM/DL
GLUCOSE SERPL-MCNC: 86 MG/DL (ref 65–99)
HCT VFR BLD AUTO: 39.2 % (ref 34–46.6)
HGB BLD-MCNC: 13.1 G/DL (ref 12–15.9)
IMM GRANULOCYTES # BLD AUTO: 0.01 10*3/MM3 (ref 0–0.05)
IMM GRANULOCYTES NFR BLD AUTO: 0.1 % (ref 0–0.5)
LYMPHOCYTES # BLD AUTO: 2.44 10*3/MM3 (ref 0.7–3.1)
LYMPHOCYTES NFR BLD AUTO: 30.4 % (ref 19.6–45.3)
MCH RBC QN AUTO: 32.1 PG (ref 26.6–33)
MCHC RBC AUTO-ENTMCNC: 33.4 G/DL (ref 31.5–35.7)
MCV RBC AUTO: 96.1 FL (ref 79–97)
MONOCYTES # BLD AUTO: 0.62 10*3/MM3 (ref 0.1–0.9)
MONOCYTES NFR BLD AUTO: 7.7 % (ref 5–12)
NEUTROPHILS NFR BLD AUTO: 4.83 10*3/MM3 (ref 1.7–7)
NEUTROPHILS NFR BLD AUTO: 60.3 % (ref 42.7–76)
NRBC BLD AUTO-RTO: 0 /100 WBC (ref 0–0.2)
PLATELET # BLD AUTO: 253 10*3/MM3 (ref 140–450)
PMV BLD AUTO: 12.6 FL (ref 6–12)
POTASSIUM SERPL-SCNC: 3.9 MMOL/L (ref 3.5–5.2)
PROT SERPL-MCNC: 7.5 G/DL (ref 6–8.5)
RBC # BLD AUTO: 4.08 10*6/MM3 (ref 3.77–5.28)
SODIUM SERPL-SCNC: 141 MMOL/L (ref 136–145)
WBC NRBC COR # BLD: 8.02 10*3/MM3 (ref 3.4–10.8)

## 2023-02-13 ENCOUNTER — TELEPHONE (OUTPATIENT)
Dept: FAMILY MEDICINE CLINIC | Facility: CLINIC | Age: 63
End: 2023-02-13
Payer: COMMERCIAL

## 2023-02-13 NOTE — TELEPHONE ENCOUNTER
Patient called to report that she tested positive for Covid this am,felt bad Saturday that is when symptoms started but feels better today only feels like she has a sinus infection,please advise.

## 2023-02-13 NOTE — TELEPHONE ENCOUNTER
Can she take her O2 sats/vitals?     Please make sure she gets on Vitamin C, Vitamin D, zinc, and baby aspirin. She needs to isolate. She needs to sleep on her stomach. She needs to start doing deep breathing exercises.      We do have paxlovid now which she does qualify for secondary to her heart and kidneys. This is a series of oral pills, like how we do with tamiflu for flu. This is the equivalent to the monoclonal antibodies treatment that we were offering at the hospital last year. It is phenomenal. However, one of the recent side effects that we have seen is bounceback COVID. What that means is that we see the patient getting better and then 3-4 weeks later -I'm assuming it's when the medication wears off - we may see repeat symptoms. (Would then do another round of the medication). This should not stop her from taking it - we have been getting excellent results from the medication - faster recovery, weaker symptoms, less post covid symptoms, less amount of hospital stay, and of course, decreased death numbers. So it's a very good drug. I just wanted her to be aware of the biggest side effect we are seeing right now. Would she like to start?

## 2023-02-13 NOTE — TELEPHONE ENCOUNTER
I am totally fine with that. I would have her take mucinex and sudafed and just rest.      Patient notified & verbalized understanding.

## 2023-02-13 NOTE — TELEPHONE ENCOUNTER
Can she take her O2 sats/vitals?     Please make sure she gets on Vitamin C, Vitamin D, zinc, and baby aspirin. She needs to isolate. She needs to sleep on her stomach. She needs to start doing deep breathing exercises.      We do have paxlovid now which she does qualify for secondary to her heart and kidneys. This is a series of oral pills, like how we do with tamiflu for flu. This is the equivalent to the monoclonal antibodies treatment that we were offering at the hospital last year. It is phenomenal. However, one of the recent side effects that we have seen is bounceback COVID. What that means is that we see the patient getting better and then 3-4 weeks later -I'm assuming it's when the medication wears off - we may see repeat symptoms. (Would then do another round of the medication). This should not stop her from taking it - we have been getting excellent results from the medication - faster recovery, weaker symptoms, less post covid symptoms, less amount of hospital stay, and of course, decreased death numbers. So it's a very good drug. I just wanted her to be aware of the biggest side effect we are seeing right now. Would she like to start?     Spoke with patient her O2 sat is 97% P-77 no way to check BP,she verbalized understanding,she really does not want to take the Paxlovid reports she really does not feel that bad.

## 2023-02-14 ENCOUNTER — TELEPHONE (OUTPATIENT)
Dept: CARDIOLOGY | Facility: CLINIC | Age: 63
End: 2023-02-14
Payer: COMMERCIAL

## 2023-02-14 NOTE — TELEPHONE ENCOUNTER
Ezequiel Gonzalez PA-C Hurley, Mary Katheri, RN  Caller: Unspecified (Today, 11:41 AM)  If she wants to take aspirin for pain relief or for fever, that would be fine. As far as preventing clots during COVID19, Eliquis should be sufficient alone   _________________________________________________    Called pt and gave KW recommendations above. Pt verbalizes understanding and agreeable to plan.

## 2023-02-14 NOTE — TELEPHONE ENCOUNTER
Patient has tested positive for COVID and was advised to take Vitamin C, Vitamin D, zinc, and baby aspirin. Patient already takes Eliquis and wants to know if she should take ASA in addition to Eliquis during this time.    Please advise.

## 2023-03-21 DIAGNOSIS — G47.09 OTHER INSOMNIA: ICD-10-CM

## 2023-03-21 RX ORDER — ZOLPIDEM TARTRATE 5 MG/1
5 TABLET ORAL NIGHTLY PRN
Qty: 30 TABLET | Refills: 5 | Status: SHIPPED | OUTPATIENT
Start: 2023-03-21

## 2023-05-03 ENCOUNTER — TRANSCRIBE ORDERS (OUTPATIENT)
Dept: ADMINISTRATIVE | Facility: HOSPITAL | Age: 63
End: 2023-05-03
Payer: COMMERCIAL

## 2023-05-03 DIAGNOSIS — J32.9 CHRONIC SINUSITIS, UNSPECIFIED LOCATION: Primary | ICD-10-CM

## 2023-05-08 ENCOUNTER — OFFICE VISIT (OUTPATIENT)
Dept: FAMILY MEDICINE CLINIC | Facility: CLINIC | Age: 63
End: 2023-05-08
Payer: COMMERCIAL

## 2023-05-08 VITALS
DIASTOLIC BLOOD PRESSURE: 74 MMHG | BODY MASS INDEX: 27.51 KG/M2 | HEART RATE: 59 BPM | WEIGHT: 171.2 LBS | SYSTOLIC BLOOD PRESSURE: 122 MMHG | TEMPERATURE: 97.1 F | OXYGEN SATURATION: 98 % | HEIGHT: 66 IN

## 2023-05-08 DIAGNOSIS — E55.9 VITAMIN D DEFICIENCY: ICD-10-CM

## 2023-05-08 DIAGNOSIS — R79.89 ELEVATED SERUM CREATININE: ICD-10-CM

## 2023-05-08 DIAGNOSIS — S39.012A STRAIN OF TENDON OF LOWER BACK: Primary | ICD-10-CM

## 2023-05-08 DIAGNOSIS — E78.49 OTHER HYPERLIPIDEMIA: ICD-10-CM

## 2023-05-08 PROCEDURE — 80053 COMPREHEN METABOLIC PANEL: CPT | Performed by: FAMILY MEDICINE

## 2023-05-08 PROCEDURE — 82306 VITAMIN D 25 HYDROXY: CPT | Performed by: FAMILY MEDICINE

## 2023-05-08 PROCEDURE — 36415 COLL VENOUS BLD VENIPUNCTURE: CPT | Performed by: FAMILY MEDICINE

## 2023-05-08 RX ORDER — METHOCARBAMOL 500 MG/1
500 TABLET, FILM COATED ORAL 2 TIMES DAILY PRN
Qty: 30 TABLET | Refills: 0 | Status: SHIPPED | OUTPATIENT
Start: 2023-05-08

## 2023-05-08 RX ORDER — ATORVASTATIN CALCIUM 10 MG/1
10 TABLET, FILM COATED ORAL DAILY
Qty: 90 TABLET | Refills: 1 | Status: SHIPPED | OUTPATIENT
Start: 2023-05-08
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
## 2023-05-09 LAB
25(OH)D3 SERPL-MCNC: 24 NG/ML (ref 30–100)
ALBUMIN SERPL-MCNC: 4.4 G/DL (ref 3.5–5.2)
ALBUMIN/GLOB SERPL: 1.4 G/DL
ALP SERPL-CCNC: 49 U/L (ref 39–117)
ALT SERPL W P-5'-P-CCNC: 33 U/L (ref 1–33)
ANION GAP SERPL CALCULATED.3IONS-SCNC: 11.3 MMOL/L (ref 5–15)
AST SERPL-CCNC: 25 U/L (ref 1–32)
BILIRUB SERPL-MCNC: 0.7 MG/DL (ref 0–1.2)
BUN SERPL-MCNC: 16 MG/DL (ref 8–23)
BUN/CREAT SERPL: 13.7 (ref 7–25)
CALCIUM SPEC-SCNC: 9.6 MG/DL (ref 8.6–10.5)
CHLORIDE SERPL-SCNC: 103 MMOL/L (ref 98–107)
CO2 SERPL-SCNC: 24.7 MMOL/L (ref 22–29)
CREAT SERPL-MCNC: 1.17 MG/DL (ref 0.57–1)
EGFRCR SERPLBLD CKD-EPI 2021: 52.9 ML/MIN/1.73
GLOBULIN UR ELPH-MCNC: 3.2 GM/DL
GLUCOSE SERPL-MCNC: 106 MG/DL (ref 65–99)
POTASSIUM SERPL-SCNC: 3.9 MMOL/L (ref 3.5–5.2)
PROT SERPL-MCNC: 7.6 G/DL (ref 6–8.5)
SODIUM SERPL-SCNC: 139 MMOL/L (ref 136–145)

## 2023-07-31 RX ORDER — DILTIAZEM HYDROCHLORIDE 120 MG/1
CAPSULE, COATED, EXTENDED RELEASE ORAL
Qty: 90 CAPSULE | Refills: 5 | Status: SHIPPED | OUTPATIENT
Start: 2023-07-31

## 2023-08-08 ENCOUNTER — OFFICE VISIT (OUTPATIENT)
Dept: FAMILY MEDICINE CLINIC | Facility: CLINIC | Age: 63
End: 2023-08-08
Payer: COMMERCIAL

## 2023-08-08 ENCOUNTER — HOSPITAL ENCOUNTER (OUTPATIENT)
Dept: CT IMAGING | Facility: HOSPITAL | Age: 63
Discharge: HOME | End: 2023-08-08
Admitting: FAMILY MEDICINE
Payer: COMMERCIAL

## 2023-08-08 VITALS
OXYGEN SATURATION: 100 % | SYSTOLIC BLOOD PRESSURE: 126 MMHG | WEIGHT: 176.6 LBS | HEART RATE: 60 BPM | TEMPERATURE: 97.5 F | HEIGHT: 66 IN | BODY MASS INDEX: 28.38 KG/M2 | DIASTOLIC BLOOD PRESSURE: 84 MMHG

## 2023-08-08 DIAGNOSIS — R59.1 LYMPHADENOPATHY: ICD-10-CM

## 2023-08-08 DIAGNOSIS — E78.49 OTHER HYPERLIPIDEMIA: ICD-10-CM

## 2023-08-08 DIAGNOSIS — H69.83 EUSTACHIAN TUBE DYSFUNCTION, BILATERAL: ICD-10-CM

## 2023-08-08 DIAGNOSIS — N23 KIDNEY PAIN: Primary | ICD-10-CM

## 2023-08-08 DIAGNOSIS — R31.29 OTHER MICROSCOPIC HEMATURIA: ICD-10-CM

## 2023-08-08 LAB
BILIRUB BLD-MCNC: ABNORMAL MG/DL
CLARITY, POC: CLEAR
COLOR UR: YELLOW
CREAT BLDA-MCNC: 1 MG/DL (ref 0.6–1.3)
EXPIRATION DATE: ABNORMAL
GLUCOSE UR STRIP-MCNC: NEGATIVE MG/DL
KETONES UR QL: NEGATIVE
LEUKOCYTE EST, POC: NEGATIVE
Lab: ABNORMAL
NITRITE UR-MCNC: NEGATIVE MG/ML
PH UR: 6 [PH] (ref 5–8)
PROT UR STRIP-MCNC: NEGATIVE MG/DL
RBC # UR STRIP: ABNORMAL /UL
SP GR UR: 1.02 (ref 1–1.03)
UROBILINOGEN UR QL: NORMAL

## 2023-08-08 PROCEDURE — 25510000001 IOPAMIDOL 61 % SOLUTION: Performed by: FAMILY MEDICINE

## 2023-08-08 PROCEDURE — 36415 COLL VENOUS BLD VENIPUNCTURE: CPT | Performed by: FAMILY MEDICINE

## 2023-08-08 PROCEDURE — 74177 CT ABD & PELVIS W/CONTRAST: CPT | Performed by: RADIOLOGY

## 2023-08-08 PROCEDURE — 99214 OFFICE O/P EST MOD 30 MIN: CPT | Performed by: FAMILY MEDICINE

## 2023-08-08 PROCEDURE — 80053 COMPREHEN METABOLIC PANEL: CPT | Performed by: FAMILY MEDICINE

## 2023-08-08 PROCEDURE — 87086 URINE CULTURE/COLONY COUNT: CPT | Performed by: FAMILY MEDICINE

## 2023-08-08 PROCEDURE — 74177 CT ABD & PELVIS W/CONTRAST: CPT

## 2023-08-08 PROCEDURE — 82565 ASSAY OF CREATININE: CPT

## 2023-08-08 PROCEDURE — 81003 URINALYSIS AUTO W/O SCOPE: CPT | Performed by: FAMILY MEDICINE

## 2023-08-08 RX ORDER — TRIAMCINOLONE ACETONIDE 55 UG/1
2 SPRAY, METERED NASAL AS NEEDED
Qty: 16.9 ML | Refills: 2 | Status: SHIPPED | OUTPATIENT
Start: 2023-08-08 | End: 2024-08-07

## 2023-08-08 RX ORDER — ATORVASTATIN CALCIUM 10 MG/1
10 TABLET, FILM COATED ORAL DAILY
Qty: 90 TABLET | Refills: 1 | Status: SHIPPED | OUTPATIENT
Start: 2023-08-08

## 2023-08-08 RX ORDER — SCOLOPAMINE TRANSDERMAL SYSTEM 1 MG/1
1 PATCH, EXTENDED RELEASE TRANSDERMAL
Qty: 10 EACH | Refills: 0 | Status: SHIPPED | OUTPATIENT
Start: 2023-08-08

## 2023-08-08 RX ORDER — PSEUDOEPHEDRINE HYDROCHLORIDE 60 MG/1
60 TABLET, FILM COATED ORAL 2 TIMES DAILY
Qty: 60 TABLET | Refills: 2 | Status: SHIPPED | OUTPATIENT
Start: 2023-08-08

## 2023-08-08 RX ADMIN — IOPAMIDOL 87 ML: 612 INJECTION, SOLUTION INTRAVENOUS at 13:31

## 2023-08-08 NOTE — PROGRESS NOTES
"Mercedes Dale     VITALS: Blood pressure 126/84, pulse 60, temperature 97.5 øF (36.4 øC), temperature source Temporal, height 167.6 cm (66\"), weight 80.1 kg (176 lb 9.6 oz), SpO2 100 %, not currently breastfeeding.    Subjective  Chief Complaint  Flank Pain    Subjective          History of Present Illness:  Patient is a 62 y.o.  female with medical conditions significant for hyperlipidemia, atrial fibrillation, and insomnia who presents to clinic for medical follow-up. She is complaining about flank pain today.    The patient complains of left-sided flank pain for the past 2 months. She denies any tiffany hematuria, but laboratory results showed microscopic hematuria. She drinks at least 64 ounces of water a day. She has tried Robaxin in the past, but she has not had it refilled because she breaks it in half. She is unsure if the medication has helped. She can not take any medication that has codeine in it.    The patient complains of urinary frequency in the evening. She will empty her bladder and then have the urge to urinate again shortly after. She does not check her blood pressure during that time. Her mother's left kidney removed due to being encapsulated.    The patient was given a new sleep medication, but she has not taken it yet because she wants to know how it would interact with her current medications. She read about the medication and found it can lower her blood pressure which was confirmed by her pharmacist. She is sleeping well with Ambien. She adds that melatonin makes her feel irritable and lucid.    The patient complains of tinnitus since she had COVID-19. The tinnitus had improved for a period but recently reappeared. She also mentions that her mind has racing thoughts and that she cannot complete tasks efficiently; she feels like she has attention deficit disorder. She is also feeling more fatigued than usual and complains of hair loss.  No complaints about any of the medications.    The " following portions of the patient's history were reviewed and updated as appropriate: allergies, current medications, past family history, past medical history, past social history, past surgical history and problem list.    Past Medical History  Past Medical History:   Diagnosis Date    Abnormal heart rhythm     Anxiety 09/01/2016    Anxiety     Atrial fibrillation     Back pain     Constipation 09/01/2016    Decreased libido     Diverticulitis     Heart murmur     Heart palpitations     Heartburn     History of echocardiogram     History of Holter monitoring     History of stress test     Hot flashes 09/01/2016    Hyperlipidemia     Hypertension 09/01/2016    Insomnia 09/01/2016    Mitral valve prolapse     Other specified glaucoma     Reflux esophagitis     Sinusitis     GEORGE (stress urinary incontinence, female) 09/01/2016       Surgical History  Past Surgical History:   Procedure Laterality Date    GALLBLADDER SURGERY  2012    KNEE ARTHROSCOPY      scope twice    LAPAROSCOPIC ASSISTED VAGINAL HYSTERECTOMY SALPINGO OOPHORECTOMY      51       Family History  Family History   Problem Relation Age of Onset    Breast cancer Cousin     Stroke Mother     Dementia Mother     Mitral valve prolapse Mother     Heart attack Mother     Hypertension Mother     Cancer Mother     COPD Mother     Kidney disease Mother     Heart attack Father     Heart failure Father     Hypertension Sister     Thyroid disease Sister     Anxiety disorder Sister     Depression Sister     Diabetes Maternal Grandmother     Diabetes Paternal Grandmother        Social History  Social History     Socioeconomic History    Marital status:     Number of children: 2   Tobacco Use    Smoking status: Never     Passive exposure: Never    Smokeless tobacco: Never   Vaping Use    Vaping Use: Never used   Substance and Sexual Activity    Alcohol use: No    Drug use: No    Sexual activity: Yes     Partners: Male     Birth control/protection: Surgical  "      Objective   Vital Signs:   /84 (BP Location: Right arm, Patient Position: Sitting, Cuff Size: Adult)   Pulse 60   Temp 97.5 øF (36.4 øC) (Temporal)   Ht 167.6 cm (66\")   Wt 80.1 kg (176 lb 9.6 oz)   SpO2 100%   BMI 28.50 kg/mý     Physical Exam     Gen: Patient in NAD. Pleasant and answers appropriately. A&Ox3.    Skin: Warm and dry with normal turgor. No purpura, rashes, or unusual pigmentation noted. Hair is normal in appearance and distribution.    HEENT: NC/AT. No lesions noted. Conjunctiva clear, sclera nonicteric. PERRL. EOMI without nystagmus or strabismus. Fundi appear benign. No hemorrhages or exudates of eyes. Auditory canals are patent bilaterally without lesions. TMs intact,  nonerythematous, bulging without lesions. Nasal mucosaerythematous, and nonedematous. Frontal and maxillary sinuses are nontender. O/P nonerythematous and moist without exudate.    Neck: Supple without lymph nodes palpated. FROM.     Lungs: Decreased B/L without rales, rhonchi, crackles, or wheezes.    Heart: Irregularly irregular. S1 and S2 normal. No S3 or S4. No MRGT.    Abd: Soft, nontender,nondistended. (+)BSx4 quadrants.  Left flank pain.    Extrem: No CCE. Radial pulses 2+/4 and equal B/L. FROMx4.  Positive joint tenderness noted.    Neuro: No focal motor/sensory deficits.    Procedures    Result Review :   The following data was reviewed by: Jessica Graham MD on 08/08/2023:                Assessment and Plan    Mercedes Dale is a 62 y.o. here for medical followup.    Problem List Items Addressed This Visit          Cardiac and Vasculature    Hyperlipidemia, unspecified    Relevant Medications    atorvastatin (LIPITOR) 10 MG tablet    Other Relevant Orders    Comprehensive Metabolic Panel     Other Visit Diagnoses       Kidney pain    -  Primary    Relevant Orders    POCT urinalysis dipstick, automated (Completed)    Comprehensive Metabolic Panel    Urine Culture - Urine, Urine, Clean Catch    CT " Abdomen Pelvis With Contrast (Completed)    Eustachian tube dysfunction, bilateral        Relevant Medications    pseudoephedrine (SUDAFED) 60 MG tablet    Triamcinolone Acetonide (Nasacort Allergy 24HR) 55 MCG/ACT nasal inhaler    Other microscopic hematuria        Relevant Orders    Comprehensive Metabolic Panel    Urine Culture - Urine, Urine, Clean Catch    CT Abdomen Pelvis With Contrast (Completed)    Lymphadenopathy        Relevant Orders    CT Abdomen Pelvis With Contrast (Completed)          1. Flank pain.  - Patient will schedule a CT scan of the abdomen and pelvis.  - She was advised to continue taking Robaxin.  - She was advised to drink plenty of water.  - She was advised to go to the emergency room if she develops a fever or she can not urinate.    2. Insomnia.  - The patient was advised to take half a pill of clonidine.  - If there are no side effects or reduced efficacy, she can take the whole pill.  - If she is taking a whole pill, she will monitor her blood pressure.  - She was advised if she takes a whole pill, she may want to skip Cardizem for that day, and take it the next morning if her blood pressure is elevated.  - She was advised to continue taking the flecainide.    4. Hair loss.  - The patient was advised to add biotin and prenatal vitamins to her daily regime.    5. Hypertension.  - The patient was advised to monitor her blood pressure daily.  - If it is elevated, she will call the clinic.    6. Health maintenance.  - Labs ordered  - CT scan ordered                Follow Up   Return in about 3 months (around 11/8/2023), or LABS.  Findings and plans discussed with patient who verbalizes understanding and agreement. Will followup with patient once results are in. Patient was given instructions and counseling regarding her condition or for health maintenance advice. Please see specific information pulled into the AVS if appropriate.     Jessica Graham MD      Transcribed from ambient  dictation for Jessica Graham MD by Rocco Roach  08/08/23   14:48 EDT    Patient or patient representative verbalized consent to the visit recording.  I have personally performed the services described in this document as transcribed by the above individual, and it is both accurate and complete.

## 2023-08-09 LAB
ALBUMIN SERPL-MCNC: 4.7 G/DL (ref 3.5–5.2)
ALBUMIN/GLOB SERPL: 1.6 G/DL
ALP SERPL-CCNC: 55 U/L (ref 39–117)
ALT SERPL W P-5'-P-CCNC: 29 U/L (ref 1–33)
ANION GAP SERPL CALCULATED.3IONS-SCNC: 10 MMOL/L (ref 5–15)
AST SERPL-CCNC: 27 U/L (ref 1–32)
BACTERIA SPEC AEROBE CULT: NO GROWTH
BILIRUB SERPL-MCNC: 0.6 MG/DL (ref 0–1.2)
BUN SERPL-MCNC: 17 MG/DL (ref 8–23)
BUN/CREAT SERPL: 18.1 (ref 7–25)
CALCIUM SPEC-SCNC: 9.5 MG/DL (ref 8.6–10.5)
CHLORIDE SERPL-SCNC: 104 MMOL/L (ref 98–107)
CO2 SERPL-SCNC: 25 MMOL/L (ref 22–29)
CREAT SERPL-MCNC: 0.94 MG/DL (ref 0.57–1)
EGFRCR SERPLBLD CKD-EPI 2021: 68.7 ML/MIN/1.73
GLOBULIN UR ELPH-MCNC: 3 GM/DL
GLUCOSE SERPL-MCNC: 107 MG/DL (ref 65–99)
POTASSIUM SERPL-SCNC: 4.2 MMOL/L (ref 3.5–5.2)
PROT SERPL-MCNC: 7.7 G/DL (ref 6–8.5)
SODIUM SERPL-SCNC: 139 MMOL/L (ref 136–145)

## 2023-08-14 ENCOUNTER — OFFICE VISIT (OUTPATIENT)
Dept: CARDIOLOGY | Facility: CLINIC | Age: 63
End: 2023-08-14
Payer: COMMERCIAL

## 2023-08-14 VITALS
HEART RATE: 57 BPM | BODY MASS INDEX: 28.12 KG/M2 | WEIGHT: 175 LBS | DIASTOLIC BLOOD PRESSURE: 78 MMHG | OXYGEN SATURATION: 97 % | SYSTOLIC BLOOD PRESSURE: 132 MMHG | HEIGHT: 66 IN

## 2023-08-14 DIAGNOSIS — I10 PRIMARY HYPERTENSION: ICD-10-CM

## 2023-08-14 DIAGNOSIS — I48.0 PAROXYSMAL ATRIAL FIBRILLATION: Primary | ICD-10-CM

## 2023-08-14 PROCEDURE — 99213 OFFICE O/P EST LOW 20 MIN: CPT | Performed by: INTERNAL MEDICINE

## 2023-08-14 NOTE — PROGRESS NOTES
[facility]iology  Office Progress Note  Mercedes Dale  1960  [address] [place] [address]       Visit Date: 08/14/23    PCP: Jessica Graham MD  [address] [place] [address]    IDENTIFICATION: A 62 y.o. 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    PROBLEM LIST:   Chest pain  8/21 exercise MPS: Exercise Cardiolite WNL.  LVEF >70%.   Expected = 6:55.  Actual = 8:45.   Low risk test.  Palpitations/paroxysmal atrial fibrillation  9/5/18 echo: EF 60% mild AI  9/18 Event monitor - short lived afib  HL  9/10/18   HDL 40 LDL 88, on atorvastatin  6/21 135/379/29/67  Hypertension  GERD      CC:   Chief Complaint   Patient presents with    Paroxysmal atrial fibrillation     1 year follow up       Allergies  Allergies   Allergen Reactions    Codeine Nausea And Vomiting    Hydrocodone Nausea And Vomiting    Morphine Nausea And Vomiting    Azithromycin Other (See Comments)     Blisters in mouth and nose    Adhesive Tape Itching    Penicillins Other (See Comments)     Intolerance       Current Medications    Current Outpatient Medications:     atorvastatin (LIPITOR) 10 MG tablet, Take 1 tablet by mouth Daily., Disp: 90 tablet, Rfl: 1    cetirizine (zyrTEC) 10 MG tablet, Take 1 tablet by mouth Daily. (Patient taking differently: Take 1 tablet by mouth As Needed.), Disp: 14 tablet, Rfl: 0    dilTIAZem CD (CARDIZEM CD) 120 MG 24 hr capsule, TAKE ONE CAPSULE BY MOUTH EVERY DAY, Disp: 90 capsule, Rfl: 5    Eliquis 5 MG tablet tablet, TAKE ONE TABLET BY MOUTH EVERY 12 HOURS, Disp: 180 tablet, Rfl: 3    estradiol (ESTRACE) 0.5 MG tablet, Take 1 tablet by mouth Daily., Disp: 90 tablet, Rfl: 3    flecainide (TAMBOCOR) 50 MG tablet, Take 1 tablet by mouth 2 (Two) Times a Day., Disp: 180 tablet, Rfl: 1    methocarbamol (ROBAXIN) 500 MG tablet, Take 1 tablet by mouth 2 (Two) Times a Day As Needed for Muscle Spasms., Disp: 30 tablet, Rfl: 0    Multiple Vitamins-Minerals (THRIVE FOR  "LIFE WOMENS) tablet, Take 1 tablet by mouth Daily., Disp: , Rfl:     pseudoephedrine (SUDAFED) 60 MG tablet, Take 1 tablet by mouth 2 (Two) Times a Day. (Patient taking differently: Take 1 tablet by mouth As Needed.), Disp: 60 tablet, Rfl: 2    Scopolamine 1 MG/3DAYS patch, Place 1 patch on the skin as directed by provider Every 72 (Seventy-Two) Hours., Disp: 10 each, Rfl: 0    timolol (TIMOPTIC) 0.25 % ophthalmic solution, 1 drop 2 (Two) Times a Day., Disp: , Rfl:     Triamcinolone Acetonide (Nasacort Allergy 24HR) 55 MCG/ACT nasal inhaler, 2 sprays into the nostril(s) as directed by provider As Needed for Allergies., Disp: 16.9 mL, Rfl: 2    zolpidem (AMBIEN) 5 MG tablet, Take 1 tablet by mouth At Night As Needed for Sleep., Disp: 30 tablet, Rfl: 5    cloNIDine (Catapres) 0.1 MG tablet, Take 1 tablet by mouth Every Night. (Patient not taking: Reported on 8/8/2023), Disp: 30 tablet, Rfl: 3      History of Present Illness   Mercedes Dale is a 62 y.o. year old female here for follow up.  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  She notes no overt chest symptoms.  She is attempting nocturnal clonidine and lieu of Ambien    OBJECTIVE:  Vitals:    08/14/23 1438   BP: 132/78   BP Location: Right arm   Patient Position: Sitting   Pulse: 57   SpO2: 97%   Weight: 79.4 kg (175 lb)   Height: 167.6 cm (66\")     Body mass index is 28.25 kg/mý.    Constitutional:       Appearance: Healthy appearance. Not in distress.   Neck:      Vascular: No JVR. JVD normal.   Pulmonary:      Effort: Pulmonary effort is normal.      Breath sounds: Normal breath sounds. No wheezing. No rhonchi. No rales.   Chest:      Chest wall: Not tender to palpatation.   Cardiovascular:      PMI at left midclavicular line. Normal rate. Regular rhythm. Normal S1. Normal S2.       Murmurs: There is no murmur.      No gallop.  No click. No rub.   Pulses:     Intact distal pulses.   Edema:     Peripheral edema absent.   Abdominal:    "   General: Bowel sounds are normal.      Palpations: Abdomen is soft.      Tenderness: There is no abdominal tenderness.   Musculoskeletal: Normal range of motion.         General: No tenderness. Skin:     General: Skin is warm and dry.   Neurological:      General: No focal deficit present.      Mental Status: Alert and oriented to person, place and time.       Diagnostic Data:  Procedures      ASSESSMENT:   Diagnosis Plan   1. Paroxysmal atrial fibrillation        2. Primary hypertension              PLAN:  Paroxysmal A. fib [id number] will decrease Cartia as she has asymptomatic bradycardia at current 120 mg daily.  Continue flecainide and Eliquis    Hypertension controlled on Cartia aforementioned we will decrease dosing            Jon Ramirez MD, FACC

## 2023-08-23 DIAGNOSIS — G47.09 OTHER INSOMNIA: Primary | ICD-10-CM

## 2023-08-23 RX ORDER — ZOLPIDEM TARTRATE 10 MG/1
5 TABLET ORAL NIGHTLY PRN
Qty: 15 TABLET | Refills: 3 | Status: SHIPPED | OUTPATIENT
Start: 2023-08-23

## 2023-10-13 DIAGNOSIS — S39.012A STRAIN OF TENDON OF LOWER BACK: ICD-10-CM

## 2023-10-13 RX ORDER — METHOCARBAMOL 500 MG/1
500 TABLET, FILM COATED ORAL 2 TIMES DAILY PRN
Qty: 30 TABLET | Refills: 0 | Status: SHIPPED | OUTPATIENT
Start: 2023-10-13

## 2023-10-16 DIAGNOSIS — G47.09 OTHER INSOMNIA: ICD-10-CM

## 2023-10-16 RX ORDER — ZOLPIDEM TARTRATE 10 MG/1
5 TABLET ORAL NIGHTLY PRN
Qty: 15 TABLET | Refills: 3 | Status: SHIPPED | OUTPATIENT
Start: 2023-10-16

## 2023-10-20 DIAGNOSIS — G47.09 OTHER INSOMNIA: ICD-10-CM

## 2023-10-20 RX ORDER — ZOLPIDEM TARTRATE 5 MG/1
5 TABLET ORAL NIGHTLY PRN
Qty: 15 TABLET | Refills: 5 | OUTPATIENT
Start: 2023-10-20

## 2023-11-08 ENCOUNTER — OFFICE VISIT (OUTPATIENT)
Dept: PULMONOLOGY | Facility: CLINIC | Age: 63
End: 2023-11-08
Payer: COMMERCIAL

## 2023-11-08 VITALS
HEART RATE: 64 BPM | TEMPERATURE: 98.4 F | OXYGEN SATURATION: 97 % | HEIGHT: 66 IN | BODY MASS INDEX: 28.28 KG/M2 | SYSTOLIC BLOOD PRESSURE: 102 MMHG | DIASTOLIC BLOOD PRESSURE: 74 MMHG | WEIGHT: 176 LBS

## 2023-11-08 DIAGNOSIS — G47.09 OTHER INSOMNIA: Primary | ICD-10-CM

## 2023-11-08 DIAGNOSIS — E66.3 OVERWEIGHT: ICD-10-CM

## 2023-11-08 RX ORDER — ZOLPIDEM TARTRATE 10 MG/1
10 TABLET ORAL NIGHTLY PRN
Qty: 30 TABLET | Refills: 5 | Status: SHIPPED | OUTPATIENT
Start: 2023-11-08

## 2023-11-08 NOTE — PROGRESS NOTES
"Chief Complaint  Insomnia (Ambien seems to be helping. )    Subjective        Mercedes Dale presents to [facility] PULMONARY & CRITICAL CARE MEDICINE  History of Present Illness'    Ms. Dale is a 62 year old female with a medical history significant for anxiety, insomnia, hypertension, atrial fibrillation, and hyperlipidemia.    She presents for follow up on insomnia.  She reports that she did try to take clonidine for her sleep and that it did help her fall asleep but that she did not stay asleep.  She reports that she would wake up about 30 minutes after and not be able to fall back asleep.  She state that she is taking the Ambien and that it is helping her to sleep at night. She was unable to complete sleep study due to having COVID.    Objective   Vital Signs:  /74 (BP Location: Left arm, Patient Position: Sitting)   Pulse 64   Temp 98.4 °F (36.9 °C)   Ht 167.6 cm (66\")   Wt 79.8 kg (176 lb)   SpO2 97%   BMI 28.41 kg/m²   Estimated body mass index is 28.41 kg/m² as calculated from the following:    Height as of this encounter: 167.6 cm (66\").    Weight as of this encounter: 79.8 kg (176 lb).               Physical Exam     GENERAL APPEARANCE: Well developed, well nourished, alert and cooperative, and appears to be in no acute distress.    HEAD: normocephalic. Atraumatic.    EYES: PERRL, EOMI. Vision is grossly intact.    THROAT: Oral cavity and pharynx normal. No inflammation, swelling, exudate, or lesions.     NECK: Neck supple.  No thyromegaly.    CARDIAC: Normal S1 and S2. No S3, S4 or murmurs. Rhythm is regular.     RESPIRATORY:Bilateral air entry positive. Bilateral diminished breath sounds. No wheezing, crackles or rhonchi noted.    GI: Positive bowel sounds. Soft, nondistended, nontender.     MUSCULOSKELETAL: No significant deformity or joint abnormality. No edema. Peripheral pulses intact. No varicosities.    NEUROLOGICAL: Strength and sensation symmetric and intact " throughout.     PSYCHIATRIC: The mental examination revealed the patient was oriented to person, place, and time.     Result Review :  The following data was reviewed by: DARIO Junior on 11/08/2023:  Common labs          2/7/2023    12:38 5/8/2023    10:52 8/8/2023    11:25 8/8/2023    13:29   Common Labs   Glucose 86  106  107     BUN 21  16  17     Creatinine 1.12  1.17  0.94  1.00    Sodium 141  139  139     Potassium 3.9  3.9  4.2     Chloride 105  103  104     Calcium 9.3  9.6  9.5     Albumin 4.2  4.4  4.7     Total Bilirubin 0.5  0.7  0.6     Alkaline Phosphatase 51  49  55     AST (SGOT) 28  25  27     ALT (SGPT) 35  33  29     WBC 8.02       Hemoglobin 13.1       Hematocrit 39.2       Platelets 253                      Assessment and Plan   Diagnoses and all orders for this visit:    1. Other insomnia (Primary)  -     zolpidem (AMBIEN) 10 MG tablet; Take 1 tablet by mouth At Night As Needed for Sleep.  Dispense: 30 tablet; Refill: 5    2. Overweight        Continue Ambien 10 mg nightly.  Refills sent.  Will see her back in 6 months and assess need for sleep study at this time.    The problem of recurrent insomnia is discussed. Avoidance of caffeine sources is strongly encouraged. Sleep hygiene issues are reviewed.         Follow Up   Return in about 6 months (around 5/8/2024).  Patient was given instructions and counseling regarding her condition or for health maintenance advice. Please see specific information pulled into the AVS if appropriate.

## 2023-11-21 ENCOUNTER — OFFICE VISIT (OUTPATIENT)
Dept: FAMILY MEDICINE CLINIC | Facility: CLINIC | Age: 63
End: 2023-11-21
Payer: COMMERCIAL

## 2023-11-21 VITALS
TEMPERATURE: 97.5 F | HEIGHT: 66 IN | BODY MASS INDEX: 29.83 KG/M2 | DIASTOLIC BLOOD PRESSURE: 76 MMHG | HEART RATE: 56 BPM | OXYGEN SATURATION: 99 % | SYSTOLIC BLOOD PRESSURE: 134 MMHG | WEIGHT: 185.6 LBS

## 2023-11-21 DIAGNOSIS — E78.49 OTHER HYPERLIPIDEMIA: ICD-10-CM

## 2023-11-21 DIAGNOSIS — H69.93 EUSTACHIAN TUBE DYSFUNCTION, BILATERAL: ICD-10-CM

## 2023-11-21 DIAGNOSIS — G47.09 OTHER INSOMNIA: Primary | ICD-10-CM

## 2023-11-21 DIAGNOSIS — Z23 IMMUNIZATION DUE: ICD-10-CM

## 2023-11-21 DIAGNOSIS — R53.83 OTHER FATIGUE: ICD-10-CM

## 2023-11-21 PROCEDURE — 82306 VITAMIN D 25 HYDROXY: CPT | Performed by: FAMILY MEDICINE

## 2023-11-21 PROCEDURE — 82607 VITAMIN B-12: CPT | Performed by: FAMILY MEDICINE

## 2023-11-21 PROCEDURE — 36415 COLL VENOUS BLD VENIPUNCTURE: CPT | Performed by: FAMILY MEDICINE

## 2023-11-21 PROCEDURE — 84443 ASSAY THYROID STIM HORMONE: CPT | Performed by: FAMILY MEDICINE

## 2023-11-21 PROCEDURE — 80061 LIPID PANEL: CPT | Performed by: FAMILY MEDICINE

## 2023-11-21 PROCEDURE — 80053 COMPREHEN METABOLIC PANEL: CPT | Performed by: FAMILY MEDICINE

## 2023-11-21 PROCEDURE — 83735 ASSAY OF MAGNESIUM: CPT | Performed by: FAMILY MEDICINE

## 2023-11-21 RX ORDER — NORTRIPTYLINE HYDROCHLORIDE 10 MG/1
10 CAPSULE ORAL NIGHTLY
Qty: 30 CAPSULE | Refills: 0 | Status: SHIPPED | OUTPATIENT
Start: 2023-11-21

## 2023-11-21 RX ORDER — PSEUDOEPHEDRINE HYDROCHLORIDE 60 MG/1
60 TABLET, FILM COATED ORAL 2 TIMES DAILY
Qty: 60 TABLET | Refills: 2 | Status: SHIPPED | OUTPATIENT
Start: 2023-11-21

## 2023-11-21 RX ORDER — CYANOCOBALAMIN 1000 UG/ML
1000 INJECTION, SOLUTION INTRAMUSCULAR; SUBCUTANEOUS ONCE
Status: COMPLETED | OUTPATIENT
Start: 2023-11-21 | End: 2023-11-21

## 2023-11-21 RX ADMIN — CYANOCOBALAMIN 1000 MCG: 1000 INJECTION, SOLUTION INTRAMUSCULAR; SUBCUTANEOUS at 12:08

## 2023-11-21 NOTE — PROGRESS NOTES
"Mercedes Dale     VITALS: Blood pressure 134/76, pulse 56, temperature 97.5 °F (36.4 °C), temperature source Temporal, height 167.6 cm (66\"), weight 84.2 kg (185 lb 9.6 oz), SpO2 99%, not currently breastfeeding.    Subjective  Chief Complaint  Fatigue    Subjective          History of Present Illness:  Patient is a 62 y.o. female  with medical conditions significant for hyperlipidemia, atrial fibrillation, and insomnia who presents to clinic for medical follow-up.    The patient has had a cough for 3 weeks and it is causing her to trigger her gag reflex. It is a little yellow and sometimes when she wakes up, it is white phlegm. She was told that she has a dry cough.  She thinks she has seasonal allergies. She would run and sneeze for about 10 days. She took 2 COVID-19 tests, which were negative.    The patient has low energy. Her last B12 injection was 10 years ago. She has no get up and go run even though she is doing the Thrive. If she does not do the Thrive, she is wiped out and some mornings she gets up and her feet are hurting. This has happened for several years, but it has not been anything constant. She can do the Thrive and her aches and pains have gone away. She knows it is full of vitamins and has some caffeine.    The patient has received her influenza vaccine.    The patient still has pain. She went to her cardiologist and had a follow-up with him. He told her that she has some major mumbo going in her lower back. She does not want to do any tests right now because of her insurance situation. She has gained weight. She would like to do physical therapy in the meantime. The muscle relaxer is improving her symptoms. She can feel it for about an hour. She feels a dulling pain.    The patient has dizziness.    The patient has had pain injections for 10 to 12 years.    The patient has atrial fibrillation. She does not feel like her blood pressure goes up, but she does feel like her atrial fibrillation " starts to do a little something.  She has not had a Holter monitor since 2020.    She has tried clonidine, Ambien, melatonin, trazodone, and Remeron.    The following portions of the patient's history were reviewed and updated as appropriate: allergies, current medications, past family history, past medical history, past social history, past surgical history and problem list.    Past Medical History  Past Medical History:   Diagnosis Date    Abnormal heart rhythm     Anxiety 09/01/2016    Anxiety     Atrial fibrillation     Back pain     Constipation 09/01/2016    Decreased libido     Diverticulitis     Heart murmur     Heart palpitations     Heartburn     History of echocardiogram     History of Holter monitoring     History of stress test     Hot flashes 09/01/2016    Hyperlipidemia     Hypertension 09/01/2016    Insomnia 09/01/2016    Mitral valve prolapse     Other specified glaucoma     Reflux esophagitis     Sinusitis     GEORGE (stress urinary incontinence, female) 09/01/2016       Surgical History  Past Surgical History:   Procedure Laterality Date    GALLBLADDER SURGERY  2012    KNEE ARTHROSCOPY      scope twice    LAPAROSCOPIC ASSISTED VAGINAL HYSTERECTOMY SALPINGO OOPHORECTOMY      51       Family History  Family History   Problem Relation Age of Onset    Breast cancer Cousin     Stroke Mother     Dementia Mother     Mitral valve prolapse Mother     Heart attack Mother     Hypertension Mother     Cancer Mother     COPD Mother     Kidney disease Mother     Heart attack Father     Heart failure Father     Hypertension Sister     Thyroid disease Sister     Anxiety disorder Sister     Depression Sister     Diabetes Maternal Grandmother     Diabetes Paternal Grandmother        Social History  Social History     Socioeconomic History    Marital status:     Number of children: 2   Tobacco Use    Smoking status: Never     Passive exposure: Never    Smokeless tobacco: Never   Vaping Use    Vaping Use: Never  "used   Substance and Sexual Activity    Alcohol use: No    Drug use: No    Sexual activity: Yes     Partners: Male     Birth control/protection: Surgical       Objective   Vital Signs:   /76 (BP Location: Right arm, Patient Position: Sitting, Cuff Size: Adult)   Pulse 56   Temp 97.5 °F (36.4 °C) (Temporal)   Ht 167.6 cm (66\")   Wt 84.2 kg (185 lb 9.6 oz)   SpO2 99%   BMI 29.96 kg/m²     Physical Exam     Gen: Patient in NAD. Pleasant and answers appropriately. A&Ox3.    Skin: Warm and dry with normal turgor. No purpura, rashes, or unusual pigmentation noted. Hair is normal in appearance and distribution.    HEENT: NC/AT. No lesions noted. Conjunctiva clear, sclera nonicteric. PERRL. EOMI without nystagmus or strabismus. Fundi appear benign. No hemorrhages or exudates of eyes. Auditory canals are patent bilaterally without lesions. TMs intact,  nonerythematous, bulging without lesions. Nasal mucosa pink, nonerythematous, and nonedematous. Frontal and maxillary sinuses are nontender. O/P nonerythematous and moist without exudate.    Neck: Supple without lymph nodes palpated. FROM.     Lungs: CTA B/L without rales, rhonchi, crackles, or wheezes.    Heart: Irregularly irregular. S1 and S2 normal. No S3 or S4. No MRGT.    Abd: Soft, nontender,nondistended. (+)BSx4 quadrants.     Extrem: No CCE. Radial pulses 2+/4 and equal B/L. FROMx4.  Positive joint tenderness noted.    Neuro: No focal motor/sensory deficits.    Procedures         Assessment and Plan    This is a 62-year-old female who presents to clinic for medical follow-up.    Diagnoses and all orders for this visit:    1. Other insomnia (Primary)  -     Comprehensive Metabolic Panel; Future  -     nortriptyline (PAMELOR) 10 MG capsule; Take 1 capsule by mouth Every Night.  Dispense: 30 capsule; Refill: 0  -     Comprehensive Metabolic Panel    2. Other fatigue  -     Comprehensive Metabolic Panel; Future  -     Vitamin D,25-Hydroxy; Future  -     TSH; " Future  -     Magnesium; Future  -     Vitamin B12; Future  -     cyanocobalamin injection 1,000 mcg  -     Comprehensive Metabolic Panel  -     Vitamin D,25-Hydroxy  -     TSH  -     Magnesium  -     Vitamin B12    3. Other hyperlipidemia  -     Comprehensive Metabolic Panel; Future  -     Lipid Panel; Future  -     Comprehensive Metabolic Panel  -     Lipid Panel    4. Eustachian tube dysfunction, bilateral  -     pseudoephedrine (SUDAFED) 60 MG tablet; Take 1 tablet by mouth 2 (Two) Times a Day.  Dispense: 60 tablet; Refill: 2    5.  Immunization due  -     Fluzone >6 Months ([id number])        Problem List Items Addressed This Visit          Cardiac and Vasculature    Hyperlipidemia, unspecified    Relevant Orders    Comprehensive Metabolic Panel    Lipid Panel       Sleep    Insomnia - Primary    Relevant Medications    nortriptyline (PAMELOR) 10 MG capsule    Other Relevant Orders    Comprehensive Metabolic Panel     Other Visit Diagnoses       Other fatigue        Relevant Medications    cyanocobalamin injection 1,000 mcg (Completed)    Other Relevant Orders    Comprehensive Metabolic Panel    Vitamin D,25-Hydroxy    TSH    Magnesium    Vitamin B12    Eustachian tube dysfunction, bilateral        Relevant Medications    pseudoephedrine (SUDAFED) 60 MG tablet                   Follow Up   Return in about 3 months (around 2/21/2024), or LABS.  Findings and plans discussed with patient who verbalizes understanding and agreement. Will followup with patient once results are in. Patient was given instructions and counseling regarding her condition or for health maintenance advice. Please see specific information pulled into the AVS if appropriate.       Transcribed from ambient dictation for Jessica Graham MD by Pinky Wade.  11/21/23   13:13 EST    Patient or patient representative verbalized consent to the visit recording.  I have personally performed the services described in this document as transcribed by  the above individual, and it is both accurate and complete.

## 2023-11-22 LAB
25(OH)D3 SERPL-MCNC: 25.7 NG/ML (ref 30–100)
ALBUMIN SERPL-MCNC: 4.6 G/DL (ref 3.5–5.2)
ALBUMIN/GLOB SERPL: 1.6 G/DL
ALP SERPL-CCNC: 55 U/L (ref 39–117)
ALT SERPL W P-5'-P-CCNC: 39 U/L (ref 1–33)
ANION GAP SERPL CALCULATED.3IONS-SCNC: 10 MMOL/L (ref 5–15)
AST SERPL-CCNC: 27 U/L (ref 1–32)
BILIRUB SERPL-MCNC: 0.5 MG/DL (ref 0–1.2)
BUN SERPL-MCNC: 16 MG/DL (ref 8–23)
BUN/CREAT SERPL: 18.2 (ref 7–25)
CALCIUM SPEC-SCNC: 9.6 MG/DL (ref 8.6–10.5)
CHLORIDE SERPL-SCNC: 105 MMOL/L (ref 98–107)
CHOLEST SERPL-MCNC: 152 MG/DL (ref 0–200)
CO2 SERPL-SCNC: 26 MMOL/L (ref 22–29)
CREAT SERPL-MCNC: 0.88 MG/DL (ref 0.57–1)
EGFRCR SERPLBLD CKD-EPI 2021: 74.4 ML/MIN/1.73
GLOBULIN UR ELPH-MCNC: 2.8 GM/DL
GLUCOSE SERPL-MCNC: 110 MG/DL (ref 65–99)
HDLC SERPL-MCNC: 33 MG/DL (ref 40–60)
LDLC SERPL CALC-MCNC: 76 MG/DL (ref 0–100)
LDLC/HDLC SERPL: 2.02 {RATIO}
MAGNESIUM SERPL-MCNC: 2.5 MG/DL (ref 1.6–2.4)
POTASSIUM SERPL-SCNC: 4.8 MMOL/L (ref 3.5–5.2)
PROT SERPL-MCNC: 7.4 G/DL (ref 6–8.5)
SODIUM SERPL-SCNC: 141 MMOL/L (ref 136–145)
TRIGL SERPL-MCNC: 262 MG/DL (ref 0–150)
TSH SERPL DL<=0.05 MIU/L-ACNC: 0.03 UIU/ML (ref 0.27–4.2)
VIT B12 BLD-MCNC: >2000 PG/ML (ref 211–946)
VLDLC SERPL-MCNC: 43 MG/DL (ref 5–40)

## 2023-12-22 ENCOUNTER — TELEPHONE (OUTPATIENT)
Dept: FAMILY MEDICINE CLINIC | Facility: CLINIC | Age: 63
End: 2023-12-22
Payer: COMMERCIAL

## 2023-12-22 NOTE — TELEPHONE ENCOUNTER
PT CALLED TO INFORM DR. PICKETT THAT THE B12 SHOT WORKED AND WOULD LIKE TO CONTINUE WITH THE B12 SHOTS.

## 2023-12-27 ENCOUNTER — CLINICAL SUPPORT (OUTPATIENT)
Dept: FAMILY MEDICINE CLINIC | Facility: CLINIC | Age: 63
End: 2023-12-27
Payer: COMMERCIAL

## 2023-12-27 DIAGNOSIS — E53.8 B12 DEFICIENCY: Primary | ICD-10-CM

## 2023-12-27 PROCEDURE — 96372 THER/PROPH/DIAG INJ SC/IM: CPT | Performed by: FAMILY MEDICINE

## 2023-12-27 RX ORDER — CYANOCOBALAMIN 1000 UG/ML
1000 INJECTION, SOLUTION INTRAMUSCULAR; SUBCUTANEOUS ONCE
Status: COMPLETED | OUTPATIENT
Start: 2023-12-27 | End: 2023-12-27

## 2023-12-27 RX ADMIN — CYANOCOBALAMIN 1000 MCG: 1000 INJECTION, SOLUTION INTRAMUSCULAR; SUBCUTANEOUS at 12:44

## 2024-01-23 ENCOUNTER — OFFICE VISIT (OUTPATIENT)
Dept: OBSTETRICS AND GYNECOLOGY | Facility: CLINIC | Age: 64
End: 2024-01-23
Payer: COMMERCIAL

## 2024-01-23 VITALS
SYSTOLIC BLOOD PRESSURE: 120 MMHG | DIASTOLIC BLOOD PRESSURE: 70 MMHG | HEIGHT: 66 IN | WEIGHT: 185 LBS | BODY MASS INDEX: 29.73 KG/M2

## 2024-01-23 DIAGNOSIS — N39.3 SUI (STRESS URINARY INCONTINENCE, FEMALE): ICD-10-CM

## 2024-01-23 DIAGNOSIS — N81.6 RECTOCELE: ICD-10-CM

## 2024-01-23 DIAGNOSIS — Z13.820 SCREENING FOR OSTEOPOROSIS: ICD-10-CM

## 2024-01-23 DIAGNOSIS — Z12.31 BREAST CANCER SCREENING BY MAMMOGRAM: ICD-10-CM

## 2024-01-23 DIAGNOSIS — Z01.419 WELL WOMAN EXAM WITH ROUTINE GYNECOLOGICAL EXAM: Primary | ICD-10-CM

## 2024-01-23 DIAGNOSIS — N95.9 POSTMENOPAUSAL SYMPTOMS: ICD-10-CM

## 2024-01-23 PROCEDURE — 99396 PREV VISIT EST AGE 40-64: CPT | Performed by: NURSE PRACTITIONER

## 2024-01-23 PROCEDURE — 99459 PELVIC EXAMINATION: CPT | Performed by: NURSE PRACTITIONER

## 2024-01-23 PROCEDURE — 99213 OFFICE O/P EST LOW 20 MIN: CPT | Performed by: NURSE PRACTITIONER

## 2024-01-23 RX ORDER — CAL/D3/MAG11/ZINC/COP/MANG/BOR 600 MG-800
1 TABLET ORAL DAILY
Qty: 90 TABLET | Refills: 3 | Status: SHIPPED | OUTPATIENT
Start: 2024-01-23

## 2024-01-23 NOTE — PROGRESS NOTES
Subjective   Chief Complaint   Patient presents with    Annual Exam     Well woman exam     Mercedes Dale is a 63 y.o. year old  menopausal female s/p hysterectomy  presenting to be seen for her annual exam.  This past year she has been on hormone replacement therapy.  There has not been vaginal bleeding in the last 12 months.  Menopausal symptoms  are well controlled with current estrace dose, She is interested in weaning  .  She previously decreased the dose to 0.25 mg and had return of her vasomotor symptoms.  She is currently taking 0.5 mg daily.She is current on her colon cancer screening until .  She had a BI-RADS 3 diagnostic mammogram 2022 with recommendation for 12-month diagnostic mammogram follow-up.  She has not yet had a DEXA scan.  She works as a . She is going on a cruise next week!  SEXUAL Hx:  She is currently sexually active.  In the past year there there has been NO new sexual partners.    Condoms are never used.  She would not like to be screened for STD's at today's exam.  [place] is painful: no  HEALTH Hx:  She exercises regularly: no (but is planning to start exercising more).  Walks about 10-12,000 steps a day.  She wears her seat belt: yes.  She has concerns about domestic violence: no.  She has noticed changes in height: no.  OTHER THINGS SHE WANTS TO DISCUSS TODAY:  Nothing else    The following portions of the patient's history were reviewed and updated as appropriate:problem list, current medications, allergies, past family history, past medical history, past social history, and past surgical history.    Social History    Tobacco Use      Smoking status: Never      Smokeless tobacco: Never      Review of Systems  Constitutional POS: nothing reported    NEG: anorexia or night sweats   Genitourinary POS: GEORGE is present and it IS effecting her ADL's feels this may be related to constipation and rectocele.    NEG: dysuria or hematuria       Gastointestinal POS: constipation (chronic)    NEG: bloating, change in bowel habits, melena, or reflux symptoms   Integument POS: nothing reported    NEG: moles that are changing in size, shape, color or rashes   Breast POS: nothing reported    NEG: persistent breast lump, skin dimpling, or nipple discharge        Objective   There were no vitals taken for this visit.    General:  well developed; well nourished  no acute distress   Skin:  No suspicious lesions seen   Thyroid: normal to inspection and palpation   Breasts:  Examined in supine position  Symmetric without masses or skin dimpling  Nipples normal without inversion, lesions or discharge  There are no palpable axillary nodes  Dense fibrous tissue noted bilaterally    Abdomen: soft, non-tender; no masses  no umbilical or inguinal hernias are present  no hepato-splenomegaly   Pelvis: Clinical staff was present for exam  External genitalia:  normal appearance of the external genitalia including Bartholin's and Clarion's glands.  :  urethral meatus normal;  Vaginal:  normal pink mucosa without prolapse or lesions.  Cervix:  absent.  Uterus:  absent.  Adnexa:  absent, bilateral.  Rectal:  digital rectal exam not performed; anus visually normal appearing. external hemorrhoids present;  Rectocele GRADE 2  Varicosities noted on the left labia no change from last year.  Patient denies any pain        Assessment   Well woman with routine gynecological exam  Stress urinary incontinence worsened over the last 12 months  Constipation and rectocele  She is up to date on all relevant gynecologic and colorectal screenings except mammography and DEXA's for osteoporosis       Plan   Pap was not done today.  I explained to Mercedes that the Pap smears are no longer recommended in patient's after hysterectomy.   I stressed to Mercedes that she still should be seen to be seen yearly for a full physical including breast and pelvic exam.  She was encouraged to get yearly  mammograms.  She should report any palpable breast lump(s) or skin changes regardless of mammographic findings.  I explained to Mercedes that notification regarding her mammogram results will come from the center performing the study.  Our office will not be routinely calling with mammogram results.  It is her responsibility to make sure that the results from the mammogram are communicated to her by the breast center.  If she has any questions about the results, she is welcome to call our office anytime.  Due to her previous BI-RADS 3 mammogram recommendation for diagnostic mammogram this year.  Will await results of next mammogram to see if future mammograms need to be diagnostic or she can return to routine screening.  Discussed stress urinary incontinence symptoms.  She has used pelvic floor physical therapy in the past and done well.  Referral sent.  Discussed increasing fiber and consistent use of stool softener to avoid constipation to help with her rectocele, hemorrhoids, and stress urinary incontinence symptoms.  The importance of keeping all planned follow-up and taking all medications as prescribed was emphasized.  Today I discussed with Mercedes the total recommended calcium intake for a post-menopausal female is 1200 mg.  Ideally this should be from dietary sources.  I reviewed calcium content in various foods including milk, fortified orange juice and yogurt.  If she cannot get sufficient calcium through dietary means, it is recommended to supplement with either a multivitamin or calcium to reach her daily goal.  I also reviewed the difference in the bioavailability of calcium carbonate and calcium citrate containing supplements and the importance of taking calcium carbonate containing products with food. Finally, vitamin D's role in calcium absorption was reviewed and a total daily vitamin D intake of 600 units was recommended.  Her vaccine record was reviewed and updated.  Bone density testing was  recommended.  I reviewed with Mercedes that it was always most advisable for all bone density tests for each patient to be done on the same machine over time.  The purpose of this is to improve the accuracy of the interpretation of serial studies. Order sent today, she would like to have done at [facility].  Follow up for annual exam 1 year    No orders of the defined types were placed in this encounter.         This note was electronically signed.  Savannah Mast, APRN  January 23, 2024

## 2024-01-30 DIAGNOSIS — R92.8 ABNORMAL MAMMOGRAM: Primary | ICD-10-CM

## 2024-01-30 DIAGNOSIS — Z12.31 BREAST CANCER SCREENING BY MAMMOGRAM: ICD-10-CM

## 2024-02-29 ENCOUNTER — CLINICAL SUPPORT (OUTPATIENT)
Dept: FAMILY MEDICINE CLINIC | Facility: CLINIC | Age: 64
End: 2024-02-29
Payer: COMMERCIAL

## 2024-02-29 ENCOUNTER — HOSPITAL ENCOUNTER (OUTPATIENT)
Dept: MAMMOGRAPHY | Facility: HOSPITAL | Age: 64
Discharge: HOME | End: 2024-02-29
Admitting: NURSE PRACTITIONER
Payer: COMMERCIAL

## 2024-02-29 DIAGNOSIS — E53.8 B12 DEFICIENCY: Primary | ICD-10-CM

## 2024-02-29 DIAGNOSIS — R92.8 ABNORMAL MAMMOGRAM: ICD-10-CM

## 2024-02-29 PROCEDURE — G0279 TOMOSYNTHESIS, MAMMO: HCPCS

## 2024-02-29 PROCEDURE — 77062 BREAST TOMOSYNTHESIS BI: CPT | Performed by: RADIOLOGY

## 2024-02-29 PROCEDURE — 77066 DX MAMMO INCL CAD BI: CPT

## 2024-02-29 PROCEDURE — 96372 THER/PROPH/DIAG INJ SC/IM: CPT | Performed by: FAMILY MEDICINE

## 2024-02-29 PROCEDURE — 77066 DX MAMMO INCL CAD BI: CPT | Performed by: RADIOLOGY

## 2024-02-29 RX ORDER — CYANOCOBALAMIN 1000 UG/ML
1000 INJECTION, SOLUTION INTRAMUSCULAR; SUBCUTANEOUS ONCE
Status: COMPLETED | OUTPATIENT
Start: 2024-02-29 | End: 2024-02-29

## 2024-02-29 RX ADMIN — CYANOCOBALAMIN 1000 MCG: 1000 INJECTION, SOLUTION INTRAMUSCULAR; SUBCUTANEOUS at 10:32

## 2024-03-05 ENCOUNTER — HOSPITAL ENCOUNTER (OUTPATIENT)
Dept: BONE DENSITY | Facility: HOSPITAL | Age: 64
Discharge: HOME | End: 2024-03-05
Admitting: NURSE PRACTITIONER
Payer: COMMERCIAL

## 2024-03-05 DIAGNOSIS — Z13.820 SCREENING FOR OSTEOPOROSIS: ICD-10-CM

## 2024-03-05 PROCEDURE — 77080 DXA BONE DENSITY AXIAL: CPT

## 2024-03-18 ENCOUNTER — OFFICE VISIT (OUTPATIENT)
Dept: FAMILY MEDICINE CLINIC | Facility: CLINIC | Age: 64
End: 2024-03-18
Payer: COMMERCIAL

## 2024-03-18 VITALS
BODY MASS INDEX: 29.67 KG/M2 | DIASTOLIC BLOOD PRESSURE: 78 MMHG | SYSTOLIC BLOOD PRESSURE: 116 MMHG | HEIGHT: 66 IN | TEMPERATURE: 96.8 F | OXYGEN SATURATION: 98 % | HEART RATE: 88 BPM | WEIGHT: 184.6 LBS

## 2024-03-18 DIAGNOSIS — S39.012A STRAIN OF TENDON OF LOWER BACK: ICD-10-CM

## 2024-03-18 DIAGNOSIS — H69.93 EUSTACHIAN TUBE DYSFUNCTION, BILATERAL: ICD-10-CM

## 2024-03-18 DIAGNOSIS — E78.49 OTHER HYPERLIPIDEMIA: Primary | ICD-10-CM

## 2024-03-18 DIAGNOSIS — R79.89 ABNORMAL TSH: ICD-10-CM

## 2024-03-18 DIAGNOSIS — E55.9 VITAMIN D DEFICIENCY: ICD-10-CM

## 2024-03-18 PROCEDURE — 36415 COLL VENOUS BLD VENIPUNCTURE: CPT | Performed by: FAMILY MEDICINE

## 2024-03-18 PROCEDURE — 85025 COMPLETE CBC W/AUTO DIFF WBC: CPT | Performed by: FAMILY MEDICINE

## 2024-03-18 PROCEDURE — 83735 ASSAY OF MAGNESIUM: CPT | Performed by: FAMILY MEDICINE

## 2024-03-18 PROCEDURE — 84443 ASSAY THYROID STIM HORMONE: CPT | Performed by: FAMILY MEDICINE

## 2024-03-18 PROCEDURE — 80053 COMPREHEN METABOLIC PANEL: CPT | Performed by: FAMILY MEDICINE

## 2024-03-18 RX ORDER — ERGOCALCIFEROL 1.25 MG/1
50000 CAPSULE ORAL WEEKLY
Qty: 12 CAPSULE | Refills: 1 | Status: SHIPPED | OUTPATIENT
Start: 2024-03-18

## 2024-03-18 RX ORDER — METHOCARBAMOL 500 MG/1
500 TABLET, FILM COATED ORAL 2 TIMES DAILY PRN
Qty: 30 TABLET | Refills: 0 | Status: SHIPPED | OUTPATIENT
Start: 2024-03-18

## 2024-03-18 RX ORDER — PSEUDOEPHEDRINE HYDROCHLORIDE 60 MG/1
60 TABLET, FILM COATED ORAL 2 TIMES DAILY
Qty: 60 TABLET | Refills: 2 | Status: SHIPPED | OUTPATIENT
Start: 2024-03-18

## 2024-03-18 RX ORDER — ATORVASTATIN CALCIUM 10 MG/1
10 TABLET, FILM COATED ORAL DAILY
Qty: 90 TABLET | Refills: 1 | Status: SHIPPED | OUTPATIENT
Start: 2024-03-18

## 2024-03-18 NOTE — PROGRESS NOTES
The 10-year ASCVD risk score (Silverio CAMERON, et al., 2019) is: 5.5%    Values used to calculate the score:      Age: 63 years      Sex: Female      Is Non- : No      Diabetic: No      Tobacco smoker: No      Systolic Blood Pressure: 116 mmHg      Is BP treated: Yes      HDL Cholesterol: 33 mg/dL      Total Cholesterol: 152 mg/dL

## 2024-03-18 NOTE — PROGRESS NOTES
"Mercedes Dale     VITALS: Blood pressure 116/78, pulse 88, temperature 96.8 °F (36 °C), temperature source Temporal, height 167.6 cm (66\"), weight 83.7 kg (184 lb 9.6 oz), SpO2 98%, not currently breastfeeding.    Subjective  Chief Complaint  Heartburn (/)    Subjective          History of Present Illness:  The patient is a 63-year-old female woman with medical conditions significant for hyperlipidemia, atrial fibrillation, and insomnia who presents to clinic for medical follow-up.    She is having severe acid reflux. Last night, she walked down the hallway and suddenly felt a burning sensation in her chest. She has not taken Tums or anything over-the-counter. She had it years ago and had polyps removed during EGD. She is not feeling any stress. She feels hungry, but she is not hungry. It feels like she has an empty stomach feeling. She denies any stomach pain. She had a bad fall up the stairs and is wondering if her symptoms are intestinal. Her bowels are not moving good. She is not exercising. She used to walk twice a day on her tennis court, but she does not anymore. She has changed her diet, but it is junk food right now. She eats more sugar. She has flatulence. She has compacted stools and has a prolapse into her vaginal wall. She is not taking any fiber.    She has never had a bone density scan before. She is taking vitamin D. She stopped taking zinc. She is sleeping well. She wakes up with a stiff neck.    She received her B12 injection 2 weeks ago.    The following portions of the patient's history were reviewed and updated as appropriate: allergies, current medications, past family history, past medical history, past social history, past surgical history and problem list.    Past Medical History  Past Medical History:   Diagnosis Date    Abnormal heart rhythm     Anxiety 09/01/2016    Anxiety     Atrial fibrillation     Back pain     Constipation 09/01/2016    Decreased libido     Diverticulitis     Heart " "murmur     Heart palpitations     Heartburn     History of echocardiogram     History of Holter monitoring     History of stress test     Hot flashes 09/01/2016    Hyperlipidemia     Hypertension 09/01/2016    Insomnia 09/01/2016    Mitral valve prolapse     Other specified glaucoma     Reflux esophagitis     Sinusitis     GEORGE (stress urinary incontinence, female) 09/01/2016       Surgical History  Past Surgical History:   Procedure Laterality Date    GALLBLADDER SURGERY  2012    KNEE ARTHROSCOPY      scope twice    LAPAROSCOPIC ASSISTED VAGINAL HYSTERECTOMY SALPINGO OOPHORECTOMY  2011    benign       Family History  Family History   Problem Relation Age of Onset    Stroke Mother     Dementia Mother     Mitral valve prolapse Mother     Heart attack Mother     Cancer Mother     COPD Mother     Kidney disease Mother     Heart attack Father     Heart failure Father     Hypertension Sister     Thyroid disease Sister     Anxiety disorder Sister     Depression Sister     Diabetes Maternal Grandmother     Diabetes Paternal Grandmother     Breast cancer Cousin         paternal       Social History  Social History     Socioeconomic History    Marital status:     Number of children: 2    Highest education level: Some college, no degree   Tobacco Use    Smoking status: Never     Passive exposure: Never    Smokeless tobacco: Never   Vaping Use    Vaping status: Never Used   Substance and Sexual Activity    Alcohol use: No    Drug use: No    Sexual activity: Yes     Partners: Male     Birth control/protection: Surgical, Hysterectomy       Objective   Vital Signs:   /78 (BP Location: Right arm, Patient Position: Sitting, Cuff Size: Adult)   Pulse 88   Temp 96.8 °F (36 °C) (Temporal)   Ht 167.6 cm (66\")   Wt 83.7 kg (184 lb 9.6 oz)   SpO2 98%   BMI 29.80 kg/m²     Physical Exam     Gen: Patient in NAD. Pleasant and answers appropriately. A&Ox3.    Skin: Warm and dry with normal turgor. No purpura, rashes, or " unusual pigmentation noted. Hair is normal in appearance and distribution.    HEENT: NC/AT. No lesions noted. Conjunctiva clear, sclera nonicteric. PERRL. EOMI without nystagmus or strabismus. Fundi appear benign. No hemorrhages or exudates of eyes. Auditory canals are patent bilaterally without lesions. TMs intact,  nonerythematous, bulging without lesions. Nasal mucosa erythematous, and nonedematous. Frontal and maxillary sinuses are nontender. O/P erythematous and moist without exudate.    Neck: Supple without lymph nodes palpated.  Decreased ROM.     Lungs: CTA B/L without rales, rhonchi, crackles, or wheezes.    Heart: Irregularly irregular. S1 and S2 normal. No S3 or S4. No MRGT.    Abd: Soft, nontender,nondistended. (+)BSx4 quadrants.     Extrem: No CCE. Radial pulses 2+/4 and equal B/L. FROMx4.  Positive joint tenderness noted.    Back: Decreased range of motion.    Neuro: No focal motor/sensory deficits.      Procedures       Assessment and Plan    This is a 63-year-old female who presents to clinic for medical follow-up.    Diagnoses and all orders for this visit:    1. Other hyperlipidemia (Primary)  -     atorvastatin (LIPITOR) 10 MG tablet; Take 1 tablet by mouth Daily.  Dispense: 90 tablet; Refill: 1  -     CBC Auto Differential; Future  -     Comprehensive Metabolic Panel; Future  -     CBC Auto Differential  -     Comprehensive Metabolic Panel    2. Strain of tendon of lower back  -     methocarbamol (ROBAXIN) 500 MG tablet; Take 1 tablet by mouth 2 (Two) Times a Day As Needed for Muscle Spasms.  Dispense: 30 tablet; Refill: 0  -     CBC Auto Differential; Future  -     Comprehensive Metabolic Panel; Future  -     CBC Auto Differential  -     Comprehensive Metabolic Panel    3. Eustachian tube dysfunction, bilateral  -     pseudoephedrine (SUDAFED) 60 MG tablet; Take 1 tablet by mouth 2 (Two) Times a Day.  Dispense: 60 tablet; Refill: 2  -     CBC Auto Differential; Future  -     Comprehensive  Metabolic Panel; Future  -     CBC Auto Differential  -     Comprehensive Metabolic Panel    4. Vitamin D deficiency  -     vitamin D (ERGOCALCIFEROL) 1.25 MG (00136 UT) capsule capsule; Take 1 capsule by mouth 1 (One) Time Per Week.  Dispense: 12 capsule; Refill: 1  -     CBC Auto Differential; Future  -     Comprehensive Metabolic Panel; Future  -     CBC Auto Differential  -     Comprehensive Metabolic Panel    5. Abnormal TSH  -     CBC Auto Differential; Future  -     Comprehensive Metabolic Panel; Future  -     Magnesium; Future  -     TSH Rfx On Abnormal To Free T4; Future  -     CBC Auto Differential  -     Comprehensive Metabolic Panel  -     Magnesium  -     TSH Rfx On Abnormal To Free T4        1. Acid reflux.  She has gained 8 pounds. She was advised to take Tums. If it does not help, she can take Pepcid. If the Pepcid does not help, she will call me and I will prescribe Prevacid, Prilosec, or Nexium.    2. Weight gain.  Her cholesterol dropped 90 points. She was advised to add fiber to her diet.    3. Osteopenia.  I will increase her vitamin D to 50,000 IU once a week for 12 weeks. She was advised to do weightbearing exercise.    Problem List Items Addressed This Visit          Cardiac and Vasculature    Hyperlipidemia, unspecified    Relevant Medications    atorvastatin (LIPITOR) 10 MG tablet    Other Relevant Orders    CBC Auto Differential    Comprehensive Metabolic Panel     Other Visit Diagnoses       Vitamin D deficiency    -  Primary    Relevant Medications    vitamin D (ERGOCALCIFEROL) 1.25 MG (19096 UT) capsule capsule    Other Relevant Orders    CBC Auto Differential    Comprehensive Metabolic Panel    Strain of tendon of lower back        Relevant Medications    methocarbamol (ROBAXIN) 500 MG tablet    Other Relevant Orders    CBC Auto Differential    Comprehensive Metabolic Panel    Eustachian tube dysfunction, bilateral        Relevant Medications    pseudoephedrine (SUDAFED) 60 MG tablet     Other Relevant Orders    CBC Auto Differential    Comprehensive Metabolic Panel    Abnormal TSH        Relevant Orders    CBC Auto Differential    Comprehensive Metabolic Panel    Magnesium    TSH Rfx On Abnormal To Free T4                 Follow Up   Return in about 3 months (around 6/18/2024), or LABS.  Findings and plans discussed with patient who verbalizes understanding and agreement. Will followup with patient once results are in. Patient was given instructions and counseling regarding her condition or for health maintenance advice. Please see specific information pulled into the AVS if appropriate.     Transcribed from ambient dictation for Jessica Graham MD by Debra Aguirre.  03/18/24   12:29 EDT    Patient or patient representative verbalized consent to the visit recording.  I have personally performed the services described in this document as transcribed by the above individual, and it is both accurate and complete.

## 2024-03-19 LAB
ALBUMIN SERPL-MCNC: 4.3 G/DL (ref 3.5–5.2)
ALBUMIN/GLOB SERPL: 1.2 G/DL
ALP SERPL-CCNC: 65 U/L (ref 39–117)
ALT SERPL W P-5'-P-CCNC: 47 U/L (ref 1–33)
ANION GAP SERPL CALCULATED.3IONS-SCNC: 12.7 MMOL/L (ref 5–15)
AST SERPL-CCNC: 30 U/L (ref 1–32)
BASOPHILS # BLD AUTO: 0.03 10*3/MM3 (ref 0–0.2)
BASOPHILS NFR BLD AUTO: 0.4 % (ref 0–1.5)
BILIRUB SERPL-MCNC: 0.6 MG/DL (ref 0–1.2)
BUN SERPL-MCNC: 15 MG/DL (ref 8–23)
BUN/CREAT SERPL: 15 (ref 7–25)
CALCIUM SPEC-SCNC: 9.4 MG/DL (ref 8.6–10.5)
CHLORIDE SERPL-SCNC: 103 MMOL/L (ref 98–107)
CO2 SERPL-SCNC: 24.3 MMOL/L (ref 22–29)
CREAT SERPL-MCNC: 1 MG/DL (ref 0.57–1)
DEPRECATED RDW RBC AUTO: 43.3 FL (ref 37–54)
EGFRCR SERPLBLD CKD-EPI 2021: 63.4 ML/MIN/1.73
EOSINOPHIL # BLD AUTO: 0.18 10*3/MM3 (ref 0–0.4)
EOSINOPHIL NFR BLD AUTO: 2.2 % (ref 0.3–6.2)
ERYTHROCYTE [DISTWIDTH] IN BLOOD BY AUTOMATED COUNT: 12.5 % (ref 12.3–15.4)
GLOBULIN UR ELPH-MCNC: 3.6 GM/DL
GLUCOSE SERPL-MCNC: 120 MG/DL (ref 65–99)
HCT VFR BLD AUTO: 44.5 % (ref 34–46.6)
HGB BLD-MCNC: 15.3 G/DL (ref 12–15.9)
IMM GRANULOCYTES # BLD AUTO: 0.02 10*3/MM3 (ref 0–0.05)
IMM GRANULOCYTES NFR BLD AUTO: 0.2 % (ref 0–0.5)
LYMPHOCYTES # BLD AUTO: 2.64 10*3/MM3 (ref 0.7–3.1)
LYMPHOCYTES NFR BLD AUTO: 31.7 % (ref 19.6–45.3)
MAGNESIUM SERPL-MCNC: 2.4 MG/DL (ref 1.6–2.4)
MCH RBC QN AUTO: 32.3 PG (ref 26.6–33)
MCHC RBC AUTO-ENTMCNC: 34.4 G/DL (ref 31.5–35.7)
MCV RBC AUTO: 93.9 FL (ref 79–97)
MONOCYTES # BLD AUTO: 0.6 10*3/MM3 (ref 0.1–0.9)
MONOCYTES NFR BLD AUTO: 7.2 % (ref 5–12)
NEUTROPHILS NFR BLD AUTO: 4.86 10*3/MM3 (ref 1.7–7)
NEUTROPHILS NFR BLD AUTO: 58.3 % (ref 42.7–76)
NRBC BLD AUTO-RTO: 0 /100 WBC (ref 0–0.2)
PLATELET # BLD AUTO: 259 10*3/MM3 (ref 140–450)
PMV BLD AUTO: 12.7 FL (ref 6–12)
POTASSIUM SERPL-SCNC: 4.6 MMOL/L (ref 3.5–5.2)
PROT SERPL-MCNC: 7.9 G/DL (ref 6–8.5)
RBC # BLD AUTO: 4.74 10*6/MM3 (ref 3.77–5.28)
SODIUM SERPL-SCNC: 140 MMOL/L (ref 136–145)
TSH SERPL DL<=0.05 MIU/L-ACNC: 0.38 UIU/ML (ref 0.27–4.2)
WBC NRBC COR # BLD AUTO: 8.33 10*3/MM3 (ref 3.4–10.8)

## 2024-04-22 RX ORDER — FLECAINIDE ACETATE 50 MG/1
50 TABLET ORAL 2 TIMES DAILY
Qty: 180 TABLET | Refills: 5 | Status: SHIPPED | OUTPATIENT
Start: 2024-04-22

## 2024-05-08 ENCOUNTER — CLINICAL SUPPORT (OUTPATIENT)
Dept: FAMILY MEDICINE CLINIC | Facility: CLINIC | Age: 64
End: 2024-05-08
Payer: COMMERCIAL

## 2024-05-08 ENCOUNTER — OFFICE VISIT (OUTPATIENT)
Dept: PULMONOLOGY | Facility: CLINIC | Age: 64
End: 2024-05-08
Payer: COMMERCIAL

## 2024-05-08 VITALS
HEIGHT: 66 IN | BODY MASS INDEX: 29.57 KG/M2 | TEMPERATURE: 98.1 F | DIASTOLIC BLOOD PRESSURE: 68 MMHG | WEIGHT: 184 LBS | HEART RATE: 55 BPM | OXYGEN SATURATION: 95 % | SYSTOLIC BLOOD PRESSURE: 122 MMHG

## 2024-05-08 DIAGNOSIS — G47.09 OTHER INSOMNIA: Primary | ICD-10-CM

## 2024-05-08 DIAGNOSIS — E53.8 B12 DEFICIENCY: Primary | ICD-10-CM

## 2024-05-08 DIAGNOSIS — E66.3 OVERWEIGHT: ICD-10-CM

## 2024-05-08 PROCEDURE — 96372 THER/PROPH/DIAG INJ SC/IM: CPT | Performed by: FAMILY MEDICINE

## 2024-05-08 RX ORDER — ZOLPIDEM TARTRATE 10 MG/1
10 TABLET ORAL NIGHTLY PRN
Qty: 30 TABLET | Refills: 5 | Status: SHIPPED | OUTPATIENT
Start: 2024-05-08

## 2024-05-08 RX ORDER — CYANOCOBALAMIN 1000 UG/ML
1000 INJECTION, SOLUTION INTRAMUSCULAR; SUBCUTANEOUS ONCE
Status: COMPLETED | OUTPATIENT
Start: 2024-05-08 | End: 2024-05-08

## 2024-05-08 RX ADMIN — CYANOCOBALAMIN 1000 MCG: 1000 INJECTION, SOLUTION INTRAMUSCULAR; SUBCUTANEOUS at 09:54

## 2024-06-07 ENCOUNTER — CLINICAL SUPPORT (OUTPATIENT)
Dept: FAMILY MEDICINE CLINIC | Facility: CLINIC | Age: 64
End: 2024-06-07
Payer: COMMERCIAL

## 2024-06-07 DIAGNOSIS — E53.8 B12 DEFICIENCY: Primary | ICD-10-CM

## 2024-06-07 PROCEDURE — 96372 THER/PROPH/DIAG INJ SC/IM: CPT | Performed by: FAMILY MEDICINE

## 2024-06-07 RX ORDER — CYANOCOBALAMIN 1000 UG/ML
1000 INJECTION, SOLUTION INTRAMUSCULAR; SUBCUTANEOUS ONCE
Status: COMPLETED | OUTPATIENT
Start: 2024-06-07 | End: 2024-06-07

## 2024-06-07 RX ADMIN — CYANOCOBALAMIN 1000 MCG: 1000 INJECTION, SOLUTION INTRAMUSCULAR; SUBCUTANEOUS at 11:44

## 2024-06-18 ENCOUNTER — LAB (OUTPATIENT)
Dept: FAMILY MEDICINE CLINIC | Facility: CLINIC | Age: 64
End: 2024-06-18
Payer: COMMERCIAL

## 2024-06-18 ENCOUNTER — OFFICE VISIT (OUTPATIENT)
Dept: FAMILY MEDICINE CLINIC | Facility: CLINIC | Age: 64
End: 2024-06-18
Payer: COMMERCIAL

## 2024-06-18 VITALS
DIASTOLIC BLOOD PRESSURE: 68 MMHG | WEIGHT: 185.6 LBS | SYSTOLIC BLOOD PRESSURE: 130 MMHG | TEMPERATURE: 97.6 F | HEIGHT: 66 IN | HEART RATE: 60 BPM | BODY MASS INDEX: 29.83 KG/M2 | OXYGEN SATURATION: 99 %

## 2024-06-18 DIAGNOSIS — R79.89 ABNORMAL TSH: ICD-10-CM

## 2024-06-18 DIAGNOSIS — E53.8 B12 DEFICIENCY: ICD-10-CM

## 2024-06-18 DIAGNOSIS — E78.49 OTHER HYPERLIPIDEMIA: ICD-10-CM

## 2024-06-18 DIAGNOSIS — E78.49 OTHER HYPERLIPIDEMIA: Primary | ICD-10-CM

## 2024-06-18 DIAGNOSIS — R10.9 RIGHT LATERAL ABDOMINAL PAIN: ICD-10-CM

## 2024-06-18 DIAGNOSIS — R14.0 BLOATING: ICD-10-CM

## 2024-06-18 PROCEDURE — 80053 COMPREHEN METABOLIC PANEL: CPT | Performed by: FAMILY MEDICINE

## 2024-06-18 PROCEDURE — 36415 COLL VENOUS BLD VENIPUNCTURE: CPT

## 2024-06-18 PROCEDURE — 99214 OFFICE O/P EST MOD 30 MIN: CPT | Performed by: FAMILY MEDICINE

## 2024-06-18 PROCEDURE — 84443 ASSAY THYROID STIM HORMONE: CPT | Performed by: FAMILY MEDICINE

## 2024-06-18 RX ORDER — SIMETHICONE 80 MG
80 TABLET,CHEWABLE ORAL EVERY 6 HOURS PRN
Qty: 120 TABLET | Refills: 0 | Status: SHIPPED | OUTPATIENT
Start: 2024-06-18

## 2024-06-19 LAB
ALBUMIN SERPL-MCNC: 4.4 G/DL (ref 3.5–5.2)
ALBUMIN/GLOB SERPL: 1.3 G/DL
ALP SERPL-CCNC: 53 U/L (ref 39–117)
ALT SERPL W P-5'-P-CCNC: 38 U/L (ref 1–33)
ANION GAP SERPL CALCULATED.3IONS-SCNC: 10 MMOL/L (ref 5–15)
AST SERPL-CCNC: 29 U/L (ref 1–32)
BILIRUB SERPL-MCNC: 0.6 MG/DL (ref 0–1.2)
BUN SERPL-MCNC: 14 MG/DL (ref 8–23)
BUN/CREAT SERPL: 14.9 (ref 7–25)
CALCIUM SPEC-SCNC: 8.8 MG/DL (ref 8.6–10.5)
CHLORIDE SERPL-SCNC: 105 MMOL/L (ref 98–107)
CO2 SERPL-SCNC: 25 MMOL/L (ref 22–29)
CREAT SERPL-MCNC: 0.94 MG/DL (ref 0.57–1)
EGFRCR SERPLBLD CKD-EPI 2021: 68.3 ML/MIN/1.73
GLOBULIN UR ELPH-MCNC: 3.3 GM/DL
GLUCOSE SERPL-MCNC: 103 MG/DL (ref 65–99)
POTASSIUM SERPL-SCNC: 4.3 MMOL/L (ref 3.5–5.2)
PROT SERPL-MCNC: 7.7 G/DL (ref 6–8.5)
SODIUM SERPL-SCNC: 140 MMOL/L (ref 136–145)
TSH SERPL DL<=0.05 MIU/L-ACNC: 1.8 UIU/ML (ref 0.27–4.2)

## 2024-07-02 ENCOUNTER — CLINICAL SUPPORT (OUTPATIENT)
Dept: FAMILY MEDICINE CLINIC | Facility: CLINIC | Age: 64
End: 2024-07-02
Payer: COMMERCIAL

## 2024-07-02 DIAGNOSIS — E53.8 B12 DEFICIENCY: Primary | ICD-10-CM

## 2024-07-02 PROCEDURE — 96372 THER/PROPH/DIAG INJ SC/IM: CPT | Performed by: FAMILY MEDICINE

## 2024-07-02 RX ORDER — CYANOCOBALAMIN 1000 UG/ML
1000 INJECTION, SOLUTION INTRAMUSCULAR; SUBCUTANEOUS ONCE
Status: COMPLETED | OUTPATIENT
Start: 2024-07-02 | End: 2024-07-02

## 2024-07-02 RX ADMIN — CYANOCOBALAMIN 1000 MCG: 1000 INJECTION, SOLUTION INTRAMUSCULAR; SUBCUTANEOUS at 11:58

## 2024-07-03 NOTE — PROGRESS NOTES
"Mercedes Dale     VITALS: Blood pressure 130/68, pulse 60, temperature 97.6 °F (36.4 °C), temperature source Temporal, height 167.6 cm (65.98\"), weight 84.2 kg (185 lb 9.6 oz), SpO2 99%, not currently breastfeeding.    Subjective  Chief Complaint  Constipation and Gas    Subjective          History of Present Illness:  Patient is a 63 y.o.  female with medical conditions significant for hyperlipidemia, atrial fibrillation, and insomnia who presents to clinic secondary to medical followup.  Patient is complaining of increased bloating, gas, and constipation.  She has not started any new diet or exercise.  She has not started any new medications.  She states that she does have a history of a rectovaginal prolapse.  Patient complains that every time she moves, she does release some gas.  She is afraid that when she moves, she will start releasing stool.  Patient is currently taking B12 shots.  She feels that this has really helped her fatigue.    Patient had been having some GERD.  That has been better.    No complaints about any of the medications.    The following portions of the patient's history were reviewed and updated as appropriate: allergies, current medications, past family history, past medical history, past social history, past surgical history and problem list.    Past Medical History  Past Medical History:   Diagnosis Date    Abnormal heart rhythm     Anxiety 09/01/2016    Anxiety     Atrial fibrillation     Back pain     Constipation 09/01/2016    Decreased libido     Diverticulitis     Heart murmur     Heart palpitations     Heartburn     History of echocardiogram     History of Holter monitoring     History of stress test     Hot flashes 09/01/2016    Hyperlipidemia     Hypertension 09/01/2016    Insomnia 09/01/2016    Mitral valve prolapse     Other specified glaucoma     Reflux esophagitis     Sinusitis     GEORGE (stress urinary incontinence, female) 09/01/2016       Surgical History  Past Surgical " "History:   Procedure Laterality Date    GALLBLADDER SURGERY  2012    KNEE ARTHROSCOPY      scope twice    LAPAROSCOPIC ASSISTED VAGINAL HYSTERECTOMY SALPINGO OOPHORECTOMY  2011    benign       Family History  Family History   Problem Relation Age of Onset    Stroke Mother     Dementia Mother     Mitral valve prolapse Mother     Heart attack Mother     Cancer Mother     COPD Mother     Kidney disease Mother     Heart attack Father     Heart failure Father     Hypertension Sister     Thyroid disease Sister     Anxiety disorder Sister     Depression Sister     Diabetes Maternal Grandmother     Diabetes Paternal Grandmother     Breast cancer Cousin         paternal       Social History  Social History     Socioeconomic History    Marital status:     Number of children: 2    Highest education level: Some college, no degree   Tobacco Use    Smoking status: Never     Passive exposure: Never    Smokeless tobacco: Never   Vaping Use    Vaping status: Never Used   Substance and Sexual Activity    Alcohol use: No    Drug use: No    Sexual activity: Yes     Partners: Male     Birth control/protection: Surgical, Hysterectomy       Objective   Vital Signs:   /68 (BP Location: Right arm, Patient Position: Sitting)   Pulse 60   Temp 97.6 °F (36.4 °C) (Temporal)   Ht 167.6 cm (65.98\")   Wt 84.2 kg (185 lb 9.6 oz)   SpO2 99%   BMI 29.97 kg/m²     Physical Exam     Gen: Patient in NAD. Pleasant and answers appropriately. A&Ox3.    Skin: Warm and dry with normal turgor. No purpura, rashes, or unusual pigmentation noted. Hair is normal in appearance and distribution.    HEENT: NC/AT. No lesions noted. Conjunctiva clear, sclera nonicteric. PERRL. EOMI without nystagmus or strabismus. Fundi appear benign. No hemorrhages or exudates of eyes. Auditory canals are patent bilaterally without lesions. TMs intact,  nonerythematous, bulging without lesions. Nasal mucosa pink, nonerythematous, and nonedematous. Frontal and " maxillary sinuses are nontender. O/P erythematous and moist without exudate.    Neck: Supple without lymph nodes palpated. FROM.     Lungs: Slightly decreased B/L without rales, rhonchi, crackles, or wheezes.    Heart: Irregularly irregular. S1 and S2 normal. No S3 or S4. No MRGT.    Abd: Soft, some tenderness along the right lateral side,nondistended. (+)BSx4 quadrants.  No HSM or masses.    Extrem: No CCE. Radial pulses 2+/4 and equal B/L. FROMx4. No bone, joint, or muscle tenderness noted.    Neuro: No focal motor/sensory deficits.    Procedures    Result Review :   The following data was reviewed by: Jessica Graham MD on 06/18/2024:                Assessment and Plan    Mercedes Dale is a 63 y.o. here for medical followup.    Diagnoses and all orders for this visit:    1. Other hyperlipidemia (Primary)  -     Comprehensive Metabolic Panel; Future    2. B12 deficiency  -     Comprehensive Metabolic Panel; Future    3. Abnormal TSH  -     Comprehensive Metabolic Panel; Future  -     TSH Rfx On Abnormal To Free T4; Future    4. Bloating  -     simethicone (MYLICON) 80 MG chewable tablet; Chew 1 tablet Every 6 (Six) Hours As Needed for Flatulence.  Dispense: 120 tablet; Refill: 0    5. Right lateral abdominal pain  -     XR Abdomen KUB          Problem List Items Addressed This Visit          Cardiac and Vasculature    Hyperlipidemia, unspecified - Primary    Relevant Orders    Comprehensive Metabolic Panel (Completed)     Other Visit Diagnoses       B12 deficiency        Relevant Orders    Comprehensive Metabolic Panel (Completed)    Abnormal TSH        Relevant Orders    Comprehensive Metabolic Panel (Completed)    TSH Rfx On Abnormal To Free T4 (Completed)    Bloating        Relevant Medications    simethicone (MYLICON) 80 MG chewable tablet    Right lateral abdominal pain        Relevant Orders    XR Abdomen KUB (Completed)                  Follow Up   Return in about 3 months (around 9/18/2024), or LABS,  xray.  Findings and plans discussed with patient who verbalizes understanding and agreement. Will followup with patient once results are in. Patient was given instructions and counseling regarding her condition or for health maintenance advice. Please see specific information pulled into the AVS if appropriate.       Jessica Graham MD

## 2024-07-08 ENCOUNTER — TELEPHONE (OUTPATIENT)
Dept: CARDIOLOGY | Facility: CLINIC | Age: 64
End: 2024-07-08
Payer: COMMERCIAL

## 2024-07-08 DIAGNOSIS — R00.1 BRADYCARDIA, SINUS: Primary | ICD-10-CM

## 2024-07-08 NOTE — TELEPHONE ENCOUNTER
Caller: Dale Mercedes ALDA     Relationship: [unfilled]     Best call back number: [phone]    What is your medical concern? PATIENT CONCERNED WITH LOW HEART RATE. STATES HER APPLE WATCH HAS ALERTED HER IN THE MIDDLE OF THE NIGHT, REPORTING THAT HER HEART RATE DROPPED BELOW 40. SAYS THIS HAS HAPPENED THE PAST 3 NIGHTS IN A ROW AND USUALLY LAST AROUND 5-10 MINUTES. RESTING HEART RATE CURRENTLY AT 53 AND USUALLY FLUCTUATES BETWEEN 48-58.     Is your provider already aware of this issue? NO    Have you been treated for this issue? CURRENTLY TAKING DILTIAZEM 120 MG FOR HER BLOOD PRESSURE.

## 2024-07-08 NOTE — TELEPHONE ENCOUNTER
Spoke with pt over the past 3 nights her apple watch alarmed for low heart rate 38, 40,40 . Resting heart rate 48-58 typically for pt , today pulse 51. Pt asymptomatic,only aware do to the watch notification .  Patient noted the day prior to heart rate of 38 in the evening , pt entertained guests for the 4th and had over 17k steps logged.

## 2024-07-09 NOTE — TELEPHONE ENCOUNTER
AVSS overnight, denying pain. Scheduled Toradol and Tylenol given with PRN Zofran. No emesis tonight. Abdomen slightly rounded and slightly firm, tender to touch. Dried drainage on lap sites. Father at bedside, supportive of patient.   Followed up with pt , will look for sleep apnea features on her watch . She notices it vibrates in the morning to alarm her of her heart rate while she was sleeping . Pt has not taken diltiazem last 3 nights , last night watch did not alarm . Pt asking if  she should continue taking diltiazem or change dosage ?? Pt is agreeable to 48 hr holter, to be mailed to home. Order placed . Pt has not recorded blood pressure, but states anytime she's had it checked at physician office its normal .

## 2024-07-09 NOTE — TELEPHONE ENCOUNTER
Does it alarm her while sleeping?  Any features suggesting sleep apnea?  Known sinus bradycardia, HR 50s on last EKG - on flecainide and diltiazem.   Unsure if diltiazem was used for Afib or HTN.   BP stable?   48 hour Holter to check for heart rate variability

## 2024-07-31 ENCOUNTER — TELEPHONE (OUTPATIENT)
Dept: CARDIOLOGY | Facility: CLINIC | Age: 64
End: 2024-07-31

## 2024-07-31 DIAGNOSIS — R00.1 BRADYCARDIA, SINUS: Primary | ICD-10-CM

## 2024-07-31 DIAGNOSIS — R00.2 PALPITATIONS: ICD-10-CM

## 2024-07-31 NOTE — TELEPHONE ENCOUNTER
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  Referral placed . No further questions/concerns

## 2024-08-05 ENCOUNTER — TELEPHONE (OUTPATIENT)
Dept: FAMILY MEDICINE CLINIC | Facility: CLINIC | Age: 64
End: 2024-08-05
Payer: COMMERCIAL

## 2024-08-05 NOTE — TELEPHONE ENCOUNTER
Caller: Mercedes Dale    Relationship to patient: Self    Best call back number: [phone]     Date of positive COVID19 test: 08/05/24- HOME COVID TEST- [facility]    Date of possible COVID19 exposure: NOT SURE- WENT A [place] 07/27/24    COVID19 symptoms: BODY ACHES, SEVERE RAW SORE THROAT, HEAD CONGESTION, RUNNY NOSE, HEADACHE, SNEEZING, YELLOW DISCHARGE COUGH    Date of initial quarantine: - DID QUARANTINE LAST WEEK- WENT TO [facility] TODAY TO GET MEDICATION AND COVID TEST    Additional information or concerns: ASKING IF PATIENT SHOULD DO THE SAME REGIMEN AS PRESCRIBED LAST TIME SHE WAS COVID POSITIVE.    What is the patients preferred pharmacy: [facility] - [place] [address].  [phone] [facility] [phone] FX     PLEASE CALL TO ADVISE

## 2024-08-05 NOTE — TELEPHONE ENCOUNTER
Spoke with Laure she reports she has had this for a week but tests were Negative until this weekend,her sore throat is much improved was the worst she had ever had,body aches are less,still has a headache & sinus drainage,O2 sat on her Apple watch is 99%,she is sneezing but that is about all with her,her  just came in with body aches & Headache he is testing Negative just now but she is sure he has it to.She reports the last time they did a vitamin regimen & just symptom relief & did well,not interested either of them in Paxlovid.

## 2024-08-05 NOTE — TELEPHONE ENCOUNTER
Yes, I checked her chart too. Mucinex and sudafed. As long as their vitals are okay, those are okay. If the vitals start changing, call so they can get some more medications.

## 2024-09-08 DIAGNOSIS — E55.9 VITAMIN D DEFICIENCY: ICD-10-CM

## 2024-09-09 RX ORDER — ERGOCALCIFEROL 1.25 MG/1
CAPSULE, LIQUID FILLED ORAL
Qty: 12 CAPSULE | Refills: 0 | Status: SHIPPED | OUTPATIENT
Start: 2024-09-09

## 2024-09-24 ENCOUNTER — OFFICE VISIT (OUTPATIENT)
Dept: FAMILY MEDICINE CLINIC | Facility: CLINIC | Age: 64
End: 2024-09-24
Payer: COMMERCIAL

## 2024-09-24 VITALS
BODY MASS INDEX: 29.06 KG/M2 | HEART RATE: 64 BPM | WEIGHT: 180.8 LBS | TEMPERATURE: 97.1 F | HEIGHT: 66 IN | OXYGEN SATURATION: 97 % | SYSTOLIC BLOOD PRESSURE: 136 MMHG | RESPIRATION RATE: 16 BRPM | DIASTOLIC BLOOD PRESSURE: 68 MMHG

## 2024-09-24 DIAGNOSIS — S39.012A STRAIN OF TENDON OF LOWER BACK: ICD-10-CM

## 2024-09-24 DIAGNOSIS — N81.10 FEMALE BLADDER PROLAPSE, ACQUIRED: Primary | ICD-10-CM

## 2024-09-24 DIAGNOSIS — E78.49 OTHER HYPERLIPIDEMIA: ICD-10-CM

## 2024-09-24 DIAGNOSIS — E53.8 B12 DEFICIENCY: ICD-10-CM

## 2024-09-24 DIAGNOSIS — K62.3 RECTAL PROLAPSE: ICD-10-CM

## 2024-09-24 PROCEDURE — 96372 THER/PROPH/DIAG INJ SC/IM: CPT | Performed by: FAMILY MEDICINE

## 2024-09-24 PROCEDURE — 99214 OFFICE O/P EST MOD 30 MIN: CPT | Performed by: FAMILY MEDICINE

## 2024-09-24 RX ORDER — METHOCARBAMOL 500 MG/1
500 TABLET, FILM COATED ORAL 2 TIMES DAILY PRN
Qty: 30 TABLET | Refills: 0 | Status: SHIPPED | OUTPATIENT
Start: 2024-09-24

## 2024-09-24 RX ORDER — CYANOCOBALAMIN 1000 UG/ML
1000 INJECTION, SOLUTION INTRAMUSCULAR; SUBCUTANEOUS ONCE
Status: COMPLETED | OUTPATIENT
Start: 2024-09-24 | End: 2024-09-24

## 2024-09-24 RX ORDER — ATORVASTATIN CALCIUM 10 MG/1
10 TABLET, FILM COATED ORAL DAILY
Qty: 90 TABLET | Refills: 1 | Status: SHIPPED | OUTPATIENT
Start: 2024-09-24

## 2024-09-24 RX ADMIN — CYANOCOBALAMIN 1000 MCG: 1000 INJECTION, SOLUTION INTRAMUSCULAR; SUBCUTANEOUS at 11:11

## 2024-10-03 DIAGNOSIS — N95.9 POSTMENOPAUSAL SYMPTOMS: ICD-10-CM

## 2024-10-03 RX ORDER — CAL/D3/MAG11/ZINC/COP/MANG/BOR 600 MG-800
1 TABLET ORAL DAILY
Qty: 90 TABLET | Refills: 3 | Status: SHIPPED | OUTPATIENT
Start: 2024-10-03

## 2024-10-09 NOTE — PROGRESS NOTES
"Mercedes Dale     VITALS: Blood pressure 136/68, pulse 64, temperature 97.1 °F (36.2 °C), temperature source Temporal, resp. rate 16, height 167.6 cm (66\"), weight 82 kg (180 lb 12.8 oz), SpO2 97%, not currently breastfeeding.    Subjective  Chief Complaint  Thyroid Problem, Hyperlipidemia, and B12 Deficiency    Subjective          History of Present Illness:  Patient is a 63 y.o.  female with medical conditions significant for hyperlipidemia, atrial fibrillation, and insomnia who presents to clinic secondary to medical followup.     Patient is ready for a urogynecology consult regarding her bladder prolapse and rectal prolapse.  She is wanting to see Dr. Jade.    She has been getting her B12 shots.    No complaints about any of the medications.    The following portions of the patient's history were reviewed and updated as appropriate: allergies, current medications, past family history, past medical history, past social history, past surgical history and problem list.    Past Medical History  Past Medical History:   Diagnosis Date    Abnormal heart rhythm     Anxiety 09/01/2016    Anxiety     Atrial fibrillation     Back pain     Constipation 09/01/2016    Decreased libido     Diverticulitis     Heart murmur     Heart palpitations     Heartburn     History of echocardiogram     History of Holter monitoring     History of stress test     Hot flashes 09/01/2016    Hyperlipidemia     Hypertension 09/01/2016    Insomnia 09/01/2016    Mitral valve prolapse     Other specified glaucoma     Reflux esophagitis     Sinusitis     GEORGE (stress urinary incontinence, female) 09/01/2016       Surgical History  Past Surgical History:   Procedure Laterality Date    GALLBLADDER SURGERY  2012    KNEE ARTHROSCOPY      scope twice    LAPAROSCOPIC ASSISTED VAGINAL HYSTERECTOMY SALPINGO OOPHORECTOMY  2011    benign       Family History  Family History   Problem Relation Age of Onset    Stroke Mother     Dementia Mother     Mitral " "valve prolapse Mother     Heart attack Mother     Cancer Mother     COPD Mother     Kidney disease Mother     Heart attack Father     Heart failure Father     Hypertension Sister     Thyroid disease Sister     Anxiety disorder Sister     Depression Sister     Diabetes Maternal Grandmother     Diabetes Paternal Grandmother     Breast cancer Cousin         paternal       Social History  Social History     Socioeconomic History    Marital status:     Number of children: 2    Highest education level: Some college, no degree   Tobacco Use    Smoking status: Never     Passive exposure: Never    Smokeless tobacco: Never   Vaping Use    Vaping status: Never Used   Substance and Sexual Activity    Alcohol use: No    Drug use: No    Sexual activity: Yes     Partners: Male     Birth control/protection: Surgical, Hysterectomy       Objective   Vital Signs:   /68 (BP Location: Right arm, Patient Position: Sitting, Cuff Size: Adult)   Pulse 64   Temp 97.1 °F (36.2 °C) (Temporal)   Resp 16   Ht 167.6 cm (66\")   Wt 82 kg (180 lb 12.8 oz)   SpO2 97%   BMI 29.18 kg/m²     Physical Exam     Gen: Patient in NAD. Pleasant and answers appropriately. A&Ox3.    Skin: Warm and dry with normal turgor. No purpura, rashes, or unusual pigmentation noted. Hair is normal in appearance and distribution.    HEENT: NC/AT. No lesions noted. Conjunctiva clear, sclera nonicteric. PERRL. EOMI without nystagmus or strabismus. Fundi appear benign. No hemorrhages or exudates of eyes. Auditory canals are patent bilaterally without lesions. TMs intact,  nonerythematous, bulging without lesions. Nasal mucosa pink, nonerythematous, and nonedematous. Frontal and maxillary sinuses are nontender. O/P nonerythematous and moist without exudate.    Neck: Supple without lymph nodes palpated. FROM.     Lungs: Decreased B/L without rales, rhonchi, crackles, or wheezes.    Heart: Irregularly irregular. S1 and S2 normal. No S3 or S4. No MRGT.    Abd: " Soft, nontender,nondistended. (+)BSx4 quadrants.     Extrem: No CCE. Radial pulses 2+/4 and equal B/L. FROMx4. No bone, joint, or muscle tenderness noted.    Neuro: No focal motor/sensory deficits.    Procedures    Result Review :   The following data was reviewed by: Jessica Graham MD on 09/24/2024:                Assessment and Plan    Mercedes Dale is a 63 y.o. here for medical followup.    Diagnoses and all orders for this visit:    1. Female bladder prolapse, acquired (Primary)  -     Ambulatory Referral to Gynecology    2. Other hyperlipidemia  -     atorvastatin (LIPITOR) 10 MG tablet; Take 1 tablet by mouth Daily.  Dispense: 90 tablet; Refill: 1    3. Strain of tendon of lower back  -     methocarbamol (ROBAXIN) 500 MG tablet; Take 1 tablet by mouth 2 (Two) Times a Day As Needed for Muscle Spasms.  Dispense: 30 tablet; Refill: 0    4. Rectal prolapse  -     Ambulatory Referral to Gynecology    5. B12 deficiency  -     cyanocobalamin injection 1,000 mcg        Problem List Items Addressed This Visit          Cardiac and Vasculature    Hyperlipidemia, unspecified    Relevant Medications    atorvastatin (LIPITOR) 10 MG tablet     Other Visit Diagnoses       Female bladder prolapse, acquired    -  Primary    Relevant Orders    Ambulatory Referral to Gynecology (Completed)    Strain of tendon of lower back        Relevant Medications    methocarbamol (ROBAXIN) 500 MG tablet    Rectal prolapse        Relevant Orders    Ambulatory Referral to Gynecology (Completed)    B12 deficiency        Relevant Medications    cyanocobalamin injection 1,000 mcg (Completed)                Follow Up   Return in about 3 months (around 12/24/2024).  Findings and plans discussed with patient who verbalizes understanding and agreement. Will followup with patient once results are in. Patient was given instructions and counseling regarding her condition or for health maintenance advice. Please see specific information pulled into  the AVS if appropriate.       Jessica Graham MD

## 2024-10-10 DIAGNOSIS — E55.9 VITAMIN D DEFICIENCY: ICD-10-CM

## 2024-10-11 RX ORDER — ERGOCALCIFEROL 1.25 MG/1
CAPSULE, LIQUID FILLED ORAL
Refills: 0 | OUTPATIENT
Start: 2024-10-11

## 2024-10-14 RX ORDER — APIXABAN 5 MG/1
5 TABLET, FILM COATED ORAL EVERY 12 HOURS
Qty: 60 TABLET | Refills: 0 | Status: SHIPPED | OUTPATIENT
Start: 2024-10-14

## 2024-10-15 ENCOUNTER — OFFICE VISIT (OUTPATIENT)
Dept: CARDIOLOGY | Facility: CLINIC | Age: 64
End: 2024-10-15
Payer: COMMERCIAL

## 2024-10-15 VITALS
OXYGEN SATURATION: 99 % | HEIGHT: 66 IN | BODY MASS INDEX: 29.2 KG/M2 | SYSTOLIC BLOOD PRESSURE: 152 MMHG | HEART RATE: 47 BPM | DIASTOLIC BLOOD PRESSURE: 82 MMHG

## 2024-10-15 DIAGNOSIS — I10 PRIMARY HYPERTENSION: ICD-10-CM

## 2024-10-15 DIAGNOSIS — R00.1 BRADYCARDIA, SINUS: ICD-10-CM

## 2024-10-15 DIAGNOSIS — I48.0 PAROXYSMAL ATRIAL FIBRILLATION: Primary | ICD-10-CM

## 2024-10-15 PROCEDURE — 99204 OFFICE O/P NEW MOD 45 MIN: CPT | Performed by: STUDENT IN AN ORGANIZED HEALTH CARE EDUCATION/TRAINING PROGRAM

## 2024-10-15 RX ORDER — FLECAINIDE ACETATE 100 MG/1
100 TABLET ORAL 2 TIMES DAILY
Qty: 60 TABLET | Refills: 3 | Status: SHIPPED | OUTPATIENT
Start: 2024-10-15

## 2024-10-15 NOTE — PROGRESS NOTES
Cardiac Electrophysiology Outpatient Note  [facility]    Office Visit     Mercedes Dale  [id number]  10/15/2024    Primary Care Physician: Jessica Graham MD    Referred By: Jon Ramirez MD    Subjective     Chief Complaint   Patient presents with    Slow Heart Rate    Palpitations       History of Present Illness:   Mercedes Dale is a 63 y.o. female who presents to my electrophysiology clinic for evaluation of atrial fibrillation and bradycardia.  She says she was diagnosed with atrial fibrillation in 2019.  At that time she had palpitations and fluttering.  She has been treated with sotalol and more recently flecainide.  She does continue to have paroxysms of atrial fibrillation.  Via her Apple Watch she has approximately 2 to 7% burden.  She wore a monitor showing a 3% burden with pauses up to 3.7 seconds.  Average heart rate was 48 bpm.  She does complain of fatigue.  Has not had any presyncope or syncope.  Past Medical History:   Diagnosis Date    Abnormal heart rhythm     Anxiety 09/01/2016    Anxiety     Atrial fibrillation     Back pain     Constipation 09/01/2016    Decreased libido     Diverticulitis     Heart murmur     Heart palpitations     Heartburn     History of echocardiogram     History of Holter monitoring     History of stress test     Hot flashes 09/01/2016    Hyperlipidemia     Hypertension 09/01/2016    Insomnia 09/01/2016    Mitral valve prolapse     Other specified glaucoma     Reflux esophagitis     Sinusitis     GEORGE (stress urinary incontinence, female) 09/01/2016       Past Surgical History:   Procedure Laterality Date    GALLBLADDER SURGERY  2012    KNEE ARTHROSCOPY      scope twice    LAPAROSCOPIC ASSISTED VAGINAL HYSTERECTOMY SALPINGO OOPHORECTOMY  2011    benign       Family History   Problem Relation Age of Onset    Stroke Mother     Dementia Mother     Mitral valve prolapse Mother     Heart attack Mother     Cancer Mother      COPD Mother     Kidney disease Mother     Hypertension Mother     Heart attack Father     Heart failure Father     Hypertension Sister     Thyroid disease Sister     Anxiety disorder Sister     Depression Sister     Diabetes Maternal Grandmother     Diabetes Paternal Grandmother     Breast cancer Cousin         paternal       Social History     Socioeconomic History    Marital status:     Number of children: 2    Highest education level: Some college, no degree   Tobacco Use    Smoking status: Never     Passive exposure: Never    Smokeless tobacco: Never   Vaping Use    Vaping status: Never Used   Substance and Sexual Activity    Alcohol use: No    Drug use: Never    Sexual activity: Yes     Partners: Male     Birth control/protection: Hysterectomy, Surgical         Current Outpatient Medications:     atorvastatin (LIPITOR) 10 MG tablet, Take 1 tablet by mouth Daily., Disp: 90 tablet, Rfl: 1    Calcium Carbonate-Vit D-Min (Caltrate 600+D Plus Minerals) 600-800 MG-UNIT tablet, Take 1 tablet by mouth Daily., Disp: 90 tablet, Rfl: 3    cetirizine (zyrTEC) 10 MG tablet, Take 1 tablet by mouth Daily. (Patient taking differently: Take 1 tablet by mouth As Needed.), Disp: 14 tablet, Rfl: 0    dilTIAZem CD (CARDIZEM CD) 120 MG 24 hr capsule, TAKE ONE CAPSULE BY MOUTH EVERY DAY, Disp: 90 capsule, Rfl: 5    Eliquis 5 MG tablet tablet, TAKE ONE TABLET BY MOUTH EVERY 12 HOURS, Disp: 60 tablet, Rfl: 0    estradiol (ESTRACE) 0.5 MG tablet, Take 1 tablet by mouth Daily., Disp: 90 tablet, Rfl: 3    flecainide (TAMBOCOR) 100 MG tablet, Take 1 tablet by mouth 2 (Two) Times a Day., Disp: 60 tablet, Rfl: 3    methocarbamol (ROBAXIN) 500 MG tablet, Take 1 tablet by mouth 2 (Two) Times a Day As Needed for Muscle Spasms., Disp: 30 tablet, Rfl: 0    Multiple Vitamins-Minerals (THRIVE FOR LIFE WOMENS) tablet, Take 1 tablet by mouth Daily., Disp: , Rfl:     pseudoephedrine (SUDAFED) 60 MG tablet, Take 1 tablet by mouth 2 (Two) Times a  "Day. (Patient taking differently: Take 1 tablet by mouth 2 (Two) Times a Day As Needed for Congestion.), Disp: 60 tablet, Rfl: 2    timolol (TIMOPTIC) 0.25 % ophthalmic solution, 1 drop 2 (Two) Times a Day., Disp: , Rfl:     zolpidem (AMBIEN) 10 MG tablet, Take 1 tablet by mouth At Night As Needed for Sleep., Disp: 30 tablet, Rfl: 5    simethicone (MYLICON) 80 MG chewable tablet, Chew 1 tablet Every 6 (Six) Hours As Needed for Flatulence. (Patient not taking: Reported on 10/15/2024), Disp: 120 tablet, Rfl: 0    vitamin D (ERGOCALCIFEROL) 1.25 MG (01312 UT) capsule capsule, TAKE ONE CAPSULE BY MOUTH EVERY WEEK FOR VITAMIN DEFICIENCY (Patient not taking: Reported on 10/15/2024), Disp: 12 capsule, Rfl: 0    Allergies:   Allergies   Allergen Reactions    Codeine Nausea And Vomiting    Hydrocodone Nausea And Vomiting    Morphine Nausea And Vomiting    Azithromycin Other (See Comments)     Blisters in mouth and nose    Adhesive Tape Itching    Penicillins Other (See Comments)     Intolerance       Objective   Vital Signs: Blood pressure 152/82, pulse (!) 47, height 167.6 cm (65.98\"), SpO2 99%, not currently breastfeeding.    PHYSICAL EXAM  General appearance: Awake, alert, cooperative  Head: Normocephalic, without obvious abnormality, atraumatic  Neck: No JVD  Lungs: Clear to ascultation bilaterally  Heart: Regular rate and rhythm, no murmurs, 2+ LE pulses, no lower extremity swelling  Skin: Skin color, turgor normal, no rashes or lesions  Neurologic: Grossly normal     Lab Results   Component Value Date    GLUCOSE 103 (H) 06/18/2024    CALCIUM 8.8 06/18/2024     06/18/2024    K 4.3 06/18/2024    CO2 25.0 06/18/2024     06/18/2024    BUN 14 06/18/2024    CREATININE 0.94 06/18/2024    EGFRIFNONA 46 (L) 08/06/2021    BCR 14.9 06/18/2024    ANIONGAP 10.0 06/18/2024     Lab Results   Component Value Date    WBC 8.33 03/18/2024    HGB 15.3 03/18/2024    HCT 44.5 03/18/2024    MCV 93.9 03/18/2024     " 03/18/2024     Lab Results   Component Value Date    INR 1.10 08/06/2021    PROTIME 14.7 (H) 08/06/2021     Lab Results   Component Value Date    TSH 1.800 06/18/2024          Results for orders placed during the hospital encounter of 09/05/18    Adult Transthoracic Echo Complete W/ Cont if Necessary Per Protocol    Interpretation Summary  · Left ventricular systolic function is normal. Estimated EF = 60%.  · Mild aortic valve regurgitation is present.         I personally viewed and interpreted the patient's EKG/Telemetry/lab data    Procedures    Mercedes Dale  reports that she has never smoked. She has never been exposed to tobacco smoke. She has never used smokeless tobacco.     Advance Care Planning   Advance Care Planning: ACP discussion was held with the patient during this visit. Patient does not have an advance directive, information provided.     Assessment & Plan    1. Paroxysmal atrial fibrillation  Patient has a history of paroxysmal atrial fibrillation currently on flecainide 50 mg twice daily.  She does seem to have symptoms with this including palpitations.  She also has sinus node dysfunction as noted below.    We discussed the pathophysiology of atrial fibrillation as well as treatment options going forward.  I do think she would be a good candidate for catheter ablation.  With this we would potentially be able to eliminate some of her negative chronotropic medicines and allow for a faster heart rate.  We discussed how this is performed including the likely of success and potential risks.  She is somewhat hesitant at the current time which I think is reasonable.  For now she would like to increase her flecainide to 100 mg twice daily and see if this helps with her A-fib.  Will need to monitor for worsening bradycardia with this    2. Bradycardia, sinus  She does have sinus node dysfunction.  Monitor showed an average heart rate of 48 bpm.  She did also have some pauses when in atrial  fibrillation, that may have been vagal mediated.  We discussed options going forward.  As above we are able to get off of some of the negative chronotropic medicines this may help.  We also discussed the option of pacemaker implantation.  This will mostly be based on whether her symptoms are enough to warrant this.  At the current time she would like to try to avoid this, which I think is fair.  We did discuss that at some point this may be necessary however.    3. Primary hypertension  Blood pressure is elevated today.  Blood pressure seems to be well-controlled at home.  She will check her blood pressure at home and report back to us    Follow Up:  Return in about 6 months (around 4/15/2025).      Thank you for allowing me to participate in the care of your patient. Please do not hesitate to contact me with additional questions or concerns.      Kenny Kimbrough M.D.  Cardiac Electrophysiologist  [facility] / [facility]

## 2024-10-28 DIAGNOSIS — E55.9 VITAMIN D DEFICIENCY: ICD-10-CM

## 2024-10-28 RX ORDER — ERGOCALCIFEROL 1.25 MG/1
CAPSULE, LIQUID FILLED ORAL
Qty: 12 CAPSULE | Refills: 0 | OUTPATIENT
Start: 2024-10-28

## 2024-10-28 RX ORDER — DILTIAZEM HYDROCHLORIDE 120 MG/1
120 CAPSULE, COATED, EXTENDED RELEASE ORAL DAILY
Qty: 90 CAPSULE | Refills: 5 | Status: SHIPPED | OUTPATIENT
Start: 2024-10-28

## 2024-10-28 RX ORDER — ERGOCALCIFEROL 1.25 MG/1
50000 CAPSULE, LIQUID FILLED ORAL
Qty: 12 CAPSULE | Refills: 0 | Status: SHIPPED | OUTPATIENT
Start: 2024-10-28

## 2024-10-28 NOTE — TELEPHONE ENCOUNTER
Patient indicated she had reached out to pharmacy and they indicated no refills on file.  I will resend as she IS TAKING IT...

## 2024-10-30 RX ORDER — TIMOLOL MALEATE 2.5 MG/ML
1 SOLUTION/ DROPS OPHTHALMIC 2 TIMES DAILY
Qty: 15 ML | Refills: 11 | Status: SHIPPED | OUTPATIENT
Start: 2024-10-30

## 2024-11-04 ENCOUNTER — OFFICE VISIT (OUTPATIENT)
Dept: CARDIOLOGY | Facility: CLINIC | Age: 64
End: 2024-11-04
Payer: COMMERCIAL

## 2024-11-04 VITALS
DIASTOLIC BLOOD PRESSURE: 78 MMHG | WEIGHT: 183.2 LBS | OXYGEN SATURATION: 98 % | BODY MASS INDEX: 29.44 KG/M2 | HEIGHT: 66 IN | HEART RATE: 55 BPM | SYSTOLIC BLOOD PRESSURE: 132 MMHG

## 2024-11-04 DIAGNOSIS — I10 PRIMARY HYPERTENSION: Primary | ICD-10-CM

## 2024-11-04 PROCEDURE — 99213 OFFICE O/P EST LOW 20 MIN: CPT | Performed by: INTERNAL MEDICINE

## 2024-11-04 RX ORDER — FLECAINIDE ACETATE 100 MG/1
100 TABLET ORAL 2 TIMES DAILY
Qty: 180 TABLET | Refills: 3 | Status: SHIPPED | OUTPATIENT
Start: 2024-11-04

## 2024-11-04 NOTE — PROGRESS NOTES
[facility]  Office Progress Note  Mercedes Dale  1960  [address] [place] [address]       Visit Date: 11/04/24    PCP: Jessica Graham MD  [address] [place] [address]    IDENTIFICATION: A 63 y.o. 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    PROBLEM LIST:   Chest pain  8/21 exercise MPS: Exercise Cardiolite WNL.  LVEF >70%.   Expected = 6:55.  Actual = 8:45.   Low risk test.  Palpitations/paroxysmal atrial fibrillation per Dr Kimbrough  9/5/18 echo: EF 60% mild AI  9/18 Event monitor - short lived afib  7/24 48 hr holter 3% afib(longest >1 hour)  w 2 conv pauses.  Increased flec to 100 bid (offered RFA)  HL  9/10/18   HDL 40 LDL 88, on atorvastatin  6/21 135/379/29/67  Hypertension  GERD      CC:   Fu paf    Allergies  Allergies   Allergen Reactions    Codeine Nausea And Vomiting    Hydrocodone Nausea And Vomiting    Morphine Nausea And Vomiting    Azithromycin Other (See Comments)     Blisters in mouth and nose    Adhesive Tape Itching    Penicillins Other (See Comments)     Intolerance       Current Medications    Current Outpatient Medications:     atorvastatin (LIPITOR) 10 MG tablet, Take 1 tablet by mouth Daily., Disp: 90 tablet, Rfl: 1    Calcium Carbonate-Vit D-Min (Caltrate 600+D Plus Minerals) 600-800 MG-UNIT tablet, Take 1 tablet by mouth Daily., Disp: 90 tablet, Rfl: 3    cetirizine (zyrTEC) 10 MG tablet, Take 1 tablet by mouth Daily. (Patient taking differently: Take 1 tablet by mouth As Needed.), Disp: 14 tablet, Rfl: 0    dilTIAZem CD (CARDIZEM CD) 120 MG 24 hr capsule, Take 1 capsule by mouth Daily., Disp: 90 capsule, Rfl: 5    Eliquis 5 MG tablet tablet, TAKE ONE TABLET BY MOUTH EVERY 12 HOURS, Disp: 60 tablet, Rfl: 0    estradiol (ESTRACE) 0.5 MG tablet, Take 1 tablet by mouth Daily., Disp: 90 tablet, Rfl: 3    flecainide (TAMBOCOR) 100 MG tablet, Take 1 tablet by mouth 2 (Two) Times a Day., Disp: 180 tablet, Rfl: 3    methocarbamol  "(ROBAXIN) 500 MG tablet, Take 1 tablet by mouth 2 (Two) Times a Day As Needed for Muscle Spasms., Disp: 30 tablet, Rfl: 0    Multiple Vitamins-Minerals (THRIVE FOR LIFE WOMENS) tablet, Take 1 tablet by mouth Daily., Disp: , Rfl:     pseudoephedrine (SUDAFED) 60 MG tablet, Take 1 tablet by mouth 2 (Two) Times a Day. (Patient taking differently: Take 1 tablet by mouth 2 (Two) Times a Day As Needed for Congestion.), Disp: 60 tablet, Rfl: 2    simethicone (MYLICON) 80 MG chewable tablet, Chew 1 tablet Every 6 (Six) Hours As Needed for Flatulence., Disp: 120 tablet, Rfl: 0    timolol (TIMOPTIC) 0.25 % ophthalmic solution, Administer 1 drop to both eyes 2 (Two) Times a Day., Disp: 15 mL, Rfl: 11    vitamin D (ERGOCALCIFEROL) 1.25 MG (76236 UT) capsule capsule, Take 1 capsule by mouth Every 7 (Seven) Days., Disp: 12 capsule, Rfl: 0    zolpidem (AMBIEN) 10 MG tablet, Take 1 tablet by mouth At Night As Needed for Sleep., Disp: 30 tablet, Rfl: 5      History of Present Illness   Mercedes Dale is a 63 y.o. year old female here for follow up.  She had seen Dr. Kimbrough less than a month ago and was instructed to increase her flecainide.  She was confused and did not increase her flecainide.  States she continues to have 3% atrial fibrillation that is what we have seen on her monitor historically    OBJECTIVE:  Vitals:    11/04/24 1043   BP: 132/78   BP Location: Right arm   Patient Position: Sitting   Cuff Size: Adult   Pulse: 55   SpO2: 98%   Weight: 83.1 kg (183 lb 3.2 oz)   Height: 167.6 cm (66\")       Body mass index is 29.57 kg/m².    Constitutional:       Appearance: Healthy appearance. Not in distress.   Neck:      Vascular: No JVR. JVD normal.   Pulmonary:      Effort: Pulmonary effort is normal.      Breath sounds: Normal breath sounds. No wheezing. No rhonchi. No rales.   Chest:      Chest wall: Not tender to palpatation.   Cardiovascular:      PMI at left midclavicular line. Normal rate. Regular rhythm. Normal S1. " Normal S2.       Murmurs: There is no murmur.      No gallop.  No click. No rub.   Pulses:     Intact distal pulses.   Edema:     Peripheral edema absent.   Abdominal:      General: Bowel sounds are normal.      Palpations: Abdomen is soft.      Tenderness: There is no abdominal tenderness.   Musculoskeletal: Normal range of motion.         General: No tenderness. Skin:     General: Skin is warm and dry.   Neurological:      General: No focal deficit present.      Mental Status: Alert and oriented to person, place and time.         Diagnostic Data:  Procedures      ASSESSMENT:   Diagnosis Plan   1. Primary hypertension                PLAN:  Paroxysmal A. fib [id number] 3 on Cartia as she has asymptomatic bradycardia at current 120 mg daily.  Reiterated need to increase flecainide and continue Eliquis.  She will have an EKG in 48 hours after adjustment    Hypertension controlled on Cartia aforementioned we will decrease dosing            Jon Ramirez MD, [facility]

## 2024-11-08 ENCOUNTER — CLINICAL SUPPORT (OUTPATIENT)
Dept: FAMILY MEDICINE CLINIC | Facility: CLINIC | Age: 64
End: 2024-11-08
Payer: COMMERCIAL

## 2024-11-08 DIAGNOSIS — E53.8 B12 DEFICIENCY: Primary | ICD-10-CM

## 2024-11-08 PROCEDURE — 96372 THER/PROPH/DIAG INJ SC/IM: CPT | Performed by: FAMILY MEDICINE

## 2024-11-08 RX ORDER — CYANOCOBALAMIN 1000 UG/ML
1000 INJECTION, SOLUTION INTRAMUSCULAR; SUBCUTANEOUS ONCE
Status: COMPLETED | OUTPATIENT
Start: 2024-11-08 | End: 2024-11-08

## 2024-11-08 RX ADMIN — CYANOCOBALAMIN 1000 MCG: 1000 INJECTION, SOLUTION INTRAMUSCULAR; SUBCUTANEOUS at 12:36

## 2024-11-12 ENCOUNTER — OFFICE VISIT (OUTPATIENT)
Dept: PULMONOLOGY | Facility: CLINIC | Age: 64
End: 2024-11-12
Payer: COMMERCIAL

## 2024-11-12 VITALS
SYSTOLIC BLOOD PRESSURE: 118 MMHG | WEIGHT: 183 LBS | DIASTOLIC BLOOD PRESSURE: 68 MMHG | TEMPERATURE: 96.9 F | BODY MASS INDEX: 29.41 KG/M2 | HEART RATE: 88 BPM | OXYGEN SATURATION: 93 % | HEIGHT: 66 IN

## 2024-11-12 DIAGNOSIS — G47.09 OTHER INSOMNIA: ICD-10-CM

## 2024-11-12 PROCEDURE — 99213 OFFICE O/P EST LOW 20 MIN: CPT | Performed by: NURSE PRACTITIONER

## 2024-11-12 RX ORDER — ZOLPIDEM TARTRATE 10 MG/1
10 TABLET ORAL NIGHTLY PRN
Qty: 30 TABLET | Refills: 5 | Status: SHIPPED | OUTPATIENT
Start: 2024-11-12

## 2024-11-12 NOTE — PROGRESS NOTES
"Chief Complaint  Other insomnia    Subjective          Mercedes Dale presents to [facility] for   History of Present Illness    Ms. Dale is a 63 year old female with a medical history significant for anxiety, insomnia, hypertension, anxiety, and hyperlipidemia.    She presents today for follow up on insomnia.  She reports that she is doing well.  She states that she is taking Ambien 10 mg nightly.  She voices no complaints today.    Objective   Vital Signs:   /68   Pulse 88   Temp 96.9 °F (36.1 °C)   Ht 167.6 cm (65.98\")   Wt 83 kg (183 lb)   SpO2 93%   BMI 29.55 kg/m²         Physical Exam    GENERAL APPEARANCE: Well developed, well nourished, alert and cooperative, and appears to be in no acute distress.    HEAD: normocephalic. Atraumatic.    EYES: PERRL, EOMI. Vision is grossly intact.    THROAT: Oral cavity and pharynx normal. No inflammation, swelling, exudate, or lesions.     NECK: Neck supple.  No thyromegaly.    CARDIAC: Normal S1 and S2. No S3, S4 or murmurs. Rhythm is regular.     RESPIRATORY:Bilateral air entry positive.  No wheezing, crackles or rhonchi noted.    GI: Positive bowel sounds. Soft, nondistended, nontender.     MUSCULOSKELETAL: No significant deformity or joint abnormality. No edema. Peripheral pulses intact. No varicosities.    NEUROLOGICAL: Strength and sensation symmetric and intact throughout.     PSYCHIATRIC: The mental examination revealed the patient was oriented to person, place, and time.       Estimated body mass index is 29.55 kg/m² as calculated from the following:    Height as of this encounter: 167.6 cm (65.98\").    Weight as of this encounter: 83 kg (183 lb).        Result Review :   The following data was reviewed by: DARIO Junior on 11/12/2024:  Common labs          11/21/2023    12:23 3/18/2024    11:27 6/18/2024    12:01   Common Labs   Glucose 110  120  103    BUN 16  15  14    Creatinine " "0.88  1.00  0.94    Sodium 141  140  140    Potassium 4.8  4.6  4.3    Chloride 105  103  105    Calcium 9.6  9.4  8.8    Albumin 4.6  4.3  4.4    Total Bilirubin 0.5  0.6  0.6    Alkaline Phosphatase 55  65  53    AST (SGOT) 27  30  29    ALT (SGPT) 39  47  38    WBC  8.33     Hemoglobin  15.3     Hematocrit  44.5     Platelets  259     Total Cholesterol 152      Triglycerides 262      HDL Cholesterol 33      LDL Cholesterol  76             PFT:NA    Low dose lung cancer screening:NA    Previous chest imaging:NA    Alpha-1 antitrypsin screening:NA    STOP-Bang Score:   NA  [place] Sleepiness Scale:   NA      ABG:    pH No results found for: \"PHART\"   pO2 No results found for: \"PO2ART\"   pCO2 No results found for: \"EIV4FCX\"   HCO3 No results found for: \"TIG7NOE\"                      Assessment and Plan    Problem List Items Addressed This Visit          Sleep    Insomnia    Relevant Medications    zolpidem (AMBIEN) 10 MG tablet       Mercedes Dale  reports that she has never smoked. She has never been exposed to tobacco smoke. She has never used smokeless tobacco.     Continue Ambien 5-10 mg nightly.    The problem of recurrent insomnia is discussed. Avoidance of caffeine sources is strongly encouraged. Sleep hygiene issues are reviewed.     Refills sent.    Follow Up   Return in about 6 months (around 5/12/2025).  Patient was given instructions and counseling regarding her condition or for health maintenance advice. Please see specific information pulled into the AVS if appropriate.        "

## 2024-11-25 RX ORDER — ONDANSETRON 8 MG/1
8 TABLET, ORALLY DISINTEGRATING ORAL EVERY 8 HOURS PRN
Qty: 90 TABLET | Refills: 0 | Status: SHIPPED | OUTPATIENT
Start: 2024-11-25

## 2024-11-25 RX ORDER — LOPERAMIDE HYDROCHLORIDE 2 MG/1
2 TABLET ORAL 4 TIMES DAILY PRN
Qty: 120 TABLET | Refills: 0 | Status: SHIPPED | OUTPATIENT
Start: 2024-11-25

## 2024-12-13 RX ORDER — APIXABAN 5 MG/1
TABLET, FILM COATED ORAL
Qty: 120 TABLET | Refills: 3 | Status: SHIPPED | OUTPATIENT
Start: 2024-12-13

## 2025-01-06 ENCOUNTER — OFFICE VISIT (OUTPATIENT)
Dept: FAMILY MEDICINE CLINIC | Facility: CLINIC | Age: 65
End: 2025-01-06
Payer: COMMERCIAL

## 2025-01-06 VITALS
TEMPERATURE: 96.2 F | WEIGHT: 182 LBS | HEART RATE: 59 BPM | DIASTOLIC BLOOD PRESSURE: 80 MMHG | BODY MASS INDEX: 29.25 KG/M2 | SYSTOLIC BLOOD PRESSURE: 128 MMHG | OXYGEN SATURATION: 99 % | HEIGHT: 66 IN

## 2025-01-06 DIAGNOSIS — J20.9 ACUTE BRONCHITIS, UNSPECIFIED ORGANISM: Primary | ICD-10-CM

## 2025-01-06 DIAGNOSIS — E78.49 OTHER HYPERLIPIDEMIA: ICD-10-CM

## 2025-01-06 DIAGNOSIS — E53.8 B12 DEFICIENCY: ICD-10-CM

## 2025-01-06 DIAGNOSIS — E55.9 VITAMIN D DEFICIENCY: ICD-10-CM

## 2025-01-06 DIAGNOSIS — G47.09 OTHER INSOMNIA: ICD-10-CM

## 2025-01-06 DIAGNOSIS — Z87.898 HISTORY OF MOTION SICKNESS: ICD-10-CM

## 2025-01-06 PROCEDURE — 99214 OFFICE O/P EST MOD 30 MIN: CPT | Performed by: FAMILY MEDICINE

## 2025-01-06 RX ORDER — SCOLOPAMINE TRANSDERMAL SYSTEM 1 MG/1
1 PATCH, EXTENDED RELEASE TRANSDERMAL
Qty: 10 EACH | Refills: 2 | Status: SHIPPED | OUTPATIENT
Start: 2025-01-06

## 2025-01-06 RX ORDER — LOPERAMIDE HYDROCHLORIDE 2 MG/1
2 TABLET ORAL 4 TIMES DAILY PRN
Qty: 120 TABLET | Refills: 0 | Status: SHIPPED | OUTPATIENT
Start: 2025-01-06

## 2025-01-06 RX ORDER — CIPROFLOXACIN 500 MG/1
500 TABLET, FILM COATED ORAL 2 TIMES DAILY
Qty: 14 TABLET | Refills: 0 | Status: SHIPPED | OUTPATIENT
Start: 2025-01-06

## 2025-01-06 RX ORDER — ONDANSETRON 8 MG/1
8 TABLET, ORALLY DISINTEGRATING ORAL EVERY 8 HOURS PRN
Qty: 90 TABLET | Refills: 0 | Status: SHIPPED | OUTPATIENT
Start: 2025-01-06

## 2025-01-06 RX ORDER — DOXYCYCLINE 100 MG/1
100 CAPSULE ORAL 2 TIMES DAILY
Qty: 14 CAPSULE | Refills: 0 | Status: SHIPPED | OUTPATIENT
Start: 2025-01-06

## 2025-01-14 DIAGNOSIS — E55.9 VITAMIN D DEFICIENCY: ICD-10-CM

## 2025-01-15 RX ORDER — ERGOCALCIFEROL 1.25 MG/1
50000 CAPSULE, LIQUID FILLED ORAL
Qty: 12 CAPSULE | Refills: 0 | Status: SHIPPED | OUTPATIENT
Start: 2025-01-15

## 2025-01-27 NOTE — PROGRESS NOTES
"Mercedes Dale     VITALS: Blood pressure 128/80, pulse 59, temperature 96.2 °F (35.7 °C), height 167.6 cm (65.98\"), weight 82.6 kg (182 lb), SpO2 99%, not currently breastfeeding.    Subjective  Chief Complaint  Hyperlipidemia    Subjective          History of Present Illness:    History of Present Illness  The patient is a 64-year-old female with medical conditions significant for hyperlipidemia, atrial fibrillation, and insomnia who presents to the clinic for a medical follow-up.    She reports an improvement in her gastrointestinal symptoms, attributing this to the use of Seed, which she has been taking for a month. She experiences bowel movements twice daily, which are softer in consistency. However, she notes a sensation of blockage when consuming even small amounts of food. She is considering resuming the Mediterranean diet. She has been supplementing with vitamin C and zinc. She recalls a significant fall in her kitchen that resulted in bruising along her side, from which she has not fully recovered.    She has been experiencing persistent sneezing and mucus production, initially resembling flu-like symptoms with body aches and severe headaches. She also reports a dry cough, which was productive of yellow and green sputum towards the end of the previous week. She has not undergone any testing for COVID-19. She has been managing her symptoms with Mucinex once daily for sinus and head congestion and has been using peppermints for her cough. She has not been gargling. She has been applying Vaseline to her nose since the previous week.    She is planning a cruise trip and requires a prescription for scopolamine due to her susceptibility to motion sickness. She expresses concern about potential exposure to norovirus on the ship. She has sufficient supplies of anti-nausea and anti-diarrheal medications but will refill them if necessary. She received her influenza vaccine on 11/12/2024 at Lisa Nichole's office. " She is scheduled to see her gynecologist for prolapse on Wednesday.    IMMUNIZATIONS  She received her influenza vaccine on 11/12/2024 at Lisa Nichole's office.    No complaints regarding medications.     The following portions of the patient's history were reviewed and updated as appropriate: allergies, current medications, past family history, past medical history, past social history, past surgical history and problem list.    Past Medical History  Past Medical History:   Diagnosis Date    Anxiety 09/01/2016    Atrial fibrillation     Constipation 09/01/2016    Diverticulitis     Hyperlipidemia     Hypertension 09/01/2016    Insomnia 09/01/2016    Mitral valve prolapse     Other specified glaucoma     Reflux esophagitis        Surgical History  Past Surgical History:   Procedure Laterality Date    GALLBLADDER SURGERY  2012    KNEE ARTHROSCOPY      scope twice    LAPAROSCOPIC ASSISTED VAGINAL HYSTERECTOMY SALPINGO OOPHORECTOMY  2011    Dr. Jang - benign pathology       Family History  Family History   Problem Relation Age of Onset    Stroke Mother     Dementia Mother     Mitral valve prolapse Mother     Heart attack Mother     Cancer Mother     COPD Mother     Kidney disease Mother     Hypertension Mother     Heart attack Father     Heart failure Father     Hypertension Sister     Thyroid disease Sister     Anxiety disorder Sister     Depression Sister     Diabetes Maternal Grandmother     Diabetes Paternal Grandmother     Breast cancer Cousin         paternal       Social History  Social History     Socioeconomic History    Marital status:     Number of children: 2    Highest education level: Some college, no degree   Tobacco Use    Smoking status: Never     Passive exposure: Never    Smokeless tobacco: Never   Vaping Use    Vaping status: Never Used   Substance and Sexual Activity    Alcohol use: No    Drug use: Never    Sexual activity: Yes     Partners: Male     Birth control/protection:  "Hysterectomy, Surgical       Objective   Vital Signs:   /80 (BP Location: Right arm, Patient Position: Sitting, Cuff Size: Adult)   Pulse 59   Temp 96.2 °F (35.7 °C)   Ht 167.6 cm (65.98\")   Wt 82.6 kg (182 lb)   SpO2 99%   BMI 29.39 kg/m²       Physical Exam     Physical Exam  Lungs were auscultated.    Gen: Patient in NAD. Pleasant and answers appropriately. A&Ox3.    Skin: Warm and dry with normal turgor. No purpura, rashes, or unusual pigmentation noted. Hair is normal in appearance and distribution.    HEENT: NC/AT. No lesions noted. Conjunctiva clear, sclera nonicteric. PERRL. EOMI without nystagmus or strabismus. Fundi appear benign. No hemorrhages or exudates of eyes. Auditory canals are patent bilaterally without lesions. TMs intact,  nonerythematous, bulging without lesions. Nasal mucosa erythematous, and nonedematous. Frontal and maxillary sinuses are nontender. O/P erythematous and moist without exudate.    Neck: Supple without lymph nodes palpated. FROM. No carotid bruits appreciated bilaterally.    Lungs: Slightly decreased B/L without rales, rhonchi, crackles, or wheezes.    Heart: RRR. S1 and S2 normal. No S3 or S4. No MRGT.    Abd: Soft, nontender,nondistended. (+)BSx4 quadrants.     Extrem: No CCE. Radial pulses 2+/4 and equal B/L. FROMx4. No bone, joint, or muscle tenderness noted.    Neuro: No focal motor/sensory deficits.    Procedures    Result Review :   The following data was reviewed by: Jessica Graham MD on 01/06/2025:       Results             Assessment and Plan      Mercedes Dale is a 64 y.o. here for medical followup.    Diagnoses and all orders for this visit:    1. Acute bronchitis, unspecified organism (Primary)  -     doxycycline (VIBRAMYCIN) 100 MG capsule; Take 1 capsule by mouth 2 (Two) Times a Day.  Dispense: 14 capsule; Refill: 0  -     mupirocin (BACTROBAN) 2 % nasal ointment; Administer 1 Application into the nostril(s) as directed by provider 2 (Two) Times " a Day.  Dispense: 30 g; Refill: 0    2. Other hyperlipidemia  -     Comprehensive Metabolic Panel; Future  -     Lipid Panel; Future    3. Vitamin D deficiency  -     Vitamin D,25-Hydroxy; Future    4. Other insomnia  -     TSH; Future  -     T4, Free; Future    5. B12 deficiency  -     Cyanocobalamin 1000 MCG capsule; Take 1 capsule by mouth Daily.  Dispense: 90 capsule; Refill: 1    6. History of motion sickness  -     Scopolamine 1 MG/3DAYS patch; Place 1 patch on the skin as directed by provider Every 72 (Seventy-Two) Hours.  Dispense: 10 each; Refill: 2  -     ondansetron ODT (ZOFRAN-ODT) 8 MG disintegrating tablet; Place 1 tablet on the tongue Every 8 (Eight) Hours As Needed for Vomiting or Nausea.  Dispense: 90 tablet; Refill: 0  -     loperamide (Imodium A-D) 2 MG tablet; Take 1 tablet by mouth 4 (Four) Times a Day As Needed for Diarrhea.  Dispense: 120 tablet; Refill: 0  -     ciprofloxacin (CIPRO) 500 MG tablet; Take 1 tablet by mouth 2 (Two) Times a Day.  Dispense: 14 tablet; Refill: 0          Assessment & Plan  1. Hyperlipidemia.  She is currently on medication for hyperlipidemia. She has been advised to take her cholesterol medication consistently, preferably after dinner, to avoid forgetting it at bedtime.    2.  Bronchitis  Her symptoms do not align with a typical influenza presentation. She has been advised to continue her Mucinex regimen. She has been instructed to gargle with salt water and honey to alleviate her throat discomfort. A prescription for doxycycline has been provided.    3. Motion sickness.  Prescriptions for scopolamine, Zofran, Imodium, and Cipro have been provided. She has been advised to receive her influenza vaccine prior to her cruise trip.    4. Health maintenance.  She is due for a mammogram at the end of February 2025. If it has not been completed by the next visit, an order will be placed.      BMI is >= 25 and <30. (Overweight) The following options were offered after  discussion;: exercise counseling/recommendations and nutrition counseling/recommendations             Patient or patient representative verbalized consent for the use of Ambient Listening during the visit with  Jessica Graham MD for chart documentation. 1/26/2025  23:07 EST        Follow Up   Return in about 3 months (around 4/6/2025), or will come back for fasting labs.  Findings and plans discussed with patient who verbalizes understanding and agreement. Will followup with patient once results are in. Patient was given instructions and counseling regarding her condition or for health maintenance advice. Please see specific information pulled into the AVS if appropriate.       Jessica Graham MD

## 2025-01-28 ENCOUNTER — TELEPHONE (OUTPATIENT)
Dept: FAMILY MEDICINE CLINIC | Facility: CLINIC | Age: 65
End: 2025-01-28
Payer: COMMERCIAL

## 2025-01-28 RX ORDER — BENZONATATE 100 MG/1
100 CAPSULE ORAL 3 TIMES DAILY PRN
Qty: 90 CAPSULE | Refills: 0 | Status: SHIPPED | OUTPATIENT
Start: 2025-01-28

## 2025-01-28 NOTE — TELEPHONE ENCOUNTER
Tessalon sent to her pharmacy. I wouldn't take the chance on it either, but I thought I would offer her them because that would be the next step. If she still has symptoms when they get back, can give then.

## 2025-01-28 NOTE — TELEPHONE ENCOUNTER
----- Message from Jessica Graham sent at 1/28/2025 10:28 AM EST -----  Does she want to try 3 way cough syrup or tessalon perles for that cough? We could also try a course of prednisone, but that might leave her a little bit more vulnerable for other illnesses abroad the ship.

## 2025-01-28 NOTE — TELEPHONE ENCOUNTER
Spoke with Laure she would like the Tessalon for herself,they work for her,no prednisone wouldn't want to take that chance.

## 2025-02-07 ENCOUNTER — CLINICAL SUPPORT (OUTPATIENT)
Dept: FAMILY MEDICINE CLINIC | Facility: CLINIC | Age: 65
End: 2025-02-07
Payer: COMMERCIAL

## 2025-02-07 DIAGNOSIS — E53.8 B12 DEFICIENCY: Primary | ICD-10-CM

## 2025-02-07 PROCEDURE — 96372 THER/PROPH/DIAG INJ SC/IM: CPT | Performed by: FAMILY MEDICINE

## 2025-02-07 RX ORDER — CYANOCOBALAMIN 1000 UG/ML
1000 INJECTION, SOLUTION INTRAMUSCULAR; SUBCUTANEOUS ONCE
Status: COMPLETED | OUTPATIENT
Start: 2025-02-07 | End: 2025-02-07

## 2025-02-07 RX ADMIN — CYANOCOBALAMIN 1000 MCG: 1000 INJECTION, SOLUTION INTRAMUSCULAR; SUBCUTANEOUS at 11:08

## 2025-03-24 DIAGNOSIS — H69.93 EUSTACHIAN TUBE DYSFUNCTION, BILATERAL: ICD-10-CM

## 2025-03-24 DIAGNOSIS — E78.49 OTHER HYPERLIPIDEMIA: ICD-10-CM

## 2025-03-25 RX ORDER — ATORVASTATIN CALCIUM 10 MG/1
10 TABLET, FILM COATED ORAL DAILY
Qty: 90 TABLET | Refills: 1 | Status: SHIPPED | OUTPATIENT
Start: 2025-03-25

## 2025-03-25 RX ORDER — PSEUDOEPHEDRINE HYDROCHLORIDE 60 MG/1
60 TABLET, FILM COATED ORAL EVERY 12 HOURS SCHEDULED
Qty: 60 TABLET | Refills: 5 | Status: SHIPPED | OUTPATIENT
Start: 2025-03-25

## 2025-04-10 ENCOUNTER — OFFICE VISIT (OUTPATIENT)
Dept: FAMILY MEDICINE CLINIC | Facility: CLINIC | Age: 65
End: 2025-04-10
Payer: COMMERCIAL

## 2025-04-10 ENCOUNTER — LAB (OUTPATIENT)
Dept: FAMILY MEDICINE CLINIC | Facility: CLINIC | Age: 65
End: 2025-04-10
Payer: COMMERCIAL

## 2025-04-10 VITALS
SYSTOLIC BLOOD PRESSURE: 126 MMHG | TEMPERATURE: 96.8 F | RESPIRATION RATE: 16 BRPM | WEIGHT: 178.2 LBS | OXYGEN SATURATION: 99 % | BODY MASS INDEX: 28.64 KG/M2 | HEART RATE: 68 BPM | HEIGHT: 66 IN | DIASTOLIC BLOOD PRESSURE: 72 MMHG

## 2025-04-10 DIAGNOSIS — G47.09 OTHER INSOMNIA: ICD-10-CM

## 2025-04-10 DIAGNOSIS — E78.49 OTHER HYPERLIPIDEMIA: ICD-10-CM

## 2025-04-10 DIAGNOSIS — E55.9 VITAMIN D DEFICIENCY: ICD-10-CM

## 2025-04-10 DIAGNOSIS — M71.21 SYNOVIAL CYST OF RIGHT POPLITEAL SPACE: Primary | ICD-10-CM

## 2025-04-10 DIAGNOSIS — M43.6 TORTICOLLIS: ICD-10-CM

## 2025-04-10 DIAGNOSIS — I48.0 PAROXYSMAL ATRIAL FIBRILLATION: ICD-10-CM

## 2025-04-10 LAB
25(OH)D3 SERPL-MCNC: 48.7 NG/ML (ref 30–100)
ALBUMIN SERPL-MCNC: 4.3 G/DL (ref 3.5–5.2)
ALBUMIN/GLOB SERPL: 1.1 G/DL
ALP SERPL-CCNC: 60 U/L (ref 39–117)
ALT SERPL W P-5'-P-CCNC: 33 U/L (ref 1–33)
ANION GAP SERPL CALCULATED.3IONS-SCNC: 11.4 MMOL/L (ref 5–15)
AST SERPL-CCNC: 31 U/L (ref 1–32)
BILIRUB SERPL-MCNC: 0.6 MG/DL (ref 0–1.2)
BUN SERPL-MCNC: 18 MG/DL (ref 8–23)
BUN/CREAT SERPL: 16.2 (ref 7–25)
CALCIUM SPEC-SCNC: 9.6 MG/DL (ref 8.6–10.5)
CHLORIDE SERPL-SCNC: 105 MMOL/L (ref 98–107)
CHOLEST SERPL-MCNC: 170 MG/DL (ref 0–200)
CO2 SERPL-SCNC: 23.6 MMOL/L (ref 22–29)
CREAT SERPL-MCNC: 1.11 MG/DL (ref 0.57–1)
EGFRCR SERPLBLD CKD-EPI 2021: 55.6 ML/MIN/1.73
GLOBULIN UR ELPH-MCNC: 3.9 GM/DL
GLUCOSE SERPL-MCNC: 102 MG/DL (ref 65–99)
HDLC SERPL-MCNC: 36 MG/DL (ref 40–60)
LDLC SERPL CALC-MCNC: 94 MG/DL (ref 0–100)
LDLC/HDLC SERPL: 2.41 {RATIO}
POTASSIUM SERPL-SCNC: 3.9 MMOL/L (ref 3.5–5.2)
PROT SERPL-MCNC: 8.2 G/DL (ref 6–8.5)
SODIUM SERPL-SCNC: 140 MMOL/L (ref 136–145)
T4 FREE SERPL-MCNC: 1.11 NG/DL (ref 0.92–1.68)
TRIGL SERPL-MCNC: 236 MG/DL (ref 0–150)
TSH SERPL DL<=0.05 MIU/L-ACNC: 3.95 UIU/ML (ref 0.27–4.2)
VLDLC SERPL-MCNC: 40 MG/DL (ref 5–40)

## 2025-04-10 PROCEDURE — 99214 OFFICE O/P EST MOD 30 MIN: CPT | Performed by: FAMILY MEDICINE

## 2025-04-10 PROCEDURE — 84439 ASSAY OF FREE THYROXINE: CPT | Performed by: FAMILY MEDICINE

## 2025-04-10 PROCEDURE — 80061 LIPID PANEL: CPT | Performed by: FAMILY MEDICINE

## 2025-04-10 PROCEDURE — 82306 VITAMIN D 25 HYDROXY: CPT | Performed by: FAMILY MEDICINE

## 2025-04-10 PROCEDURE — 84443 ASSAY THYROID STIM HORMONE: CPT | Performed by: FAMILY MEDICINE

## 2025-04-10 PROCEDURE — 36415 COLL VENOUS BLD VENIPUNCTURE: CPT

## 2025-04-10 PROCEDURE — 80053 COMPREHEN METABOLIC PANEL: CPT | Performed by: FAMILY MEDICINE

## 2025-04-10 RX ORDER — FLECAINIDE ACETATE 50 MG/1
50 TABLET ORAL 2 TIMES DAILY
COMMUNITY
Start: 2025-02-22

## 2025-04-10 RX ORDER — ERGOCALCIFEROL 1.25 MG/1
50000 CAPSULE, LIQUID FILLED ORAL
Qty: 12 CAPSULE | Refills: 0 | Status: SHIPPED | OUTPATIENT
Start: 2025-04-10

## 2025-04-10 RX ORDER — DILTIAZEM HYDROCHLORIDE 120 MG/1
120 CAPSULE, EXTENDED RELEASE ORAL DAILY
COMMUNITY
Start: 2025-02-03

## 2025-04-18 ENCOUNTER — HOSPITAL ENCOUNTER (OUTPATIENT)
Dept: MAMMOGRAPHY | Facility: HOSPITAL | Age: 65
Discharge: HOME | End: 2025-04-18
Admitting: FAMILY MEDICINE
Payer: COMMERCIAL

## 2025-04-18 DIAGNOSIS — Z12.31 VISIT FOR SCREENING MAMMOGRAM: ICD-10-CM

## 2025-04-18 PROCEDURE — 77063 BREAST TOMOSYNTHESIS BI: CPT

## 2025-04-18 PROCEDURE — 77063 BREAST TOMOSYNTHESIS BI: CPT | Performed by: RADIOLOGY

## 2025-04-18 PROCEDURE — 77067 SCR MAMMO BI INCL CAD: CPT | Performed by: RADIOLOGY

## 2025-04-18 PROCEDURE — 77067 SCR MAMMO BI INCL CAD: CPT

## 2025-04-27 NOTE — PROGRESS NOTES
"Mercedes Dale     VITALS: Blood pressure 126/72, pulse 68, temperature 96.8 °F (36 °C), temperature source Temporal, resp. rate 16, height 167.6 cm (66\"), weight 80.8 kg (178 lb 3.2 oz), SpO2 99%, not currently breastfeeding.    Subjective  Chief Complaint  Knot on Back of Leg (Right Leg), Neck Pain, and B12 Deficiency    Subjective          History of Present Illness:    History of Present Illness  The patient is a 64-year-old female with medical conditions significant for hyperlipidemia, atrial fibrillation, and insomnia who presents to the clinic for a medical follow-up.    She reports overall good health but has not yet completed her blood work. She has submitted a urine sample today, noting an unusual color. She has not undergone a drug screen this year. She has been receiving B12 injections and requires a refill of her vitamin D prescription. She has been making dietary improvements and has experienced weight loss. She has a mammogram scheduled for next Friday.    She has been experiencing a persistent neck issue since May 2024, which has worsened with increased work hours. The pain has started to radiate to the other side of her neck and is present daily. She finds relief from essential oils but experiences nausea after 4 days of use due to the strong smell. She occasionally uses Bengay. She is considering massage therapy and is interested in exercises. She is currently taking methocarbamol 500 mg twice daily as needed for back pain, which she finds beneficial.    She has noticed a knot behind her knee, which has been present for some time but has recently decreased in size. She does not experience foot pain when wearing flat shoes. She recalls an incident where she was unable to walk due to severe leg soreness after riding horses with her daughter.    She has been experiencing a cough in the evenings, which she attributes to COVID-19. She has been performing deep breathing exercises.    She has been " sleeping better and feels more relaxed.    She has been diagnosed with glaucoma and has had two laser procedures in the last 3 months. Her eye pressures have come down, but she is going to try hard contact lenses to push down on the pressure of the cornea to correct that. She still wears lenses. She got new glasses and could not see anything.    She has been experiencing a loss of taste and smell since bhakti COVID-19. She has tried smell therapy but has not noticed any improvement. She no longer drinks coffee and has switched to Thrive, which contains a small amount of caffeine and vitamins. She does not find steak appealing and prefers chicken and salmon.      No complaints regarding medications.     The following portions of the patient's history were reviewed and updated as appropriate: allergies, current medications, past family history, past medical history, past social history, past surgical history and problem list.    Past Medical History  Past Medical History:   Diagnosis Date    Anxiety 2016    Arthritis     Atrial fibrillation     Constipation 2016    Diverticulitis     Fibroid     Glaucoma     Heart murmur     Hyperlipidemia     Hypertension 2016    Medication has been decreased    Insomnia 2016    Mitral valve prolapse     Other specified glaucoma     Pelvic prolapse     Reflux esophagitis     Sinusitis        Surgical History  Past Surgical History:   Procedure Laterality Date     SECTION  88    D & C WITH SUCTION      GALLBLADDER SURGERY      GLAUCOMA SURGERY Bilateral     KNEE ARTHROSCOPY      scope twice    LAPAROSCOPIC ASSISTED VAGINAL HYSTERECTOMY SALPINGO OOPHORECTOMY  2012    Dr. Jang - benign pathology    LAPAROSCOPIC CHOLECYSTECTOMY  2012    TONSILLECTOMY         Family History  Family History   Problem Relation Age of Onset    Stroke Mother     Dementia Mother     Mitral valve prolapse Mother     Heart attack Mother      "Cancer Mother     COPD Mother     Kidney disease Mother         Left kidney remved    Hypertension Mother     Heart attack Father     Heart failure Father     Early death Father         Heart attack    Hypertension Sister     Thyroid disease Sister     Anxiety disorder Sister     Depression Sister     Diabetes Sister     Diabetes Maternal Grandmother     Diabetes Paternal Grandmother     Breast cancer Cousin         paternal       Social History  Social History     Socioeconomic History    Marital status:     Number of children: 2    Highest education level: Some college, no degree   Tobacco Use    Smoking status: Never     Passive exposure: Never    Smokeless tobacco: Never   Vaping Use    Vaping status: Never Used   Substance and Sexual Activity    Alcohol use: No    Drug use: Never    Sexual activity: Yes     Partners: Male     Birth control/protection: Hysterectomy, Surgical       Objective   Vital Signs:   /72 (BP Location: Right arm, Patient Position: Sitting, Cuff Size: Adult)   Pulse 68   Temp 96.8 °F (36 °C) (Temporal)   Resp 16   Ht 167.6 cm (66\")   Wt 80.8 kg (178 lb 3.2 oz)   SpO2 99%   BMI 28.76 kg/m²       Physical Exam     Physical Exam  Lungs were auscultated.  Ati's cyst noted behind the knee.    Gen: Patient in NAD. Pleasant and answers appropriately. A&Ox3.    Skin: Warm and dry with normal turgor. No purpura, rashes, or unusual pigmentation noted. Hair is normal in appearance and distribution.    HEENT: NC/AT. No lesions noted. Conjunctiva clear, sclera nonicteric. PERRL. EOMI without nystagmus or strabismus. Fundi appear benign. No hemorrhages or exudates of eyes. Auditory canals are patent bilaterally without lesions. TMs intact,  nonerythematous, bulging without lesions. Nasal mucosa pink, nonerythematous, and nonedematous. Frontal and maxillary sinuses are nontender. O/P erythematous and moist without exudate.    Neck: Supple without lymph nodes palpated.  Decreased ROM. " No carotid bruits appreciated bilaterally.    Lungs: CTA B/L without rales, rhonchi, crackles, or wheezes.    Heart: Irregularly irregular. S1 and S2 normal. No S3 or S4. No MRGT.    Abd: Soft, nontender,nondistended. (+)BSx4 quadrants.     Extrem: No CCE. Radial pulses 2+/4 and equal B/L. FROMx4.  Positive joint tenderness noted.    Neuro: No focal motor/sensory deficits.    Procedures    Result Review :   The following data was reviewed by: Jessica Graham MD on 04/10/2025:       Results             Assessment and Plan      Mercedes Dale is a 64 y.o. here for medical followup.    Diagnoses and all orders for this visit:    1. Synovial cyst of right popliteal space (Primary)  The presence of a Baker's cyst was confirmed behind her knee. It was explained that the cyst likely formed as a protective response to muscle irritation but is causing discomfort by compressing the nerve. She was advised to continue massaging the area to facilitate the breakdown and eventual disappearance of the cyst.    2. Torticollis  She was advised to utilize methocarbamol 500 mg twice a day for her neck pain. A set of exercises was provided for her to perform at home. If these interventions prove ineffective, a referral for physical therapy will be considered.    3. Vitamin D deficiency  -     vitamin D (ERGOCALCIFEROL) 1.25 MG (16050 UT) capsule capsule; Take 1 capsule by mouth Every 7 (Seven) Days.  Dispense: 12 capsule; Refill: 0    4. Paroxysmal atrial fibrillation  Currently on Eliquis 5 mg orally twice a day, diltiazem 120 mg orally daily, and flecainide 50 mg orally twice a day.      Assessment & Plan  1.  Vitamin D deficiency.  A refill of her vitamin D prescription was provided. Her vitamin D levels will be checked through blood work to determine if continued supplementation is necessary.    2. Glaucoma.  She reported recent changes in her vision and mentioned upcoming appointments with the [facility] for  further evaluation and potential hard contact lens fitting to manage her glaucoma.    3. Insomnia.  She reported improved sleep quality and increased relaxation. No changes to her current management plan were made.    4. Loss of taste and smell.  She has been experiencing a loss of taste and smell since bhakti COVID-19. She has tried smell therapy but has not noticed any improvement. She no longer drinks coffee and has switched to Thrive, which contains a small amount of caffeine and vitamins. She does not find steak appealing and prefers chicken and salmon.    5. Cough.  She has been experiencing a cough in the evenings, which she attributes to COVID-19. She has been performing deep breathing exercises.    PROCEDURE  The patient had two laser procedures for glaucoma in the last 3 months.       Patient or patient representative verbalized consent for the use of Ambient Listening during the visit with  Jessica Graham MD for chart documentation. 4/27/2025  19:08 EDT        Follow Up   Return in about 3 months (around 7/10/2025), or (LABS - from January).  Findings and plans discussed with patient who verbalizes understanding and agreement. Will followup with patient once results are in. Patient was given instructions and counseling regarding her condition or for health maintenance advice. Please see specific information pulled into the AVS if appropriate.       Jessica Graham MD

## 2025-05-06 ENCOUNTER — OFFICE VISIT (OUTPATIENT)
Dept: CARDIOLOGY | Facility: CLINIC | Age: 65
End: 2025-05-06
Payer: COMMERCIAL

## 2025-05-06 VITALS
DIASTOLIC BLOOD PRESSURE: 74 MMHG | WEIGHT: 178.6 LBS | HEART RATE: 56 BPM | OXYGEN SATURATION: 99 % | SYSTOLIC BLOOD PRESSURE: 124 MMHG | HEIGHT: 66 IN | BODY MASS INDEX: 28.7 KG/M2

## 2025-05-06 DIAGNOSIS — Z79.899 LONG TERM CURRENT USE OF ANTIARRHYTHMIC MEDICAL THERAPY: ICD-10-CM

## 2025-05-06 DIAGNOSIS — I48.0 PAROXYSMAL ATRIAL FIBRILLATION: Primary | Chronic | ICD-10-CM

## 2025-05-06 DIAGNOSIS — I10 PRIMARY HYPERTENSION: Chronic | ICD-10-CM

## 2025-05-06 PROCEDURE — 99214 OFFICE O/P EST MOD 30 MIN: CPT

## 2025-05-06 NOTE — PROGRESS NOTES
Cardiac Electrophysiology Outpatient Note  [facility] at [facility]    Office Visit     Mercedes Dale  [id number]  05/06/2025    Primary Care Physician: Jessica Graham MD    Referred By: Jessica Graham MD    Subjective     Chief Complaint   Patient presents with    Follow-up     6 month follow up     PROBLEM LIST:   Chest pain  8/21 exercise MPS: Exercise Cardiolite WNL.  LVEF >70%.   Expected = 6:55.  Actual = 8:45.   Low risk test.  Paroxysmal atrial fibrillation  Diagnosed 2019, followed by Dr. Kimbrough/Ezequiel  9/5/18 echo: EF 60% mild AI  9/18 Event monitor - short lived afib  7/24 48 hr holter 3% afib(longest >1 hour)  w 2 conv pauses.  Increased flec to 100 bid (offered RFA)  HL  9/10/18   HDL 40 LDL 88, on atorvastatin  6/21 135/379/29/67  Hypertension  GERD    History of Present Illness:   Mercedes Dale is a 64 y.o. female who presents to my electrophysiology clinic for follow up of atrial fibrillation and bradycardia. She says she was diagnosed with atrial fibrillation in 2019. At that time she had palpitations and fluttering. She has been treated with sotalol and more recently flecainide.  Patient was last seen in our office in October 2024 by Dr. Kimbrough.  She also follows with Dr. Ramirez for cardiac risk factor management.  At her last visit we discussed going up on flecainide.  She never did this because she came down with COVID-19 the week after seeing us.  Since recovering from COVID she has been doing well on flecainide milligrams twice daily.  Her Apple Watch shows about 3% atrial fibrillation.    Past Medical History:   Diagnosis Date    Anxiety 09/01/2016    Arthritis     Atrial fibrillation     Constipation 09/01/2016    Diverticulitis     Fibroid 2000    Glaucoma     Heart murmur     Hyperlipidemia     Hypertension 09/01/2016    Medication has been decreased    Insomnia 09/01/2016    Mitral valve prolapse     Other specified glaucoma     Pelvic  prolapse     Reflux esophagitis     Sinusitis        Past Surgical History:   Procedure Laterality Date     SECTION  88    D & C WITH SUCTION      GALLBLADDER SURGERY      GLAUCOMA SURGERY Bilateral     KNEE ARTHROSCOPY      scope twice    LAPAROSCOPIC ASSISTED VAGINAL HYSTERECTOMY SALPINGO OOPHORECTOMY  2012    Dr. Jang - benign pathology    LAPAROSCOPIC CHOLECYSTECTOMY  2012    TONSILLECTOMY         Family History   Problem Relation Age of Onset    Stroke Mother     Dementia Mother     Mitral valve prolapse Mother     Heart attack Mother     Cancer Mother     COPD Mother     Kidney disease Mother         Left kidney remved    Hypertension Mother     Heart attack Father     Heart failure Father     Early death Father         Heart attack    Hypertension Sister     Thyroid disease Sister     Anxiety disorder Sister     Depression Sister     Diabetes Sister     Diabetes Maternal Grandmother     Diabetes Paternal Grandmother     Breast cancer Cousin         paternal       Social History     Socioeconomic History    Marital status:     Number of children: 2    Highest education level: Some college, no degree   Tobacco Use    Smoking status: Never     Passive exposure: Never    Smokeless tobacco: Never   Vaping Use    Vaping status: Never Used   Substance and Sexual Activity    Alcohol use: No    Drug use: Never    Sexual activity: Yes     Partners: Male     Birth control/protection: Hysterectomy, Surgical         Current Outpatient Medications:     apixaban (Eliquis) 5 MG tablet tablet, take one tablet BY MOUTH EVERY 12 HOURS for circulation, Disp: 120 tablet, Rfl: 3    atorvastatin (LIPITOR) 10 MG tablet, take one tablet BY MOUTH EVERY DAY, Disp: 90 tablet, Rfl: 1    Calcium Carbonate-Vit D-Min (Caltrate 600+D Plus Minerals) 600-800 MG-UNIT tablet, Take 1 tablet by mouth Daily., Disp: 90 tablet, Rfl: 3    Cyanocobalamin 1000 MCG/ML kit, Inject 1 mL as directed Every 30  "(Thirty) Days., Disp: , Rfl:     dilTIAZem (TIAZAC) 120 MG 24 hr capsule, Take 1 capsule by mouth Daily., Disp: , Rfl:     flecainide (TAMBOCOR) 50 MG tablet, Take 1 tablet by mouth 2 (Two) Times a Day., Disp: , Rfl:     Multiple Vitamins-Minerals (THRIVE FOR LIFE WOMENS) tablet, Take 1 tablet by mouth Daily., Disp: , Rfl:     Probiotic Product (PROBIOTIC DAILY PO), Take 1 each by mouth Daily. SEED, Disp: , Rfl:     vitamin D (ERGOCALCIFEROL) 1.25 MG (90788 UT) capsule capsule, Take 1 capsule by mouth Every 7 (Seven) Days., Disp: 12 capsule, Rfl: 0    zolpidem (AMBIEN) 10 MG tablet, Take 1 tablet by mouth At Night As Needed for Sleep., Disp: 30 tablet, Rfl: 5    Allergies:   Allergies   Allergen Reactions    Codeine Nausea And Vomiting and Unknown (See Comments)    Hydrocodone Nausea And Vomiting    Morphine Nausea And Vomiting and Unknown (See Comments)    Adhesive Tape Itching    Azithromycin Other (See Comments)     Blisters in mouth and nose    Penicillins Other (See Comments)     Intolerance       Objective   Vital Signs: Blood pressure 124/74, pulse 56, height 167.6 cm (65.98\"), weight 81 kg (178 lb 9.6 oz), SpO2 99%, not currently breastfeeding.    PHYSICAL EXAM  General appearance: Awake, alert, cooperative  Head: Normocephalic, without obvious abnormality, atraumatic  Neck: No JVD  Lungs: Clear to ascultation bilaterally  Heart: Regular rate and rhythm, no murmurs, 2+ LE pulses, no lower extremity swelling  Skin: Skin color, turgor normal, no rashes or lesions  Neurologic: Grossly normal     Lab Results   Component Value Date    GLUCOSE 102 (H) 04/10/2025    CALCIUM 9.6 04/10/2025     04/10/2025    K 3.9 04/10/2025    CO2 23.6 04/10/2025     04/10/2025    BUN 18 04/10/2025    CREATININE 1.11 (H) 04/10/2025    EGFRIFNONA 46 (L) 08/06/2021    BCR 16.2 04/10/2025    ANIONGAP 11.4 04/10/2025     Lab Results   Component Value Date    WBC 8.33 03/18/2024    HGB 15.3 03/18/2024    HCT 44.5 03/18/2024 "    MCV 93.9 03/18/2024     03/18/2024     Lab Results   Component Value Date    INR 1.10 08/06/2021    PROTIME 14.7 (H) 08/06/2021     Lab Results   Component Value Date    TSH 3.950 04/10/2025          Results for orders placed during the hospital encounter of 09/05/18    Adult Transthoracic Echo Complete W/ Cont if Necessary Per Protocol    Interpretation Summary  · Left ventricular systolic function is normal. Estimated EF = 60%.  · Mild aortic valve regurgitation is present.         I personally viewed and interpreted the patient's EKG/Telemetry/lab data      ECG 12 Lead    Date/Time: 5/7/2025 3:34 PM  Performed by: Ezequiel Gonzalez PA-C    Authorized by: Ezequiel Gonzalez PA-C  Comparison: compared with previous ECG from 10/15/2024  Similar to previous ECG  Rhythm: sinus bradycardia  BPM: 56    Clinical impression: normal ECG          Mercedes Dale  reports that she has never smoked. She has never been exposed to tobacco smoke. She has never used smokeless tobacco.        Advance Care Planning   Advance Care Planning: ACP discussion was declined by the patient. Patient does not have an advance directive, information provided.     Assessment & Plan    1. Paroxysmal atrial fibrillation  Patient reports minimal symptoms and about a 3% A-fib burden on her smart watch.  She is happy with her A-fib management currently on flecainide 50 mg twice daily.  We discussed catheter ablation as an option again today in detail.  She will think about it but wants to stay with flecainide for now.    2. Long term current use of antiarrhythmic medical therapy  QRS narrow on EKG today.  Continue flecainide at current dosage.    3. Primary hypertension  The patient's blood pressure is controlled in the office today at 124/74.  Continue antihypertensive monotherapy diltiazem 120 mg daily.       Follow Up:  Return in about 1 year (around 5/6/2026).      Thank you for allowing me to participate in the care of your patient.  Please do not hesitate to contact me with additional questions or concerns.      Ezequiel Gonzalez PA-C  Cardiac Electrophysiology  [facility] / [facility]

## 2025-05-07 PROBLEM — Z79.899 LONG TERM CURRENT USE OF ANTIARRHYTHMIC MEDICAL THERAPY: Status: ACTIVE | Noted: 2025-05-07

## 2025-05-07 PROCEDURE — 93000 ELECTROCARDIOGRAM COMPLETE: CPT

## 2025-05-13 ENCOUNTER — OFFICE VISIT (OUTPATIENT)
Dept: PULMONOLOGY | Facility: CLINIC | Age: 65
End: 2025-05-13
Payer: COMMERCIAL

## 2025-05-13 VITALS
TEMPERATURE: 97.6 F | DIASTOLIC BLOOD PRESSURE: 76 MMHG | HEART RATE: 50 BPM | HEIGHT: 66 IN | SYSTOLIC BLOOD PRESSURE: 138 MMHG | WEIGHT: 177 LBS | OXYGEN SATURATION: 99 % | BODY MASS INDEX: 28.45 KG/M2

## 2025-05-13 DIAGNOSIS — G47.09 OTHER INSOMNIA: ICD-10-CM

## 2025-05-13 PROCEDURE — 99213 OFFICE O/P EST LOW 20 MIN: CPT | Performed by: NURSE PRACTITIONER

## 2025-05-13 RX ORDER — ZOLPIDEM TARTRATE 10 MG/1
10 TABLET ORAL NIGHTLY PRN
Qty: 30 TABLET | Refills: 5 | Status: SHIPPED | OUTPATIENT
Start: 2025-05-13

## 2025-05-13 NOTE — PROGRESS NOTES
"Chief Complaint  Other insomnia    Subjective          Mercedes Dale presents to [facility] for   History of Present Illness    Ms. Dale is a 64 year old female with a medical history significant for anxiety, insomnia, hypertension, arthritis, atrial fibrillation, glaucoma, hyperlipidemia, and GERD.    She presents today for follow up on insomnia.  She reports that she is still taking ambien nightly and is sleeping well with this.  She voices no complaints.      Objective   Vital Signs:   /76   Pulse 50   Temp 97.6 °F (36.4 °C)   Ht 167.6 cm (65.98\")   Wt 80.3 kg (177 lb)   SpO2 99%   BMI 28.58 kg/m²         Physical Exam    GENERAL APPEARANCE: Well developed, well nourished, alert and cooperative, and appears to be in no acute distress.    HEAD: normocephalic. Atraumatic.    EYES: PERRL, EOMI. Vision is grossly intact.    THROAT: Oral cavity and pharynx normal. No inflammation, swelling, exudate, or lesions.     NECK: Neck supple.  No thyromegaly.    CARDIAC: Normal S1 and S2. No S3, S4 or murmurs. Rhythm is regular.     RESPIRATORY:Bilateral air entry positive. No wheezing, crackles or rhonchi noted.    GI: Positive bowel sounds. Soft, nondistended, nontender.     MUSCULOSKELETAL: No significant deformity or joint abnormality. No edema. Peripheral pulses intact. No varicosities.    NEUROLOGICAL: Strength and sensation symmetric and intact throughout.     PSYCHIATRIC: The mental examination revealed the patient was oriented to person, place, and time.       Estimated body mass index is 28.58 kg/m² as calculated from the following:    Height as of this encounter: 167.6 cm (65.98\").    Weight as of this encounter: 80.3 kg (177 lb).        Result Review :   The following data was reviewed by: DARIO Junior on 05/13/2025:  Common labs          6/18/2024    12:01 4/10/2025    10:32   Common Labs   Glucose 103  102    BUN 14  18  " "  Creatinine 0.94  1.11    Sodium 140  140    Potassium 4.3  3.9    Chloride 105  105    Calcium 8.8  9.6    Albumin 4.4  4.3    Total Bilirubin 0.6  0.6    Alkaline Phosphatase 53  60    AST (SGOT) 29  31    ALT (SGPT) 38  33    Total Cholesterol  170    Triglycerides  236    HDL Cholesterol  36    LDL Cholesterol   94           PFT:NA    Low dose lung cancer screening:NA    Previous chest imaging:NA    Alpha-1 antitrypsin screening:NA    STOP-Bang Score:   NA  [place] Sleepiness Scale:   NA      ABG:    pH No results found for: \"PHART\"   pO2 No results found for: \"PO2ART\"   pCO2 No results found for: \"QST7MAH\"   HCO3 No results found for: \"ABY2RQW\"                      Assessment and Plan    Problem List Items Addressed This Visit          Sleep    Insomnia    Relevant Medications    zolpidem (AMBIEN) 10 MG tablet       Mercedes Dale  reports that she has never smoked. She has never been exposed to tobacco smoke. She has never used smokeless tobacco.     Continue Ambien 10 mg nightly.  Sent refills.    The problem of recurrent insomnia is discussed. Avoidance of caffeine sources is strongly encouraged. Sleep hygiene issues are reviewed.       Follow Up   Return in about 6 months (around 11/13/2025).  Patient was given instructions and counseling regarding her condition or for health maintenance advice. Please see specific information pulled into the AVS if appropriate.        "

## 2025-05-30 DIAGNOSIS — I48.0 PAROXYSMAL ATRIAL FIBRILLATION: ICD-10-CM

## 2025-05-30 RX ORDER — FLECAINIDE ACETATE 50 MG/1
50 TABLET ORAL EVERY 12 HOURS SCHEDULED
Qty: 180 TABLET | Refills: 0 | Status: SHIPPED | OUTPATIENT
Start: 2025-05-30

## 2025-06-02 ENCOUNTER — HOSPITAL ENCOUNTER (OUTPATIENT)
Dept: ULTRASOUND IMAGING | Facility: HOSPITAL | Age: 65
Discharge: HOME | End: 2025-06-02
Admitting: FAMILY MEDICINE
Payer: COMMERCIAL

## 2025-06-02 DIAGNOSIS — R60.0 LEG EDEMA, LEFT: ICD-10-CM

## 2025-06-02 DIAGNOSIS — R60.0 LEG EDEMA, LEFT: Primary | ICD-10-CM

## 2025-06-02 PROCEDURE — 93971 EXTREMITY STUDY: CPT | Performed by: RADIOLOGY

## 2025-06-02 PROCEDURE — 93971 EXTREMITY STUDY: CPT

## 2025-06-24 ENCOUNTER — OFFICE VISIT (OUTPATIENT)
Dept: FAMILY MEDICINE CLINIC | Facility: CLINIC | Age: 65
End: 2025-06-24
Payer: COMMERCIAL

## 2025-06-24 VITALS
BODY MASS INDEX: 28.9 KG/M2 | TEMPERATURE: 97.8 F | HEIGHT: 66 IN | WEIGHT: 179.8 LBS | DIASTOLIC BLOOD PRESSURE: 80 MMHG | OXYGEN SATURATION: 99 % | SYSTOLIC BLOOD PRESSURE: 134 MMHG | HEART RATE: 64 BPM

## 2025-06-24 DIAGNOSIS — R09.81 NASAL CONGESTION: Primary | ICD-10-CM

## 2025-06-24 PROCEDURE — 87428 SARSCOV & INF VIR A&B AG IA: CPT | Performed by: FAMILY MEDICINE

## 2025-06-24 PROCEDURE — 99213 OFFICE O/P EST LOW 20 MIN: CPT | Performed by: FAMILY MEDICINE

## 2025-06-24 NOTE — PROGRESS NOTES
Subjective   Mercedes Dale is a 64 y.o. female.   Pt presents today with CC of Nasal Congestion      History of Present Illness   History of Present Illness  Patient is a pleasant 64-year-old female here complaining of nasal congestion, cough, and malaise for the past couple of days she would like to be evaluated for viral illnesses.       The following portions of the patient's history were reviewed and updated as appropriate: allergies, current medications, past family history, past medical history, past social history, past surgical history, and problem list.    Review of Systems   Constitutional:  Negative for chills, fever and unexpected weight loss.   HENT:  Positive for congestion, postnasal drip, rhinorrhea, sinus pressure and sore throat.    Eyes:  Negative for blurred vision and visual disturbance.   Respiratory:  Positive for cough. Negative for wheezing.    Cardiovascular:  Negative for chest pain and palpitations.   Gastrointestinal:  Negative for abdominal pain, diarrhea and nausea.   Genitourinary:  Negative for dysuria.   Musculoskeletal:  Negative for arthralgias and neck stiffness.   Skin:  Negative for rash.   Neurological:  Negative for seizures and syncope.     Vitals:    06/24/25 1113   BP: 134/80   Pulse: 64   Temp: 97.8 °F (36.6 °C)   SpO2: 99%        Objective   Physical Exam  Vitals and nursing note reviewed.   Constitutional:       Appearance: She is well-developed.   HENT:      Head: Normocephalic and atraumatic.      Right Ear: External ear normal.      Left Ear: External ear normal.      Nose: Nose normal.   Eyes:      Conjunctiva/sclera: Conjunctivae normal.      Pupils: Pupils are equal, round, and reactive to light.   Cardiovascular:      Rate and Rhythm: Normal rate and regular rhythm.      Heart sounds: Normal heart sounds.   Pulmonary:      Effort: Pulmonary effort is normal.      Breath sounds: Normal breath sounds.   Abdominal:      General: Bowel sounds are normal.       Palpations: Abdomen is soft.   Musculoskeletal:      Cervical back: Normal range of motion and neck supple.   Skin:     General: Skin is warm and dry.   Neurological:      Mental Status: She is alert and oriented to person, place, and time.   Psychiatric:         Behavior: Behavior normal.           Assessment & Plan   Diagnoses and all orders for this visit:    1. Nasal congestion (Primary)  -     POCT SARS-CoV-2 Antigen SONA + Flu      Negative for COVID and flu.  Illness appears to be relatively mild.  I recommend supportive care.                  This document has been electronically signed by Valeria Duran  June 24, 2025 11:15 EDT    Dictated Utilizing Dragon Dictation: Part of this note may be an electronic transcription/translation of spoken language to printed text using the Dragon Dictation System.

## 2025-07-10 ENCOUNTER — OFFICE VISIT (OUTPATIENT)
Dept: FAMILY MEDICINE CLINIC | Facility: CLINIC | Age: 65
End: 2025-07-10
Payer: COMMERCIAL

## 2025-07-10 ENCOUNTER — LAB (OUTPATIENT)
Dept: FAMILY MEDICINE CLINIC | Facility: CLINIC | Age: 65
End: 2025-07-10
Payer: COMMERCIAL

## 2025-07-10 VITALS
DIASTOLIC BLOOD PRESSURE: 66 MMHG | SYSTOLIC BLOOD PRESSURE: 122 MMHG | WEIGHT: 177.8 LBS | BODY MASS INDEX: 28.57 KG/M2 | HEIGHT: 66 IN | RESPIRATION RATE: 16 BRPM | OXYGEN SATURATION: 98 % | HEART RATE: 62 BPM | TEMPERATURE: 96.6 F

## 2025-07-10 DIAGNOSIS — J20.9 ACUTE BRONCHITIS, UNSPECIFIED ORGANISM: ICD-10-CM

## 2025-07-10 DIAGNOSIS — M25.50 ARTHRALGIA, UNSPECIFIED JOINT: ICD-10-CM

## 2025-07-10 DIAGNOSIS — E53.8 B12 DEFICIENCY: ICD-10-CM

## 2025-07-10 DIAGNOSIS — R09.81 NASAL CONGESTION: Primary | ICD-10-CM

## 2025-07-10 DIAGNOSIS — E55.9 VITAMIN D DEFICIENCY: ICD-10-CM

## 2025-07-10 DIAGNOSIS — R05.1 ACUTE COUGH: ICD-10-CM

## 2025-07-10 DIAGNOSIS — R09.81 NASAL CONGESTION: ICD-10-CM

## 2025-07-10 LAB
ALBUMIN SERPL-MCNC: 4.3 G/DL (ref 3.5–5.2)
ALBUMIN/GLOB SERPL: 1.2 G/DL
ALP SERPL-CCNC: 59 U/L (ref 39–117)
ALT SERPL W P-5'-P-CCNC: 28 U/L (ref 1–33)
ANION GAP SERPL CALCULATED.3IONS-SCNC: 15 MMOL/L (ref 5–15)
AST SERPL-CCNC: 28 U/L (ref 1–32)
BASOPHILS # BLD AUTO: 0.04 10*3/MM3 (ref 0–0.2)
BASOPHILS NFR BLD AUTO: 0.5 % (ref 0–1.5)
BILIRUB SERPL-MCNC: 0.5 MG/DL (ref 0–1.2)
BUN SERPL-MCNC: 17 MG/DL (ref 8–23)
BUN/CREAT SERPL: 16.5 (ref 7–25)
CALCIUM SPEC-SCNC: 9.4 MG/DL (ref 8.6–10.5)
CHLORIDE SERPL-SCNC: 102 MMOL/L (ref 98–107)
CO2 SERPL-SCNC: 23 MMOL/L (ref 22–29)
CREAT SERPL-MCNC: 1.03 MG/DL (ref 0.57–1)
CRP SERPL-MCNC: 0.32 MG/DL (ref 0–0.5)
DEPRECATED RDW RBC AUTO: 45.4 FL (ref 37–54)
EGFRCR SERPLBLD CKD-EPI 2021: 60.8 ML/MIN/1.73
EOSINOPHIL # BLD AUTO: 0.14 10*3/MM3 (ref 0–0.4)
EOSINOPHIL NFR BLD AUTO: 1.7 % (ref 0.3–6.2)
ERYTHROCYTE [DISTWIDTH] IN BLOOD BY AUTOMATED COUNT: 12.7 % (ref 12.3–15.4)
ERYTHROCYTE [SEDIMENTATION RATE] IN BLOOD: 24 MM/HR (ref 0–30)
EXPIRATION DATE: NORMAL
EXPIRATION DATE: NORMAL
FLUAV AG UPPER RESP QL IA.RAPID: NOT DETECTED
FLUBV AG UPPER RESP QL IA.RAPID: NOT DETECTED
GLOBULIN UR ELPH-MCNC: 3.7 GM/DL
GLUCOSE SERPL-MCNC: 97 MG/DL (ref 65–99)
HCT VFR BLD AUTO: 40.6 % (ref 34–46.6)
HGB BLD-MCNC: 13.4 G/DL (ref 12–15.9)
IMM GRANULOCYTES # BLD AUTO: 0.02 10*3/MM3 (ref 0–0.05)
IMM GRANULOCYTES NFR BLD AUTO: 0.2 % (ref 0–0.5)
INTERNAL CONTROL: NORMAL
INTERNAL CONTROL: NORMAL
LYMPHOCYTES # BLD AUTO: 2.55 10*3/MM3 (ref 0.7–3.1)
LYMPHOCYTES NFR BLD AUTO: 31.4 % (ref 19.6–45.3)
Lab: NORMAL
Lab: NORMAL
MCH RBC QN AUTO: 32.5 PG (ref 26.6–33)
MCHC RBC AUTO-ENTMCNC: 33 G/DL (ref 31.5–35.7)
MCV RBC AUTO: 98.5 FL (ref 79–97)
MONOCYTES # BLD AUTO: 0.55 10*3/MM3 (ref 0.1–0.9)
MONOCYTES NFR BLD AUTO: 6.8 % (ref 5–12)
NEUTROPHILS NFR BLD AUTO: 4.83 10*3/MM3 (ref 1.7–7)
NEUTROPHILS NFR BLD AUTO: 59.4 % (ref 42.7–76)
NRBC BLD AUTO-RTO: 0 /100 WBC (ref 0–0.2)
PLATELET # BLD AUTO: 229 10*3/MM3 (ref 140–450)
PMV BLD AUTO: 12.6 FL (ref 6–12)
POTASSIUM SERPL-SCNC: 3.9 MMOL/L (ref 3.5–5.2)
PROT SERPL-MCNC: 8 G/DL (ref 6–8.5)
RBC # BLD AUTO: 4.12 10*6/MM3 (ref 3.77–5.28)
S PYO AG THROAT QL: NEGATIVE
SARS-COV-2 AG UPPER RESP QL IA.RAPID: NOT DETECTED
SODIUM SERPL-SCNC: 140 MMOL/L (ref 136–145)
URATE SERPL-MCNC: 6.4 MG/DL (ref 2.4–5.7)
WBC NRBC COR # BLD AUTO: 8.13 10*3/MM3 (ref 3.4–10.8)

## 2025-07-10 PROCEDURE — 80053 COMPREHEN METABOLIC PANEL: CPT | Performed by: FAMILY MEDICINE

## 2025-07-10 PROCEDURE — 85025 COMPLETE CBC W/AUTO DIFF WBC: CPT | Performed by: FAMILY MEDICINE

## 2025-07-10 PROCEDURE — 36415 COLL VENOUS BLD VENIPUNCTURE: CPT

## 2025-07-10 PROCEDURE — 84550 ASSAY OF BLOOD/URIC ACID: CPT | Performed by: FAMILY MEDICINE

## 2025-07-10 PROCEDURE — 85652 RBC SED RATE AUTOMATED: CPT | Performed by: FAMILY MEDICINE

## 2025-07-10 PROCEDURE — 86140 C-REACTIVE PROTEIN: CPT | Performed by: FAMILY MEDICINE

## 2025-07-10 RX ORDER — CYANOCOBALAMIN 1000 UG/ML
1000 INJECTION, SOLUTION INTRAMUSCULAR; SUBCUTANEOUS ONCE
Status: COMPLETED | OUTPATIENT
Start: 2025-07-10 | End: 2025-07-10

## 2025-07-10 RX ORDER — ERGOCALCIFEROL 1.25 MG/1
50000 CAPSULE, LIQUID FILLED ORAL
Qty: 12 CAPSULE | Refills: 0 | Status: CANCELLED | OUTPATIENT
Start: 2025-07-10

## 2025-07-10 RX ORDER — DOXYCYCLINE 100 MG/1
100 CAPSULE ORAL 2 TIMES DAILY
Qty: 14 CAPSULE | Refills: 0 | Status: SHIPPED | OUTPATIENT
Start: 2025-07-10

## 2025-07-10 RX ORDER — GUAIFENESIN 600 MG/1
1200 TABLET, EXTENDED RELEASE ORAL 2 TIMES DAILY
Qty: 120 TABLET | Refills: 0 | Status: SHIPPED | OUTPATIENT
Start: 2025-07-10

## 2025-07-10 RX ADMIN — CYANOCOBALAMIN 1000 MCG: 1000 INJECTION, SOLUTION INTRAMUSCULAR; SUBCUTANEOUS at 10:35

## 2025-07-17 NOTE — PROGRESS NOTES
"Mercedes Dale     VITALS: Blood pressure 122/66, pulse 62, temperature 96.6 °F (35.9 °C), temperature source Temporal, resp. rate 16, height 167.6 cm (66\"), weight 80.6 kg (177 lb 12.8 oz), SpO2 98%, not currently breastfeeding.    Subjective  Chief Complaint  Nasal Congestion, Cough, Atrial Fibrillation, and Torticollis    Subjective          History of Present Illness:    History of Present Illness  The patient is a 64-year-old female with medical conditions significant for hyperlipidemia, atrial fibrillation, and insomnia who presents to the clinic for a medical follow-up.    She has been experiencing persistent sneezing and head congestion for the past 3 weeks, accompanied by yellow nasal discharge. Symptoms improve in the evenings, but she has developed a severe sore throat. She also reports a burning sensation and a large fever blister, which she treated with an antibiotic, resulting in a scab. Upon lifting the scab, she noticed green pus, which she managed to remove in the shower. She has been using Sudafed for relief and managing her symptoms with Tylenol.    She suspects she may have arthritis as she has been experiencing swelling in her fingers, which has since subsided. She had to remove her rings due to the swelling and now feels as though her fingers are bruised. She has been using a squeeze ball for joint movement and finds it helpful. She is unsure if she has gout. She recently returned from a cruise where she consumed a Mediterranean diet, including chicken, even though she is not typically a meat eater.    She has been feeling fatigued and believes this may be due to a lack of B12 injections. She is requesting a B12 injection today.    She had a colonoscopy in 2017 and is due for another one in 2 years.    No complaints regarding medications.     The following portions of the patient's history were reviewed and updated as appropriate: allergies, current medications, past family history, past " medical history, past social history, past surgical history and problem list.    Past Medical History  Past Medical History:   Diagnosis Date    Anxiety 2016    Arthritis     Atrial fibrillation     Constipation 2016    Diverticulitis     Fibroid 2000    Glaucoma     Heart murmur     Hyperlipidemia     Hypertension 2016    Medication has been decreased    Insomnia 2016    Mitral valve prolapse     Other specified glaucoma     Pelvic prolapse     Reflux esophagitis     Sinusitis        Surgical History  Past Surgical History:   Procedure Laterality Date     SECTION  88    D & C WITH SUCTION      GALLBLADDER SURGERY      GLAUCOMA SURGERY Bilateral     KNEE ARTHROSCOPY      scope twice    LAPAROSCOPIC ASSISTED VAGINAL HYSTERECTOMY SALPINGO OOPHORECTOMY  2012    Dr. Jang - benign pathology    LAPAROSCOPIC CHOLECYSTECTOMY  2012    TONSILLECTOMY         Family History  Family History   Problem Relation Age of Onset    Stroke Mother     Dementia Mother     Mitral valve prolapse Mother     Heart attack Mother     Cancer Mother     COPD Mother     Kidney disease Mother         Left kidney remved    Hypertension Mother     Heart attack Father     Heart failure Father     Early death Father         Heart attack    Hypertension Sister     Thyroid disease Sister     Anxiety disorder Sister     Depression Sister     Diabetes Sister     Diabetes Maternal Grandmother     Diabetes Paternal Grandmother     Breast cancer Cousin         paternal       Social History  Social History     Socioeconomic History    Marital status:     Number of children: 2    Highest education level: Some college, no degree   Tobacco Use    Smoking status: Never     Passive exposure: Never    Smokeless tobacco: Never   Vaping Use    Vaping status: Never Used   Substance and Sexual Activity    Alcohol use: No    Drug use: Never    Sexual activity: Yes     Partners: Male     Birth  "control/protection: Hysterectomy, Surgical       Objective   Vital Signs:   /66 (BP Location: Right arm, Patient Position: Sitting, Cuff Size: Adult)   Pulse 62   Temp 96.6 °F (35.9 °C) (Temporal)   Resp 16   Ht 167.6 cm (66\")   Wt 80.6 kg (177 lb 12.8 oz)   SpO2 98%   BMI 28.70 kg/m²       Physical Exam     Physical Exam  Mouth/Throat: Erythema and significant inflammation noted in the throat.  Respiratory: Bronchial breath sounds noted, suggestive of bronchitis.    Gen: Patient in NAD. Pleasant and answers appropriately. A&Ox3.    Skin: Warm and dry with normal turgor. No purpura, rashes, or unusual pigmentation noted. Hair is normal in appearance and distribution.    HEENT: NC/AT. No lesions noted. Conjunctiva clear, sclera nonicteric. PERRL. EOMI without nystagmus or strabismus. Fundi appear benign. No hemorrhages or exudates of eyes. Auditory canals are patent bilaterally without lesions. TMs intact,  nonerythematous, bulging without lesions. Nasal mucosa erythematous, and nonedematous. Frontal and maxillary sinuses are nontender. O/P erythematous and moist without exudate.    Neck: Supple without lymph nodes palpated. FROM. No carotid bruits appreciated bilaterally.    Lungs: Coarse B/L without rales, rhonchi, crackles, or wheezes.    Heart: Irregularly irregular. S1 and S2 normal. No S3 or S4. No MRGT.    Abd: Soft, nontender,nondistended. (+)BSx4 quadrants.     Extrem: No CCE. Radial pulses 2+/4 and equal B/L. FROMx4.  Positive joint tenderness noted.    Neuro: No focal motor/sensory deficits.    Procedures    Result Review :   The following data was reviewed by: Jessica Graham MD on 07/10/2025:       Results  Labs     Lab Results   Component Value Date    GLUCOSE 97 07/10/2025    BUN 17.0 07/10/2025    CREATININE 1.03 (H) 07/10/2025     07/10/2025    K 3.9 07/10/2025     07/10/2025    CALCIUM 9.4 07/10/2025    PROTEINTOT 8.0 07/10/2025    ALBUMIN 4.3 07/10/2025    ALT 28 " 07/10/2025    AST 28 07/10/2025    ALKPHOS 59 07/10/2025    BILITOT 0.5 07/10/2025    GLOB 3.7 07/10/2025    AGRATIO 1.2 07/10/2025    BCR 16.5 07/10/2025    ANIONGAP 15.0 07/10/2025    EGFR 60.8 07/10/2025     April 10, 2025 vitamin D: 48.7           Assessment and Plan      Mercedes Dale is a 64 y.o. here for medical followup.    Diagnoses and all orders for this visit:    1. Nasal congestion (Primary)  -     POCT SARS-CoV-2 Antigen SONA + Flu  -     POCT rapid strep A  -     CBC Auto Differential; Future  -     Comprehensive Metabolic Panel; Future    2. Vitamin D deficiency  -     CBC Auto Differential; Future  -     Comprehensive Metabolic Panel; Future    3. Acute cough  -     POCT SARS-CoV-2 Antigen SONA + Flu  -     POCT rapid strep A  -     CBC Auto Differential; Future  -     Comprehensive Metabolic Panel; Future    4. B12 deficiency  -     Cyanocobalamin 1000 MCG/ML kit; Inject 1 mL as directed Every 30 (Thirty) Days.  Dispense: 3 kit; Refill: 1  -     CBC Auto Differential; Future  -     Comprehensive Metabolic Panel; Future  -     cyanocobalamin injection 1,000 mcg    5. Arthralgia, unspecified joint  -     CBC Auto Differential; Future  -     Comprehensive Metabolic Panel; Future  -     Uric Acid; Future  -     Sedimentation Rate; Future  -     C-reactive Protein; Future    6. Acute bronchitis, unspecified organism  -     CBC Auto Differential; Future  -     Comprehensive Metabolic Panel; Future  -     guaiFENesin (Mucinex) 600 MG 12 hr tablet; Take 2 tablets by mouth 2 (Two) Times a Day.  Dispense: 120 tablet; Refill: 0  -     doxycycline (VIBRAMYCIN) 100 MG capsule; Take 1 capsule by mouth 2 (Two) Times a Day.  Dispense: 14 capsule; Refill: 0        Assessment & Plan  1. Bronchitis.  - Symptoms include persistent sneezing, head congestion, and yellow nasal discharge for the past 3 weeks.  - Examination revealed nasal congestion and fluid in the lungs.  - Doxycycline has been prescribed. Mucinex will  be added to her current regimen of Sudafed.  - If there is no improvement by the end of next week, she should call the office.    2. Arthritis.  - Reports swelling and tightness in her fingers, which has improved but persists in one joint.  - Differential diagnosis includes gout due to a recent increase in uric acid levels.  - Advised to continue using a squeeze ball for joint movement.  - If symptoms worsen, she should start over-the-counter vitamin D 1000 IU daily.    3. Health Maintenance.  - Vitamin D levels are excellent.  - Will discontinue weekly vitamin D supplements and switch to over-the-counter daily vitamin D 1000 IU.  - Labs will be drawn to check inflammatory levels and CBC.    4. B12 deficiency.  - A B12 injection will be administered today.         Patient or patient representative verbalized consent for the use of Ambient Listening during the visit with  Jessica Graham MD for chart documentation. 7/17/2025  02:19 EDT        Follow Up   Return in about 3 months (around 10/10/2025), or will come back for labs.  Findings and plans discussed with patient who verbalizes understanding and agreement. Will followup with patient once results are in. Patient was given instructions and counseling regarding her condition or for health maintenance advice. Please see specific information pulled into the AVS if appropriate.       Jessica Graham MD

## 2025-07-25 ENCOUNTER — TELEPHONE (OUTPATIENT)
Dept: FAMILY MEDICINE CLINIC | Facility: CLINIC | Age: 65
End: 2025-07-25
Payer: COMMERCIAL

## 2025-07-25 NOTE — TELEPHONE ENCOUNTER
[employer] Use B-12 kit was submitted but denied stating that it was not covered by the plan. I contacted the pharmacy and they stated that they were able to switch it to the generic and the insurance covered the medication.